# Patient Record
Sex: MALE | Race: WHITE | NOT HISPANIC OR LATINO | Employment: FULL TIME | URBAN - METROPOLITAN AREA
[De-identification: names, ages, dates, MRNs, and addresses within clinical notes are randomized per-mention and may not be internally consistent; named-entity substitution may affect disease eponyms.]

---

## 2018-02-07 ENCOUNTER — OFFICE VISIT CONVERTED (OUTPATIENT)
Dept: INTERNAL MEDICINE | Facility: CLINIC | Age: 28
End: 2018-02-07
Attending: INTERNAL MEDICINE

## 2018-06-04 ENCOUNTER — OFFICE VISIT CONVERTED (OUTPATIENT)
Dept: INTERNAL MEDICINE | Facility: CLINIC | Age: 28
End: 2018-06-04
Attending: PHYSICIAN ASSISTANT

## 2018-06-04 ENCOUNTER — CONVERSION ENCOUNTER (OUTPATIENT)
Dept: INTERNAL MEDICINE | Facility: CLINIC | Age: 28
End: 2018-06-04

## 2019-02-07 ENCOUNTER — HOSPITAL ENCOUNTER (OUTPATIENT)
Dept: OTHER | Facility: HOSPITAL | Age: 29
Discharge: HOME | End: 2019-02-07
Attending: INTERNAL MEDICINE

## 2019-02-07 ENCOUNTER — OFFICE VISIT CONVERTED (OUTPATIENT)
Dept: INTERNAL MEDICINE | Facility: CLINIC | Age: 29
End: 2019-02-07
Attending: INTERNAL MEDICINE

## 2019-02-07 LAB
ALBUMIN SERPL-MCNC: 4.3 G/DL (ref 3.5–5)
ALBUMIN/GLOB SERPL: 1.2 {RATIO} (ref 1.4–2.6)
ALP SERPL-CCNC: 77 U/L (ref 53–128)
ALT SERPL-CCNC: 25 U/L (ref 10–40)
ANION GAP SERPL CALC-SCNC: 18 MMOL/L (ref 8–19)
AST SERPL-CCNC: 25 U/L (ref 15–50)
BASOPHILS # BLD AUTO: 0.03 10*3/UL (ref 0–0.2)
BASOPHILS NFR BLD AUTO: 0.42 % (ref 0–3)
BILIRUB SERPL-MCNC: 0.31 MG/DL (ref 0.2–1.3)
BUN SERPL-MCNC: 24 MG/DL (ref 5–25)
BUN/CREAT SERPL: 23 {RATIO} (ref 6–20)
CALCIUM SERPL-MCNC: 9.5 MG/DL (ref 8.7–10.4)
CHLORIDE SERPL-SCNC: 102 MMOL/L (ref 99–111)
CHOLEST SERPL-MCNC: 116 MG/DL (ref 107–200)
CHOLEST/HDLC SERPL: 3 {RATIO} (ref 3–6)
CONV CO2: 23 MMOL/L (ref 22–32)
CONV TOTAL PROTEIN: 7.8 G/DL (ref 6.3–8.2)
CREAT UR-MCNC: 1.04 MG/DL (ref 0.7–1.2)
EOSINOPHIL # BLD AUTO: 0.2 10*3/UL (ref 0–0.7)
EOSINOPHIL # BLD AUTO: 3.27 % (ref 0–7)
ERYTHROCYTE [DISTWIDTH] IN BLOOD BY AUTOMATED COUNT: 11 % (ref 11.5–14.5)
GFR SERPLBLD BASED ON 1.73 SQ M-ARVRAT: >60 ML/MIN/{1.73_M2}
GLOBULIN UR ELPH-MCNC: 3.5 G/DL (ref 2–3.5)
GLUCOSE SERPL-MCNC: 80 MG/DL (ref 70–99)
HBA1C MFR BLD: 15.4 G/DL (ref 14–18)
HCT VFR BLD AUTO: 42.7 % (ref 42–52)
HDLC SERPL-MCNC: 39 MG/DL (ref 40–60)
LDLC SERPL CALC-MCNC: 67 MG/DL (ref 70–100)
LYMPHOCYTES # BLD AUTO: 1.83 10*3/UL (ref 1–5)
MCH RBC QN AUTO: 33.7 PG (ref 27–31)
MCHC RBC AUTO-ENTMCNC: 36 G/DL (ref 33–37)
MCV RBC AUTO: 93.6 FL (ref 80–96)
MONOCYTES # BLD AUTO: 0.47 10*3/UL (ref 0.2–1.2)
MONOCYTES NFR BLD AUTO: 7.63 % (ref 3–10)
NEUTROPHILS # BLD AUTO: 3.63 10*3/UL (ref 2–8)
NEUTROPHILS NFR BLD AUTO: 58.9 % (ref 30–85)
NRBC BLD AUTO-RTO: 0 % (ref 0–0.01)
OSMOLALITY SERPL CALC.SUM OF ELEC: 291 MOSM/KG (ref 273–304)
PLATELET # BLD AUTO: 298 10*3/UL (ref 130–400)
PMV BLD AUTO: 7.5 FL (ref 7.4–10.4)
POTASSIUM SERPL-SCNC: 4.4 MMOL/L (ref 3.5–5.3)
RBC # BLD AUTO: 4.57 10*6/UL (ref 4.7–6.1)
SODIUM SERPL-SCNC: 139 MMOL/L (ref 135–147)
TRIGL SERPL-MCNC: 50 MG/DL (ref 40–150)
TSH SERPL-ACNC: 1.58 M[IU]/L (ref 0.27–4.2)
VARIANT LYMPHS NFR BLD MANUAL: 29.8 % (ref 20–45)
VLDLC SERPL-MCNC: 10 MG/DL (ref 5–37)
WBC # BLD AUTO: 6.16 10*3/UL (ref 4.8–10.8)

## 2019-04-29 ENCOUNTER — HOSPITAL ENCOUNTER (OUTPATIENT)
Dept: OTHER | Facility: HOSPITAL | Age: 29
Discharge: HOME | End: 2019-04-29
Attending: INTERNAL MEDICINE

## 2019-04-29 LAB
APPEARANCE UR: CLEAR
BILIRUB UR QL: NEGATIVE
COLOR UR: YELLOW
CONV COLLECTION SOURCE (UA): NORMAL
CONV UROBILINOGEN IN URINE BY AUTOMATED TEST STRIP: 0.2 {EHRLICHU}/DL (ref 0.1–1)
GLUCOSE UR QL: NEGATIVE MG/DL
HGB UR QL STRIP: NEGATIVE
KETONES UR QL STRIP: NEGATIVE MG/DL
LEUKOCYTE ESTERASE UR QL STRIP: NEGATIVE
NITRITE UR QL STRIP: NEGATIVE
PH UR STRIP.AUTO: 5 [PH] (ref 5–8)
PROT UR QL: NEGATIVE MG/DL
SP GR UR: 1.02 (ref 1–1.03)

## 2019-05-01 LAB — BACTERIA UR CULT: NORMAL

## 2019-06-27 ENCOUNTER — CONVERSION ENCOUNTER (OUTPATIENT)
Dept: INTERNAL MEDICINE | Facility: CLINIC | Age: 29
End: 2019-06-27

## 2019-06-27 ENCOUNTER — OFFICE VISIT CONVERTED (OUTPATIENT)
Dept: INTERNAL MEDICINE | Facility: CLINIC | Age: 29
End: 2019-06-27
Attending: INTERNAL MEDICINE

## 2020-02-07 ENCOUNTER — OFFICE VISIT CONVERTED (OUTPATIENT)
Dept: INTERNAL MEDICINE | Facility: CLINIC | Age: 30
End: 2020-02-07
Attending: INTERNAL MEDICINE

## 2020-02-07 ENCOUNTER — HOSPITAL ENCOUNTER (OUTPATIENT)
Dept: OTHER | Facility: HOSPITAL | Age: 30
Discharge: HOME | End: 2020-02-07
Attending: INTERNAL MEDICINE

## 2020-02-07 LAB
ALBUMIN SERPL-MCNC: 4.5 G/DL (ref 3.5–5)
ALBUMIN/GLOB SERPL: 1.4 {RATIO} (ref 1.4–2.6)
ALP SERPL-CCNC: 73 U/L (ref 53–128)
ALT SERPL-CCNC: 15 U/L (ref 10–40)
ANION GAP SERPL CALC-SCNC: 21 MMOL/L (ref 8–19)
AST SERPL-CCNC: 21 U/L (ref 15–50)
BASOPHILS # BLD AUTO: 0.04 10*3/UL (ref 0–0.2)
BASOPHILS NFR BLD AUTO: 0.6 % (ref 0–3)
BILIRUB SERPL-MCNC: 0.5 MG/DL (ref 0.2–1.3)
BUN SERPL-MCNC: 17 MG/DL (ref 5–25)
BUN/CREAT SERPL: 17 {RATIO} (ref 6–20)
CALCIUM SERPL-MCNC: 9.5 MG/DL (ref 8.7–10.4)
CHLORIDE SERPL-SCNC: 99 MMOL/L (ref 99–111)
CHOLEST SERPL-MCNC: 128 MG/DL (ref 107–200)
CHOLEST/HDLC SERPL: 3.3 {RATIO} (ref 3–6)
CONV ABS IMM GRAN: 0.01 10*3/UL (ref 0–0.2)
CONV CO2: 24 MMOL/L (ref 22–32)
CONV IMMATURE GRAN: 0.2 % (ref 0–1.8)
CONV TOTAL PROTEIN: 7.8 G/DL (ref 6.3–8.2)
CREAT UR-MCNC: 0.99 MG/DL (ref 0.7–1.2)
DEPRECATED RDW RBC AUTO: 40.2 FL (ref 35.1–43.9)
EOSINOPHIL # BLD AUTO: 0.22 10*3/UL (ref 0–0.7)
EOSINOPHIL # BLD AUTO: 3.5 % (ref 0–7)
ERYTHROCYTE [DISTWIDTH] IN BLOOD BY AUTOMATED COUNT: 11.7 % (ref 11.6–14.4)
GFR SERPLBLD BASED ON 1.73 SQ M-ARVRAT: >60 ML/MIN/{1.73_M2}
GLOBULIN UR ELPH-MCNC: 3.3 G/DL (ref 2–3.5)
GLUCOSE SERPL-MCNC: 80 MG/DL (ref 70–99)
HCT VFR BLD AUTO: 43.6 % (ref 42–52)
HDLC SERPL-MCNC: 39 MG/DL (ref 40–60)
HGB BLD-MCNC: 15.1 G/DL (ref 14–18)
LDLC SERPL CALC-MCNC: 79 MG/DL (ref 70–100)
LYMPHOCYTES # BLD AUTO: 2.02 10*3/UL (ref 1–5)
LYMPHOCYTES NFR BLD AUTO: 32.5 % (ref 20–45)
MCH RBC QN AUTO: 32.9 PG (ref 27–31)
MCHC RBC AUTO-ENTMCNC: 34.6 G/DL (ref 33–37)
MCV RBC AUTO: 95 FL (ref 80–96)
MONOCYTES # BLD AUTO: 0.46 10*3/UL (ref 0.2–1.2)
MONOCYTES NFR BLD AUTO: 7.4 % (ref 3–10)
NEUTROPHILS # BLD AUTO: 3.46 10*3/UL (ref 2–8)
NEUTROPHILS NFR BLD AUTO: 55.8 % (ref 30–85)
NRBC CBCN: 0 % (ref 0–0.7)
OSMOLALITY SERPL CALC.SUM OF ELEC: 289 MOSM/KG (ref 273–304)
PLATELET # BLD AUTO: 283 10*3/UL (ref 130–400)
PMV BLD AUTO: 10.2 FL (ref 9.4–12.4)
POTASSIUM SERPL-SCNC: 4.5 MMOL/L (ref 3.5–5.3)
RBC # BLD AUTO: 4.59 10*6/UL (ref 4.7–6.1)
SODIUM SERPL-SCNC: 139 MMOL/L (ref 135–147)
TRIGL SERPL-MCNC: 48 MG/DL (ref 40–150)
VLDLC SERPL-MCNC: 10 MG/DL (ref 5–37)
WBC # BLD AUTO: 6.21 10*3/UL (ref 4.8–10.8)

## 2020-05-08 ENCOUNTER — TELEMEDICINE CONVERTED (OUTPATIENT)
Dept: INTERNAL MEDICINE | Facility: CLINIC | Age: 30
End: 2020-05-08
Attending: INTERNAL MEDICINE

## 2020-05-21 ENCOUNTER — OFFICE VISIT CONVERTED (OUTPATIENT)
Dept: UROLOGY | Facility: CLINIC | Age: 30
End: 2020-05-21
Attending: UROLOGY

## 2020-06-26 ENCOUNTER — PROCEDURE VISIT CONVERTED (OUTPATIENT)
Dept: UROLOGY | Facility: CLINIC | Age: 30
End: 2020-06-26
Attending: UROLOGY

## 2020-07-02 ENCOUNTER — OFFICE VISIT CONVERTED (OUTPATIENT)
Dept: UROLOGY | Facility: CLINIC | Age: 30
End: 2020-07-02
Attending: UROLOGY

## 2020-09-03 ENCOUNTER — OFFICE VISIT CONVERTED (OUTPATIENT)
Dept: UROLOGY | Facility: CLINIC | Age: 30
End: 2020-09-03
Attending: UROLOGY

## 2020-10-02 ENCOUNTER — OFFICE VISIT CONVERTED (OUTPATIENT)
Dept: INTERNAL MEDICINE | Facility: CLINIC | Age: 30
End: 2020-10-02
Attending: NURSE PRACTITIONER

## 2020-10-02 ENCOUNTER — HOSPITAL ENCOUNTER (OUTPATIENT)
Dept: OTHER | Facility: HOSPITAL | Age: 30
Discharge: HOME | End: 2020-10-02
Attending: NURSE PRACTITIONER

## 2020-10-02 LAB
ALBUMIN SERPL-MCNC: 4.5 G/DL (ref 3.5–5)
ALBUMIN/GLOB SERPL: 1.5 {RATIO} (ref 1.4–2.6)
ALP SERPL-CCNC: 75 U/L (ref 53–128)
ALT SERPL-CCNC: 43 U/L (ref 10–40)
ANION GAP SERPL CALC-SCNC: 15 MMOL/L (ref 8–19)
AST SERPL-CCNC: 38 U/L (ref 15–50)
BASOPHILS # BLD AUTO: 0.03 10*3/UL (ref 0–0.2)
BASOPHILS NFR BLD AUTO: 0.5 % (ref 0–3)
BILIRUB SERPL-MCNC: 0.28 MG/DL (ref 0.2–1.3)
BUN SERPL-MCNC: 18 MG/DL (ref 5–25)
BUN/CREAT SERPL: 19 {RATIO} (ref 6–20)
CALCIUM SERPL-MCNC: 9.1 MG/DL (ref 8.7–10.4)
CHLORIDE SERPL-SCNC: 102 MMOL/L (ref 99–111)
CONV ABS IMM GRAN: 0.01 10*3/UL (ref 0–0.2)
CONV CO2: 25 MMOL/L (ref 22–32)
CONV IMMATURE GRAN: 0.2 % (ref 0–1.8)
CONV TOTAL PROTEIN: 7.5 G/DL (ref 6.3–8.2)
CREAT UR-MCNC: 0.96 MG/DL (ref 0.7–1.2)
DEPRECATED RDW RBC AUTO: 39.4 FL (ref 35.1–43.9)
EOSINOPHIL # BLD AUTO: 0.14 10*3/UL (ref 0–0.7)
EOSINOPHIL # BLD AUTO: 2.3 % (ref 0–7)
ERYTHROCYTE [DISTWIDTH] IN BLOOD BY AUTOMATED COUNT: 11 % (ref 11.6–14.4)
GFR SERPLBLD BASED ON 1.73 SQ M-ARVRAT: >60 ML/MIN/{1.73_M2}
GLOBULIN UR ELPH-MCNC: 3 G/DL (ref 2–3.5)
GLUCOSE SERPL-MCNC: 80 MG/DL (ref 70–99)
HCT VFR BLD AUTO: 42.9 % (ref 42–52)
HGB BLD-MCNC: 14.4 G/DL (ref 14–18)
LYMPHOCYTES # BLD AUTO: 2.01 10*3/UL (ref 1–5)
LYMPHOCYTES NFR BLD AUTO: 33.7 % (ref 20–45)
MCH RBC QN AUTO: 32.4 PG (ref 27–31)
MCHC RBC AUTO-ENTMCNC: 33.6 G/DL (ref 33–37)
MCV RBC AUTO: 96.6 FL (ref 80–96)
MONOCYTES # BLD AUTO: 0.52 10*3/UL (ref 0.2–1.2)
MONOCYTES NFR BLD AUTO: 8.7 % (ref 3–10)
NEUTROPHILS # BLD AUTO: 3.25 10*3/UL (ref 2–8)
NEUTROPHILS NFR BLD AUTO: 54.6 % (ref 30–85)
NRBC CBCN: 0 % (ref 0–0.7)
OSMOLALITY SERPL CALC.SUM OF ELEC: 287 MOSM/KG (ref 273–304)
PLATELET # BLD AUTO: 300 10*3/UL (ref 130–400)
PMV BLD AUTO: 10.2 FL (ref 9.4–12.4)
POTASSIUM SERPL-SCNC: 4.4 MMOL/L (ref 3.5–5.3)
RBC # BLD AUTO: 4.44 10*6/UL (ref 4.7–6.1)
SODIUM SERPL-SCNC: 138 MMOL/L (ref 135–147)
T4 FREE SERPL-MCNC: 1.1 NG/DL (ref 0.9–1.8)
TSH SERPL-ACNC: 1.17 M[IU]/L (ref 0.27–4.2)
WBC # BLD AUTO: 5.96 10*3/UL (ref 4.8–10.8)

## 2020-10-05 LAB
FERRITIN SERPL-MCNC: 128 NG/ML (ref 30–300)
IRON SATN MFR SERPL: 23 % (ref 20–55)
IRON SERPL-MCNC: 80 UG/DL (ref 70–180)
TIBC SERPL-MCNC: 347 UG/DL (ref 245–450)
TRANSFERRIN SERPL-MCNC: 243 MG/DL (ref 215–365)

## 2020-10-08 ENCOUNTER — HOSPITAL ENCOUNTER (OUTPATIENT)
Dept: CARDIOLOGY | Facility: HOSPITAL | Age: 30
Discharge: HOME | End: 2020-10-08
Attending: NURSE PRACTITIONER

## 2020-10-21 ENCOUNTER — OFFICE VISIT CONVERTED (OUTPATIENT)
Dept: CARDIOLOGY | Facility: CLINIC | Age: 30
End: 2020-10-21
Attending: INTERNAL MEDICINE

## 2020-10-21 ENCOUNTER — CONVERSION ENCOUNTER (OUTPATIENT)
Dept: CARDIOLOGY | Facility: CLINIC | Age: 30
End: 2020-10-21

## 2020-11-03 ENCOUNTER — TELEPHONE CONVERTED (OUTPATIENT)
Dept: INTERNAL MEDICINE | Facility: CLINIC | Age: 30
End: 2020-11-03
Attending: INTERNAL MEDICINE

## 2021-02-09 ENCOUNTER — TELEMEDICINE CONVERTED (OUTPATIENT)
Dept: INTERNAL MEDICINE | Facility: CLINIC | Age: 31
End: 2021-02-09
Attending: INTERNAL MEDICINE

## 2021-05-10 NOTE — H&P
History and Physical      Patient Name: Hamlet Kong   Patient ID: [id number]   Sex: Male   YOB: 1990    Primary Care Provider: Mily Braun MD   Referring Provider: Mily Braun MD    Visit Date: May 21, 2020    Provider: Demetra Robertson MD   Location: [facility]   Location Address: [address] [place] [address]  [id number]   Location Phone: [phone]          Chief Complaint  · pt here for urologic concerns  · The patient is here for a vasectomy consultation.      History Of Present Illness  Hamlet Kong is here for counseling regarding vasectomy for permanent sterilization.   The procedure was discussed with the patient. Hamlet Kong is aware that the procedure should be considered irreversible, although at times it can be reversed with success. Risks for the procedure including local effects of selling, bleeding, pain, the possibility for recanalization, and continued fertility is also possible. Formation of a sperm granuloma also is a possibility. We discussed the procedure, preoperative preparation, and postoperative care. We also discussed the importance of continuing to use contraception post vasectomy, since the patient will not be sterile at that point. We stressed the importance of continuing to use contraception until a follow-up semen specimen is done that shows the absence of sperm. That specimen will normally be obtained eight weeks post-surgery.   Hamlet Kong is voluntarily requesting this procedure and understands that if it is successful he will be unable to father children. Has 3 children; youngest is  as of early April; doing well.   He has no cardiopulmonary history, no prior scrotal surgery, no blood thinners.       Allergy List    Allergies Reconciled  Review of Systems  · Constitutional  o Denies  o : fever, chills  · Gastrointestinal  o Denies  o : nausea, vomiting      Vitals  Date Time BP Position Site L\R Cuff Size HR RR  "TEMP (F) WT  HT  BMI kg/m2 BSA m2 O2 Sat        05/21/2020 02:19 PM       12  231lbs 0oz 5'  8\" 35.12 2.24           Physical Examination  · Constitutional  o Appearance  o : well developed, well-nourished, in no acute distress.   · Neck  o Inspection/Palpation  o : normal appearance, no masses or tenderness, trachea midline  · Respiratory  o Respiratory Effort  o : breathing unlabored  o Auscultation of Lungs  o : clear to ascultation bilaterally  · Cardiovascular  o Heart  o :   § Auscultation of Heart  § : regular rate and rhythm, no murmurs  · Gastrointestinal  o Abdominal Examination  o : nontender, nondistended, no rigidity or gaurding,no hepatosplenomegaly  · Genitourinary  o Bladder  o : nonpalpable  o Penis  o : normal circumcised phallus with no masses or lesions  o Urethral Meatus  o : no inflammation present and no stenosis  o Scrotum and Scrotal Contents  o :   § Scrotum  § : bilateral vas were palpable  § Testes  § : descended testicles no masses or lesions  · Neurologic  o Mental Status Examination  o :   § Orientation  § : grossly oriented to person, place and time, judgement and insight intact, normal mood and affect          Assessment  · Consultation for sterilization     V25.09/Z30.09    Problems Reconciled  Plan  · Medications  o Medications have been Reconciled  o Transition of Care or Provider Policy  · Instructions  o The patient will schedule a vasectomy if he desires.  o Handouts were provided, vasectomy  scanned into the EMR for reference.   o Vasectomy is intended to be a permanent form of contraception.  o Vasectomy does not produce immediate sterility.  o Following vasectomy, another form of contraception is required until vas occlusion is confirmed by post-vasectomy semen analysis (PVSA).  o Even after vas occlusion is confirmed, vasectomy is not 100% reliable in preventing pregnancy.  o The risk of pregnancy after vasectomy is approximately 1 in 2,000 for men who have no " sperm in the semen on post-vasectomy semen analysis showing rare non-motile sperm (RNMS).  o Repeat vasectomy is necessary in < 1% of vasectomies, provided that a technique for vas occlusion known to have low occlusive failure rate has been used.   o Patient's should refrain from ejaculation for approximately 1 week after vasectomy.  o Options for fertility after vasectomy reversal and sperm retrival with in vitro fertilization. These options are not always successful, and are very expensive.   o The rates of surgical complications such as symptomatic hematoma and infection are 1-2%.  o Chronic scrotal pain associated with negative impact on quality of life occurs after vasectomy in about 1-2% of men. Few of these men require addtional surgery.   o Other permanent and non-permanent alternatives to vasectomy are avaliable.  o Patient voiced understanding of the above statements.  o Electronically Identified Patient Education Materials Provided Electronically     Aware he must have  present at time of vasectomy             Electronically Signed by: Demetra Robertson MD -Author on May 21, 2020 02:43:11 PM

## 2021-05-10 NOTE — PROCEDURES
Procedure Note      Patient Name: Hamlet Kong   Patient ID: [id number]   Sex: Male   YOB: 1990    Primary Care Provider: Mily Braun MD   Referring Provider: Mily Braun MD    Visit Date: June 26, 2020    Provider: Demetra Roebrtson MD   Location: [facility]   Location Address: [address] [place] [address]  [id number]   Location Phone: [phone]          Vasectomy Procedure  Procedure: Bilateral Vasectomy   Pre-procedure Diagnosis: Undesired Fertility   Post-procedure Diagnosis: Same   Surgeon: Demetra Amaya MD   Anesthesia: Local, 10 cc of 1% Lidocaine   Indications Hamlet Kong with undesired fertility, who presents today for bilateral vasectomy for permanent contraception.   Procedure Details:   The patient was appropriately identified, brought into the procedure room, and placed supine on the procedure table. A time was undertaken documenting the correct patient, site and procedure. His scrotum was prepped and draped in the standard sterile fashion. We first approached the right vas deferens. This was identified,  from the spermatic cord structures, and brought to the anterolateral aspect of the hemiscrotum. The local anesthetic solution was injected and once an adequate level of local anesthesia was achieved, a scalpel was used to make a 1 cm incision in the skin overlying the vas deferens. A sharp hemostat was used to dissect the level of the vas deferens and on both of its sides, allowing for ring forceps to be introduced and grasp the vas deferens and externalize it through the skin incision. We then used a combination of sharp and blunt dissection to release the exposed vasal segment from all surrounding tissues. An approximately 1 cm piece of vas deferens was then sharply excised and removed. One blade of a sharp hemostat was used to probe each end of the severed vas deferens, confirming the presence of a vasal lumen. Electocautery was then  used to fulgurate both cut surfaces of the severed vas deferens. The abdominal side of the vas deferens was then placed underneath some perivasal tissues that were closed above it, using a figure-of-eight 3-0 chromic stitch, thus placing the two edges of the severed vas deferens in different tissue planes. Hemostasis was confirmed and the severed vas deferens was allowed to drop back into the scrotum. The skin was closed with a single 3-0 Chromic stitch. We then turned our attention to the left vas deferens. This was also identified and brought to the anterolateral aspect of the ipsilateral hemiscrotum. The local anesthetic was then delivered on this side, and again, the scalpel was used to make an approximately 1 cm inicision in the skin overlying the vas deferens. An identical procedure was then carried out on the vas deferens. Both wounds were dressed with bacitracin ointment, folded 4x4 gauze, and bulky fluffs. The patient tolerated the procedure well and there were no intraoperative or immediate postoperative complications.           Assessment  · Sterilization     V25.2    Problems Reconciled  Plan  · Orders  o Vasectomy (86956) - - 06/26/2020  · Medications  o Medications have been Reconciled  o Transition of Care or Provider Policy  · Instructions  o Wound Care discussed.  o Patient reminded to continue another method for contraception, until he has had a post-vasectomy semen analysis that is negative for sperm.. Patient voiced understanding.   o First post-void analysis in 10 weeks.  o Electronically Identified Patient Education Materials Provided Electronically            Electronically Signed by: Demetra Robertson MD -Author on June 26, 2020 10:55:07 AM

## 2021-05-10 NOTE — H&P
History and Physical      Patient Name: Hamlet Kong   Patient ID: [id number]   Sex: Male   YOB: 1990    Primary Care Provider: Brittany GAYTAN   Referring Provider: Brittany GAYTAN    Visit Date: October 21, 2020    Provider: Milo Fried MD   Location: [facility]   Location Address: [address] [place] [address], [address]   R[place]HA Figueroa  [id number]   Location Phone: [phone]          History Of Present Illness  Consult requested by: Brittany GAYTAN   Hamlet Kong is a 30 year old /White male who has no previous cardiac history. He has a chief complaint of palpitations. The patient reports very brief episodes of fluttering. These have occurred more often since he has begun working-out over the past few weeks. They are described distinctly as feeling as if his heart skips one beat, and then, he has one hard heartbeat, and then, his symptoms go away. It does not occur with physical exertion. It is noticeable more at rest, sometimes at night when he is going to sleep. He does manual labor and stays very active and does not have symptoms during work.   PAST MEDICAL & SURGICAL HISTORY: Anxiety; Obesity; Pilonidal cyst removal.   PSYCHOSOCIAL HISTORY: Denies tobacco use. Moderate alcohol intake. Moderate caffeine intake. He is . He works as a .   FAMILY HISTORY: Negative for premature CAD. Positive for diabetes mellitus and hypertension.   CURRENT MEDICATIONS: Buspar 5 mg 2-3x daily.   ALLERGIES: No known drug allergies.       Review of Systems  · Constitutional  o Admits  o : good general health lately  o Denies  o : fatigue, recent weight changes   · Eyes  o Denies  o : double vision  · HENT  o Denies  o : hearing loss or ringing, chronic sinus problem, swollen glands in neck  · Cardiovascular  o Admits  o : palpitations (fast, fluttering, or skipping beats)  o Denies  o : chest pain, swelling (feet, ankles, hands), shortness of breath while walking or  "lying flat  · Respiratory  o Denies  o : chronic or frequent cough, asthma or wheezing, COPD  · Gastrointestinal  o Denies  o : ulcers, nausea or vomiting  · Neurologic  o Denies  o : lightheaded or dizzy, stroke, headaches  · Musculoskeletal  o Denies  o : joint pain, back pain  · Endocrine  o Denies  o : thyroid disease, diabetes, heat or cold intolerance, excessive thirst or urination  · Heme-Lymph  o Denies  o : bleeding or bruising tendency, anemia      Vitals  Date Time BP Position Site L\R Cuff Size HR RR TEMP (F) WT  HT  BMI kg/m2 BSA m2 O2 Sat FR L/min FiO2 HC       10/21/2020 09:07 /64 Sitting    84 - R   228lbs 0oz 5'  7\" 35.71 2.21             Physical Examination  · Constitutional  o Appearance  o : Obese, White male, pleasant, in no acute distress.  · Head and Face  o HEENT  o : No pallor, anicteric. Eyes normal. Moist mucous membranes.  · Neck  o Inspection/Palpation  o : Supple.   o Jugular Veins  o : No JVD. No carotid bruits.  · Respiratory  o Auscultation of Lungs  o : Clear to auscultation bilaterally. No crackles or wheezing.  · Cardiovascular  o Heart  o : S1, S2 normally heard. No S3. No murmur, rubs, or gallops.  · Gastrointestinal  o Abdominal Examination  o : Soft, non-distended. No palpable hepatosplenomegaly. Bowel sounds heard in all four quadrants.  · Musculoskeletal  o General  o : Normal muscle tone and strength.  · Skin and Subcutaneous Tissue  o General Inspection  o : No skin rashes.  · Extremities  o Extremities  o : Warm and well perfused. Distal pulses present. No pitting pedal edema.  · EKG  o EKG  o : His baseline EKG showed sinus rhythm and normal axis intervals.   · Labs  o Labs  o : Normal CBC. Normal thyroid panel. Normal CMP.  · Diagnostic Testing  o Diagnostic Testing  o : He had a Holter monitor recently, which showed sinus rhythm; heart rate 45 to 180, average 85 beats per minute. There were occasional PVCs and PACs. No sustained arrhythmia. The patient did do " his normal exercise routine during the Holter monitor, which is when his heart rate was sinus tachycardia at a rate of 180.   · Data  o Data  o : I reviewed his Primary Care office note.           Assessment     Palpitations.  By description, these are most consistent with single ectopic beats, likely PVCs.  Holter monitor shows no sustained arrhythmia.  Symptoms occur at rest and do not become frequent with exercise.  He did perform physical exercise during his Holter monitor, which correlated to study sinus rhythm.  Basic labs and baseline EKG are normal.       Plan     1.  I discussed with Mr. Kong today the ectopic beats are low-risk.  There is no pattern of high-risk        arrhythmia at this time.  I do not recommend, given the relatively infrequent nature of his ectopy, that he        be put on medication for this.  I have encouraged him to stay active and continue his exercise routine.  2.  At this time, no additional cardiac workup is needed.  The patient will be seen back as needed in the future        if his symptoms become more frequent or severe.  He is agreeable with this plan.      It is a pleasure to assist in his care.  Please let me know if you have any questions regarding his case.      NAHEED Fried MD  CBD:vm             Electronically Signed by: Margi Noriega-, Other -Author on October 22, 2020 02:21:58 PM  Electronically Co-signed by: Milo Fried MD -Reviewer on October 23, 2020 08:16:11 AM

## 2021-05-13 NOTE — PROGRESS NOTES
"   Progress Note      Patient Name: Hamlet Kong   Patient ID: [id number]   Sex: Male   YOB: 1990    Primary Care Provider: Mily Braun MD   Referring Provider: Mily Braun MD    Visit Date: July 2, 2020    Provider: Demetra Robertson MD   Location: [facility]   Location Address: [address] [place] [address]  [id number]   Location Phone: [phone]          Chief Complaint  · \"Follow-up after vasectomy\"      History Of Present Illness  The patient is a 30 year old /White male who returns for a unscheduled post-operative follow-up visit after undergoing an elective bilateral vasectomy on 06/26/2020. The patient has been having scrotal swelling with bruising and pain to the right side of the scrotum.   He states since the procedure, he has returned to work.       Past Medical History  Anxiety         Past Surgical History  Cyst Removal; Vasectomy         Medication List  cetirizine 10 mg oral tablet; fluticasone propionate 50 mcg/actuation nasal spray,suspension; Lexapro 5 mg oral tablet         Allergy List  NO KNOWN DRUG ALLERGIES       Allergies Reconciled  Family Medical History  Stroke; Diabetes         Social History  Alcohol (Current some day); Tobacco (Never)         Immunizations  Name Date Admin   Influenza    Influenza    Influenza    Tdap          Review of Systems  · Constitutional  o Denies  o : chills, fever  · Gastrointestinal  o Denies  o : nausea, vomiting, diarrhea  · Genitourinary  o Admits  o : scrotal pain  o Denies  o : urgency, frequency, dysuria, nocturia      Physical Examination  · Genitourinary  o Penis  o : Normal appearance without lesion, discharge or masses.  o Scrotum and Scrotal Contents  o :   § Scrotum  § : scrotum with dark purple ecchymosis and firm golf ball sized area that is TTP to right proximal scrotum.  · Lymphatic  o Groin  o : No lymphadenopathy present, Right inguinal area with firm dark purple ecchymosis and TTP "               Assessment  · Hematoma (nontraumatic) of male genital organs     608.83/N50.9    Problems Reconciled  Plan  · Medications  o Medications have been Reconciled  o Transition of Care or Provider Policy  · Instructions  o Discussion: Provided reassurance. Discussed that this is a non common occurrence after surgery, although appears to have peaked and is resolving. Educated to continue to wear scrotal support and elevate scrotum when possible, as well as continue to use NSAIDs for pain relief. Educated that this should resolve in 4-6 weeks with conservative treatment.   o Follow-up for an office post vas semen analysis as previously scheduled or earlier as needed  o Electronically Identified Patient Education Materials Provided Electronically            Electronically Signed by: Demetra Robertson MD -Author on July 2, 2020 07:15:16 PM

## 2021-05-13 NOTE — PROGRESS NOTES
"   Progress Note      Patient Name: Hamlet Kong   Patient ID: [id number]   Sex: Male   YOB: 1990    Primary Care Provider: Mily Braun MD   Referring Provider: Mily Braun MD    Visit Date: October 2, 2020    Provider: LUCILA Boudreaux   Location: [facility]   Location Address: [address] [place] [address]  [id number]   Location Phone: [phone]          Chief Complaint  · Palpitations since last Tuesday      History Of Present Illness  Hamlet Kong is a 30 year old /White male who presents for evaluation and treatment of:      Patient reports daily palpitations x 10 days. Describes as \"heart flutters\". Last only a few seconds, occur every few minutes to hours in between. Typically occur at rest. Denies chest pain, blurry vision, shortness of breath, lightheadedness. No other symptoms when these occur. He has recently started exercising more, however these do not seem to occur when he is working out. Patient admits significant anxiety, he is wanting to stop the Lexapro and try a different medication. He feels like his anxiety is fairly well controlled but he doesn't feel like this medication is the one for him. He was previously on Zoloft, which was not helpful. Thyroid last checked 2/2019. Patients wife is a nurse, states that while listening to his heart he had an episode and it sounded like his heart skipped a beat.       Past Medical History  Disease Name Date Onset Notes   Anxiety --  --          Past Surgical History  Procedure Name Date Notes   Cyst Removal 2006 --    Vasectomy --  --          Medication List  Name Date Started Instructions   escitalopram oxalate 5 mg oral tablet 09/02/2020 TAKE 1 TABLET(5 MG) BY MOUTH EVERY DAY         Allergy List  Allergen Name Date Reaction Notes   NO KNOWN DRUG ALLERGIES --  --  --        Allergies Reconciled  Family Medical History  Disease Name Relative/Age Notes   Stroke  grandparents " "  Diabetes Father/   --          Social History  Finding Status Start/Stop Quantity Notes   Alcohol Current some day --/-- --  6 12 ounce beers   Tobacco Never --/-- --  --          Immunizations  NameDate Admin Mfg Trade Name Lot Number Route Inj VIS Given VIS Publication   Pgfsinpml75/01/2019 SKJCARLOS Fluarix, quadrivalent, preservative free [id number] NE NE 02/07/2020    Comments:    Tdap11/18/2016 SKB Td [id number] IM LT 11/18/2016 02/24/2015   Comments: Patient tolerated well, left office in stbale condition.         Review of Systems  · Constitutional  o Denies  o : fever, fatigue, weight loss, weight gain  · HENT  o Denies  o : headaches, vertigo, lightheadedness  · Cardiovascular  o Admits  o : palpitations  o Denies  o : lower extremity edema, chest pressure  · Respiratory  o Denies  o : shortness of breath, wheezing, cough, dyspnea on exertion  · Gastrointestinal  o Denies  o : nausea, vomiting, diarrhea, constipation, abdominal pain  · Psychiatric  o Admits  o : anxiety  o Denies  o : depression, suicidal ideation, homicidal ideation      Vitals  Date Time BP Position Site L\R Cuff Size HR RR TEMP (F) WT  HT  BMI kg/m2 BSA m2 O2 Sat FR L/min FiO2 HC       06/27/2019 11:31 /80 Sitting    68 - R  97.1 216lbs 0oz 5'  7\" 33.83 2.15 99 %  21%    02/07/2020 04:23 /84 Sitting    70 - R  97.2 221lbs 4oz 5'  7\" 34.65 2.18 97 %  21%    10/02/2020 03:55 /78 Sitting    76 - R  97.6 231lbs 4oz 5'  8\" 35.16 2.24 98 %  21%          Physical Examination  · Constitutional  o Appearance  o : no acute distress, well-nourished  · Head and Face  o Head  o :   § Inspection  § : atraumatic, normocephalic  · Eyes  o Eyes  o : extraocular movements intact, no scleral icterus, no conjunctival injection  · Ears, Nose, Mouth and Throat  o Ears  o :   § External Ears  § : normal  o Nose  o :   § Intranasal Exam  § : nares patent  o Oral Cavity  o :   § Oral Mucosa  § : moist mucous membranes  · Neck  o Thyroid  o : gland size " normal, nontender, no nodules or masses present on palpation, symmetric  · Respiratory  o Respiratory Effort  o : breathing comfortably, symmetric chest rise  o Auscultation of Lungs  o : clear to asculatation bilaterally, no wheezes, rales, or rhonchii  · Cardiovascular  o Heart  o :   § Auscultation of Heart  § : regular rate and rhythm, no murmurs, rubs, or gallops  o Peripheral Vascular System  o :   § Extremities  § : no edema  · Skin and Subcutaneous Tissue  o General Inspection  o : no lesions present, no areas of discoloration, skin turgor normal  · Neurologic  o Mental Status Examination  o :   § Orientation  § : grossly oriented to person, place and time  o Gait and Station  o :   § Gait Screening  § : normal gait  · Psychiatric  o General  o : normal mood and affect              Assessment  · Anxiety     300.00/F41.9  Will trial wean off of escitalopram. Discussed starting with every other day and decreasing from there. He will start Buspar once wean is complete, unless he feels that his anxiety is severe in which case he may start Buspar as needed. He and his wife will monitor closely and seek medical attention immediately if they feel that his mental health is deteriorating. Denies SI/HI. Follow up in one month to assess medication effectiveness, sooner if concerns arise.  · Palpitations     785.1/R00.2  EKG in clinic normal. Potentially secondary to anxiety, however will schedule for holter monitor to rule out underlying etiology. Labs in clinic today, including thyroid. Will determine if further intervention is necessary based on results. He will seek medical attention immediately with chest pain, shortness of breath, diaphoresis.       Plan  · Orders  o CBC with Auto Diff [facility] ([id number]) - 785.1/R00.2, 300.00/F41.9 - 10/02/2020  o CMP [facility] ([id number]) - 785.1/R00.2, 300.00/F41.9 - 10/02/2020  o Thyroid Profile (THYII, 28136, 42870) - 785.1/R00.2, 300.00/F41.9 - 10/02/2020  o ACO-39: Current medications  updated and reviewed (1159F, ) - - 10/02/2020  o Holter Monitor 24 Hour [facility] ([id number]) - 785.1/R00.2 - 10/02/2020  · Medications  o buspirone 5 mg oral tablet   SIG: take 1 tablet by oral route 3 times a day as needed   DISP: (90) Tablet with 0 refills  Prescribed on 10/02/2020     o escitalopram oxalate 5 mg oral tablet   SIG: TAKE 1 TABLET(5 MG) BY MOUTH EVERY DAY   DISP: (90) Tablet with -1 refills  Discontinued on 10/02/2020     o Medications have been Reconciled  o Transition of Care or Provider Policy  · Instructions  o Take all medications as prescribed/directed.  o Patient was educated/instructed on their diagnosis, treatment and medications prior to discharge from the clinic today.  o Patient instructed to seek medical attention urgently for new or worsening symptoms.  o Call the office with any concerns or questions.  o Chronic conditions reviewed and taken into consideration for today's treatment plan.  · Disposition  o Call or Return if symptoms worsen or persist.  o Follow up in 1 month  o Follow up with Dr. Braun  o Prescriptions sent to pharmacy  o Labs drawn in clinic  o Will call patient with results of labs            Electronically Signed by: LUCILA Boudreaux -Author on October 2, 2020 04:36:07 PM

## 2021-05-13 NOTE — PROGRESS NOTES
"   Progress Note      Patient Name: Hamlet Kong   Patient ID: [id number]   Sex: Male   YOB: 1990    Primary Care Provider: Brittany GAYTAN   Referring Provider: Brittany GAYTAN    Visit Date: November 3, 2020    Provider: Mily Braun MD   Location: [facility]   Location Address: [address] [place] [address]  [place], [place]  [id number]   Location Phone: [phone]          Chief Complaint  · \"I was having heart palpitations and I think it's a follow up on that\"      History Of Present Illness  TELEHEALTH TELEPHONE VISIT  Hamlet Kong is a 30 year old /White male who is presenting for evaluation via telehealth telephone visit. Verbal consent obtained before beginning visit.   Provider spent 8 minutes with the patient during the telehealth visit.   The following staff were present during this visit: none   Past Medical History/ Overview of Patient Symptoms  Hamlet Kong is a 30 year old /White male who presents for evaluation and treatment of:      chronic issues.    reviewed note from Dr. Fried showing pvc's and palpitations  he wasnt' worried about the short run of SVT  Hamlet is doing well  hasn't felt bad with the palpitations  doesn't feel like he is going to pass out at all    stress is better now that his heart has been checked by cardiology    no chest pain  no trouble breathing    reviewed labs with him           Past Medical History  Disease Name Date Onset Notes   Anxiety --  --          Past Surgical History  Procedure Name Date Notes   Cyst Removal 2006 --    Vasectomy --  --          Medication List  Name Date Started Instructions   buspirone 5 mg oral tablet 10/02/2020 take 1 tablet by oral route 3 times a day as needed         Allergy List  Allergen Name Date Reaction Notes   NO KNOWN DRUG ALLERGIES --  --  --          Family Medical History  Disease Name Relative/Age Notes   Stroke  grandparents   Diabetes Father/   --  "         Social History  Finding Status Start/Stop Quantity Notes   Alcohol Current some day --/-- --  6 12 ounce beers   Tobacco Never --/-- --  --          Immunizations  NameDate Admin Mfg Trade Name Lot Number Route Inj VIS Given VIS Publication   Cmyuvtulo36/01/2019 SKB Fluarix, quadrivalent, preservative free 2A2KX NE NE 02/07/2020    Comments:    Tdap11/18/2016 SKB Td GS22H IM LT 11/18/2016 02/24/2015   Comments: Patient tolerated well, left office in stbale condition.         Review of Systems  · Constitutional  o Denies  o : fever, fatigue, weight loss, weight gain  · Cardiovascular  o Denies  o : lower extremity edema, claudication, chest pressure, palpitations  · Respiratory  o Denies  o : shortness of breath, wheezing, frequent cough, hemoptysis, dyspnea on exertion  · Gastrointestinal  o Denies  o : nausea, vomiting, diarrhea, constipation, abdominal pain      Physical Examination                Assessment  · Anxiety     300.00/F41.9  cont buspar  · Palpitations     785.1/R00.2  checked out with cardiology  doing well    Problems Reconciled  Plan  · Orders  o ACO-39: Current medications updated and reviewed (1159F, ) - - 11/03/2020  · Medications  o Medications have been Reconciled  o Transition of Care or Provider Policy  · Instructions  o Plan Of Care:   o Patient was educated/instructed on their diagnosis, treatment and medications prior to discharge from the clinic today.            Electronically Signed by: Mily Braun MD -Author on November 3, 2020 05:26:08 PM

## 2021-05-13 NOTE — PROGRESS NOTES
"   Progress Note      Patient Name: Hamlet Kong   Patient ID: [id number]   Sex: Male   YOB: 1990    Primary Care Provider: Mily Braun MD   Referring Provider: Mily Braun MD    Visit Date: May 8, 2020    Provider: Mily Braun MD   Location: [facility]   Location Address: [address] [place] [address]  [id number]   Location Phone: [phone]          Chief Complaint  · \"just following up on my anxiety medication\"      History Of Present Illness  Video Conferencing Visit  Hamlet Kong is a 30 year old /White male who is presenting for evaluation via video conferencing. Verbal consent obtained before beginning visit.   The following staff were present during this visit: Kayla Galicia started call and transferred; done over facetime   Hamlet Kong is a 30 year old /White male who presents for evaluation and treatment of:      anxiety    states that he was havin sexual dysfunction side effects with the lexapro at 10mg  now that it has been decreased to 5mg he is doing much better  it is still controlling stress fairly well, however no more side effects  no break through anxiety at this point    does have a new little baby girl at home and doin well with that       Past Medical History  Disease Name Date Onset Notes   Anxiety --  --          Past Surgical History  Procedure Name Date Notes   Cyst Removal 2006 --          Medication List  Name Date Started Instructions   cetirizine 10 mg oral tablet 06/27/2019 take 1 tablet (10 mg) by oral route once daily   fluticasone propionate 50 mcg/actuation nasal spray,suspension 06/27/2019 spray 1 - 2 sprays in each nostril by intranasal route once daily   Lexapro 5 mg oral tablet 05/04/2020 take 1 tablet (5 mg) by oral route once daily for 90 days         Allergy List  Allergen Name Date Reaction Notes   NO KNOWN DRUG ALLERGIES --  --  --          Family Medical History  Disease Name " Relative/Age Notes   Stroke  grandparents   Diabetes Father/   --          Social History  Finding Status Start/Stop Quantity Notes   Alcohol Current some day --/-- --  6 12 ounce beers   Tobacco Never --/-- --  --          Immunizations  NameDate Admin Mfg Trade Name Lot Number Route Inj VIS Given VIS Publication   Vguygldef80/01/2019 SKB Fluarix, quadrivalent, preservative free 2A2KX NE NE 02/07/2020    Comments:    Nknqemmpe72/01/2017 SKB Fluarix, quadrivalent, preservative free 2A2KX NE NE 02/07/2018 07/02/2012   Comments:    Cakzcajhs80/18/2016 SKB Fluarix, quadrivalent, preservative free MN5CS IM RT 11/18/2016 08/19/2014   Comments: Patient tolerated well, left office in stable condition.   Tdap11/18/2016 SKB Td GS22H IM LT 11/18/2016 02/24/2015   Comments: Patient tolerated well, left office in stbale condition.         Review of Systems  · Constitutional  o Denies  o : fever, fatigue, weight loss, weight gain  · Cardiovascular  o Denies  o : lower extremity edema, claudication, chest pressure, palpitations  · Respiratory  o Denies  o : shortness of breath, wheezing, frequent cough, hemoptysis, dyspnea on exertion  · Gastrointestinal  o Denies  o : nausea, vomiting, diarrhea, constipation, abdominal pain      Physical Examination     Gen: well-nourished, no acute distress  HENT: atraumatic, normocephalic  Eyes: extraocular movements intact, no scleral icterus  Lung: breathing comfortably, no cough  Skin: no visible rash, no lesions  Neuro: grossly oriented to person, place, and time. no facial droop   Psych: normal mood and affect               Assessment  · Anxiety     300.00/F41.9  doing well on lexapro  cont current meds      Plan  · Orders  o ACO-39: Current medications updated and reviewed () - - 05/08/2020  · Instructions  o Patient was educated/instructed on their diagnosis, treatment and medications prior to discharge from the clinic today.            Electronically Signed by: Mily Braun MD  -Author on May 8, 2020 10:12:53 AM

## 2021-05-13 NOTE — PROGRESS NOTES
"   Quick Note      Patient Name: Hamlet Kong   Patient ID: [id number]   Sex: Male   YOB: 1990    Primary Care Provider: Mily Braun MD   Referring Provider: Mily Braun MD    Visit Date: September 3, 2020    Provider: Demetra Robertson MD   Location: [facility]   Location Address: [address] [place] [address]  [id number]   Location Phone: [phone]          History Of Present Illness  Hamlet Kong is a 30 year old /White male who is here status post vasectomy. 0 sperm noted on a #20 power field. Hematoma improved; no external ecchymoses. Palpable firmness within right hemiscrotum consistent with resolving hematoma significantly improved from last physical exam.       Vitals  Date Time BP Position Site L\R Cuff Size HR RR TEMP (F) WT  HT  BMI kg/m2 BSA m2 O2 Sat HC       09/03/2020 12:41 PM       14  230lbs 0oz 5'  8\" 34.97 2.24               Assessment  · Postoperative Visit-status post vasectomy     V67.00  · Hematoma (nontraumatic) of male genital organs     608.83/N50.9    Problems Reconciled  Plan  · Orders  o IOP - Semen Analysis (47802, 12094) - V67.00 - 09/03/2020  · Medications  o Medications have been Reconciled  o Transition of Care or Provider Policy  · Instructions  o Instructed patient to follow-up prn.  o Electronically Identified Patient Education Materials Provided Electronically     Doing well; anticipate complete resolution of postvasectomy hematoma  Patient encouraged to follow-up as needed  All questions addressed             Electronically Signed by: Demetra Robertson MD -Author on September 3, 2020 12:53:07 PM  "

## 2021-05-14 VITALS
HEIGHT: 67 IN | BODY MASS INDEX: 35.79 KG/M2 | DIASTOLIC BLOOD PRESSURE: 64 MMHG | HEART RATE: 84 BPM | WEIGHT: 228 LBS | SYSTOLIC BLOOD PRESSURE: 120 MMHG

## 2021-05-14 VITALS
WEIGHT: 231.25 LBS | BODY MASS INDEX: 35.05 KG/M2 | OXYGEN SATURATION: 98 % | DIASTOLIC BLOOD PRESSURE: 78 MMHG | SYSTOLIC BLOOD PRESSURE: 114 MMHG | HEART RATE: 76 BPM | HEIGHT: 68 IN | TEMPERATURE: 97.6 F

## 2021-05-14 VITALS — WEIGHT: 230 LBS | BODY MASS INDEX: 34.86 KG/M2 | HEIGHT: 68 IN | RESPIRATION RATE: 14 BRPM

## 2021-05-14 NOTE — PROGRESS NOTES
"   Progress Note      Patient Name: Hamlet Kong   Patient ID: [id number]   Sex: Male   YOB: 1990    Primary Care Provider: Brittany GAYTAN   Referring Provider: Brittany GAYTAN    Visit Date: February 9, 2021    Provider: Mily Braun MD   Location: [facility]   Location Address: [address] [place] [address]  [id number]   Location Phone: [phone]          Chief Complaint  · \"following up on anxiety\"      History Of Present Illness  Video Conferencing Visit  Hamlet Kong is a 30 year old /White male who is presenting for evaluation via video conferencing via [email or web address]. Verbal consent obtained before beginning visit.   The following staff were present during this visit: none   Hamlet Kong is a 30 year old /White male who presents for evaluation and treatment of:      Worsening panic attacks.    States that he ended up in the ER with a panic attack  This is the first time this is happened to him  He felt dizzy and felt like he was having severe palpitations  Reviewed labs from ER which were normal  He feels like the buspirone is not working any longer  He did start counseling and [facility]       Past Medical History  Disease Name Date Onset Notes   Anxiety --  --          Past Surgical History  Procedure Name Date Notes   Cyst Removal 2006 --    Vasectomy --  --          Medication List  Name Date Started Instructions   buspirone 5 mg oral tablet 10/02/2020 take 1 tablet by oral route 3 times a day as needed         Allergy List  Allergen Name Date Reaction Notes   NO KNOWN DRUG ALLERGIES --  --  --          Family Medical History  Disease Name Relative/Age Notes   Stroke  grandparents   Diabetes Father/   --          Social History  Finding Status Start/Stop Quantity Notes   Alcohol Current some day --/-- --  6 12 ounce beers   Tobacco Never --/-- --  --  "         Immunizations  NameDate Admin Mfg Trade Name Lot Number Route Inj VIS Given VIS Publication   Svvrzjqvh89/01/2019 SKB Fluarix, quadrivalent, preservative free 2A2KX NE NE 02/07/2020    Comments:    Tdap11/18/2016 SKJCARLOS Td GS22H IM LT 11/18/2016 02/24/2015   Comments: Patient tolerated well, left office in stbale condition.         Physical Examination     Gen: well-nourished, no acute distress  HENT: atraumatic, normocephalic  Eyes: extraocular movements intact, no scleral icterus  Lung: breathing comfortably, no cough  Skin: no visible rash, no lesions  Neuro: grossly oriented to person, place, and time. no facial droop   Psych: normal mood and affect             Assessment  · Anxiety     300.00/F41.9  Will try Zoloft  Warned of side effects    Problems Reconciled  Plan  · Orders  o ACO-39: Current medications updated and reviewed (, 1159F) - - 02/09/2021  · Medications  o Zoloft 50 mg oral tablet   SIG: take 1 tablet (50 mg) by oral route once daily   DISP: (90) Tablet with 0 refills  Prescribed on 02/09/2021     o Medications have been Reconciled  o Transition of Care or Provider Policy  · Instructions  o Patient was educated/instructed on their diagnosis, treatment and medications prior to discharge from the clinic today.  · Disposition  o 6 Week Follow Up            Electronically Signed by: Mily Braun MD -Author on February 9, 2021 09:13:28 AM

## 2021-05-15 VITALS
HEIGHT: 67 IN | BODY MASS INDEX: 34.73 KG/M2 | TEMPERATURE: 97.2 F | WEIGHT: 221.25 LBS | SYSTOLIC BLOOD PRESSURE: 120 MMHG | OXYGEN SATURATION: 97 % | DIASTOLIC BLOOD PRESSURE: 84 MMHG | HEART RATE: 70 BPM

## 2021-05-15 VITALS — BODY MASS INDEX: 35.01 KG/M2 | RESPIRATION RATE: 12 BRPM | HEIGHT: 68 IN | WEIGHT: 231 LBS

## 2021-05-15 VITALS
TEMPERATURE: 98 F | WEIGHT: 218.12 LBS | DIASTOLIC BLOOD PRESSURE: 64 MMHG | HEART RATE: 79 BPM | OXYGEN SATURATION: 100 % | SYSTOLIC BLOOD PRESSURE: 118 MMHG | BODY MASS INDEX: 34.24 KG/M2 | HEIGHT: 67 IN | RESPIRATION RATE: 16 BRPM

## 2021-05-15 VITALS
HEIGHT: 67 IN | TEMPERATURE: 97.1 F | BODY MASS INDEX: 33.9 KG/M2 | WEIGHT: 216 LBS | OXYGEN SATURATION: 99 % | DIASTOLIC BLOOD PRESSURE: 80 MMHG | HEART RATE: 68 BPM | SYSTOLIC BLOOD PRESSURE: 112 MMHG

## 2021-05-16 VITALS
BODY MASS INDEX: 35.49 KG/M2 | HEART RATE: 55 BPM | DIASTOLIC BLOOD PRESSURE: 82 MMHG | TEMPERATURE: 98.6 F | RESPIRATION RATE: 16 BRPM | HEIGHT: 67 IN | WEIGHT: 226.12 LBS | SYSTOLIC BLOOD PRESSURE: 110 MMHG | OXYGEN SATURATION: 98 %

## 2021-05-16 VITALS
RESPIRATION RATE: 14 BRPM | HEIGHT: 67 IN | TEMPERATURE: 97.6 F | SYSTOLIC BLOOD PRESSURE: 116 MMHG | WEIGHT: 218.12 LBS | BODY MASS INDEX: 34.24 KG/M2 | OXYGEN SATURATION: 97 % | DIASTOLIC BLOOD PRESSURE: 80 MMHG | HEART RATE: 90 BPM

## 2021-07-16 ENCOUNTER — OFFICE VISIT (OUTPATIENT)
Dept: UROLOGY | Facility: CLINIC | Age: 31
End: 2021-07-16

## 2021-07-16 VITALS — RESPIRATION RATE: 16 BRPM | HEIGHT: 68 IN | WEIGHT: 222 LBS | BODY MASS INDEX: 33.65 KG/M2

## 2021-07-16 DIAGNOSIS — N30.90 CYSTITIS: Primary | ICD-10-CM

## 2021-07-16 DIAGNOSIS — N45.1 EPIDIDYMITIS: ICD-10-CM

## 2021-07-16 PROBLEM — F41.9 ANXIETY: Status: ACTIVE | Noted: 2021-07-16

## 2021-07-16 LAB
BILIRUB BLD-MCNC: ABNORMAL MG/DL
CLARITY, POC: CLEAR
COLOR UR: ABNORMAL
GLUCOSE UR STRIP-MCNC: NEGATIVE MG/DL
KETONES UR QL: NEGATIVE
LEUKOCYTE EST, POC: NEGATIVE
NITRITE UR-MCNC: NEGATIVE MG/ML
PH UR: 6 [PH] (ref 5–8)
PROT UR STRIP-MCNC: ABNORMAL MG/DL
RBC # UR STRIP: ABNORMAL /UL
SP GR UR: 1.03 (ref 1–1.03)
UROBILINOGEN UR QL: NORMAL

## 2021-07-16 PROCEDURE — 99213 OFFICE O/P EST LOW 20 MIN: CPT | Performed by: NURSE PRACTITIONER

## 2021-07-16 PROCEDURE — 81003 URINALYSIS AUTO W/O SCOPE: CPT | Performed by: NURSE PRACTITIONER

## 2021-07-16 RX ORDER — LACTOBACILLUS ACIDOPHILUS 20B CELL
1 CAPSULE ORAL DAILY
Qty: 28 CAPSULE | Refills: 0 | Status: SHIPPED | OUTPATIENT
Start: 2021-07-16 | End: 2022-08-25

## 2021-07-16 RX ORDER — DOXYCYCLINE HYCLATE 100 MG/1
100 CAPSULE ORAL 2 TIMES DAILY
Qty: 42 CAPSULE | Refills: 0 | Status: SHIPPED | OUTPATIENT
Start: 2021-07-16 | End: 2021-08-06

## 2021-07-16 RX ORDER — PREDNISONE 50 MG/1
50 TABLET ORAL DAILY
Qty: 5 TABLET | Refills: 0 | Status: SHIPPED | OUTPATIENT
Start: 2021-07-16 | End: 2021-07-21

## 2021-07-16 NOTE — PROGRESS NOTES
Chief Complaint: Testicle Pain (SWELLING, RADIATING TO GROIN AND BLADDER, PATIENT HAD A VASECTOMY A YEAR AGO, PATIENT STATES HE HAS HAD SWELLING SINCE )    Subjective         History of Present Illness  Hamlet Kong is a 31 y.o. male presents to [facility] to be seen for testicular pain and swelling with radiation of pain to groin and suprapubic area.    He is 1 year SP vasectomy. Post vas he developed a hematoma which resolved on its own.    He started last evening with acute left-sided testicular pain and swelling.  He states that the pain has improved somewhat today however he is still with some significant tenderness.        Objective     Past Medical History:   Diagnosis Date   • Anxiety        Past Surgical History:   Procedure Laterality Date   • CYST REMOVAL  2006   • VASECTOMY      06/2020         Current Outpatient Medications:   •  sertraline (ZOLOFT) 50 MG tablet, , Disp: , Rfl:   •  doxycycline (VIBRAMYCIN) 100 MG capsule, Take 1 capsule by mouth 2 (Two) Times a Day for 21 days., Disp: 42 capsule, Rfl: 0  •  Lactobacillus (Florajen Acidophilus) capsule, Take 1 capsule by mouth Daily., Disp: 28 capsule, Rfl: 0  •  predniSONE (DELTASONE) 50 MG tablet, Take 1 tablet by mouth Daily for 5 days., Disp: 5 tablet, Rfl: 0    No Known Allergies     Family History   Problem Relation Age of Onset   • Diabetes Father    • Stroke Other        Social History     Socioeconomic History   • Marital status:      Spouse name: Not on file   • Number of children: Not on file   • Years of education: Not on file   • Highest education level: Not on file   Tobacco Use   • Smoking status: Never Smoker   • Smokeless tobacco: Never Used   Vaping Use   • Vaping Use: Never used   Substance and Sexual Activity   • Alcohol use: Yes     Alcohol/week: 6.0 standard drinks     Types: 6 Cans of beer per week   • Drug use: Never   • Sexual activity: Defer       Vital Signs:   Resp 16   Ht 172.7 cm  "(68\")   Wt 101 kg (222 lb)   BMI 33.75 kg/m²      Physical Exam  Genitourinary:     Testes: Cremasteric reflex is present.         Right: Tenderness (Exquisite) and swelling (Mild) present.      Epididymis:      Right: Inflamed. Tenderness present.          Result Review :   The following data was reviewed by: LUCILA Hearn on 07/16/2021:  Results for orders placed or performed in visit on 07/16/21   POC Urinalysis Dipstick, Automated    Specimen: Urine   Result Value Ref Range    Color Dark Yellow Yellow, Straw, Dark Yellow, Teodora    Clarity, UA Clear Clear    Specific Gravity  1.030 1.005 - 1.030    pH, Urine 6.0 5.0 - 8.0    Leukocytes Negative Negative    Nitrite, UA Negative Negative    Protein, POC 30 mg/dL (A) Negative mg/dL    Glucose, UA Negative Negative, 1000 mg/dL (3+) mg/dL    Ketones, UA Negative Negative    Urobilinogen, UA Normal Normal    Bilirubin Small (1+) (A) Negative    Blood, UA Trace (A) Negative            Procedures        Assessment and Plan    Diagnoses and all orders for this visit:    1. Cystitis (Primary)  -     POC Urinalysis Dipstick, Automated    2. Epididymitis  -     doxycycline (VIBRAMYCIN) 100 MG capsule; Take 1 capsule by mouth 2 (Two) Times a Day for 21 days.  Dispense: 42 capsule; Refill: 0  -     predniSONE (DELTASONE) 50 MG tablet; Take 1 tablet by mouth Daily for 5 days.  Dispense: 5 tablet; Refill: 0  -     Lactobacillus (Florajen Acidophilus) capsule; Take 1 capsule by mouth Daily.  Dispense: 28 capsule; Refill: 0    Discussed with patient that findings on physical exam are consistent with acute epididymitis.  We will treat him with antibiotic therapy and steroids to see if we can gain better control of his symptoms.  Did discuss use of probiotic while on long-term antibiotic therapy.       I spent 10 minutes caring for Hamlet on this date of service. This time includes time spent by me in the following activities:reviewing tests, obtaining and/or reviewing a " separately obtained history, performing a medically appropriate examination and/or evaluation , counseling and educating the patient/family/caregiver, ordering medications, tests, or procedures, and documenting information in the medical record  Follow Up   Return in about 4 weeks (around 8/13/2021), or if symptoms worsen or fail to improve, for Follow-up epididymitis.  Patient was given instructions and counseling regarding his condition or for health maintenance advice. Please see specific information pulled into the AVS if appropriate.

## 2021-08-11 ENCOUNTER — TELEPHONE (OUTPATIENT)
Dept: INTERNAL MEDICINE | Facility: CLINIC | Age: 31
End: 2021-08-11

## 2021-08-11 NOTE — TELEPHONE ENCOUNTER
Caller: DEBRA MARTINEZ    Relationship: Emergency Contact    Best call back number: [phone]    Who are you requesting to speak with (clinical staff, provider,  specific staff member): DR. XIE     What was the call regarding: PATIENTS WIFE WOULD LIKE TO SPEAK WITH DR. XIE REGARDING HER HUSBANDS ANXIETY. PLEASE ADVISE     Do you require a callback: YES

## 2021-08-19 NOTE — TELEPHONE ENCOUNTER
Left message for patient to call back at her earliest convenience and that we can attempt to help her and her .

## 2021-08-19 NOTE — TELEPHONE ENCOUNTER
Please tell her I will try to call her later today but with Covid being so bad I am not sure when I will get to, please put a sticky note on my desk with her name and phone number and ask if there is anything she would be willing to tell you about that I could help with

## 2021-08-22 RX ORDER — VILAZODONE HYDROCHLORIDE 10 MG-20MG
10 KIT ORAL DAILY
Qty: 1 EACH | Refills: 0 | Status: SHIPPED | OUTPATIENT
Start: 2021-08-22 | End: 2021-09-09 | Stop reason: ALTCHOICE

## 2021-09-09 RX ORDER — VORTIOXETINE 5 MG/1
5 TABLET, FILM COATED ORAL
Qty: 30 TABLET | Refills: 2 | Status: SHIPPED | OUTPATIENT
Start: 2021-09-09 | End: 2021-12-07 | Stop reason: SDUPTHER

## 2021-12-07 RX ORDER — VORTIOXETINE 5 MG/1
5 TABLET, FILM COATED ORAL
Qty: 90 TABLET | Refills: 1 | Status: SHIPPED | OUTPATIENT
Start: 2021-12-07 | End: 2022-01-07 | Stop reason: SDUPTHER

## 2022-01-07 RX ORDER — VORTIOXETINE 5 MG/1
5 TABLET, FILM COATED ORAL
Qty: 90 TABLET | Refills: 1 | Status: SHIPPED | OUTPATIENT
Start: 2022-01-07 | End: 2022-04-21

## 2022-01-18 NOTE — PROGRESS NOTES
"  Subjective               HPI   Hamlet Kong is a 31 y.o. male who presents to [facility] as a consult   for gynecomastia.   Chief Complaint   Patient presents with   • Consult     chest liposuction       possible chest liposuction       Allergies Reconciled. No known allergies           Review of Systems  All system were reviewed and were negative, except the ones noted above.     Objective     Ht 170.2 cm (67\")   Wt 104 kg (229 lb 9.6 oz)   BMI 35.96 kg/m²     Body mass index is 35.96 kg/m².    Physical Exam PE:  Height 170.2 cm (67\"), weight 104 kg (229 lb 9.6 oz).  Awake, alert, oriented  Neck: supple, trachea midline  Chest: respirations are non elaborated  Heart: non tachycardic  Abdomen: soft, nt, nd  Extr: no edema    Breasts with excessive skin and gland. Nipples of appropriate size.   •     Result Review :   The following data was reviewed by: Jennifer Smith MD on 01/27/2022:    Procedures              Diagnoses and all orders for this visit:    1. Gynecomastia (Primary)        Plan:    I explained to patient he would benefit from direct excision of skin and gland. Because of the large amount of tissue to be resected, I would recommend removal of the nipple and repositioning as a nipple graft. I explained to patient who verbalized understanding.   If he plans to proceed with surgery, this would be a 2 hours surgery under general anesthesia.       Follow Up     No follow-ups on file.    Patient was given instructions and counseling regarding his condition. Please see specific information pulled into the AVS if appropriate.     Jennifer Smith MD  01/27/2022  "

## 2022-01-27 ENCOUNTER — OFFICE VISIT (OUTPATIENT)
Dept: PLASTIC SURGERY | Facility: CLINIC | Age: 32
End: 2022-01-27

## 2022-01-27 VITALS — BODY MASS INDEX: 36.03 KG/M2 | HEIGHT: 67 IN | WEIGHT: 229.6 LBS

## 2022-01-27 DIAGNOSIS — N62 GYNECOMASTIA: Primary | ICD-10-CM

## 2022-01-27 PROCEDURE — COS63: Performed by: SURGERY

## 2022-01-28 PROBLEM — N62 GYNECOMASTIA: Status: ACTIVE | Noted: 2022-01-28

## 2022-03-16 ENCOUNTER — PREP FOR SURGERY (OUTPATIENT)
Dept: OTHER | Facility: HOSPITAL | Age: 32
End: 2022-03-16

## 2022-03-16 DIAGNOSIS — N62 GYNECOMASTIA: Primary | ICD-10-CM

## 2022-03-16 RX ORDER — CEFAZOLIN SODIUM 2 G/100ML
2 INJECTION, SOLUTION INTRAVENOUS ONCE
Status: CANCELLED | OUTPATIENT
Start: 2022-03-16 | End: 2022-03-16

## 2022-03-29 ENCOUNTER — OFFICE VISIT (OUTPATIENT)
Dept: INTERNAL MEDICINE | Facility: CLINIC | Age: 32
End: 2022-03-29

## 2022-03-29 VITALS
WEIGHT: 228.2 LBS | RESPIRATION RATE: 14 BRPM | SYSTOLIC BLOOD PRESSURE: 132 MMHG | HEART RATE: 74 BPM | DIASTOLIC BLOOD PRESSURE: 90 MMHG | OXYGEN SATURATION: 99 % | HEIGHT: 67 IN | TEMPERATURE: 97.4 F | BODY MASS INDEX: 35.82 KG/M2

## 2022-03-29 DIAGNOSIS — H92.03 EAR PAIN, BILATERAL: ICD-10-CM

## 2022-03-29 DIAGNOSIS — Z13.29 THYROID DISORDER SCREEN: ICD-10-CM

## 2022-03-29 DIAGNOSIS — Z13.220 LIPID SCREENING: ICD-10-CM

## 2022-03-29 DIAGNOSIS — F41.9 ANXIETY: Primary | ICD-10-CM

## 2022-03-29 DIAGNOSIS — Z00.00 ANNUAL PHYSICAL EXAM: ICD-10-CM

## 2022-03-29 PROCEDURE — 99213 OFFICE O/P EST LOW 20 MIN: CPT | Performed by: NURSE PRACTITIONER

## 2022-03-29 RX ORDER — HYDROXYZINE HYDROCHLORIDE 25 MG/1
25 TABLET, FILM COATED ORAL 3 TIMES DAILY PRN
Qty: 30 TABLET | Refills: 0 | Status: SHIPPED | OUTPATIENT
Start: 2022-03-29 | End: 2022-05-26 | Stop reason: SDDI

## 2022-03-30 PROBLEM — H92.03 EAR PAIN, BILATERAL: Status: ACTIVE | Noted: 2022-03-30

## 2022-03-30 NOTE — ASSESSMENT & PLAN NOTE
Patient is doing overall much better on the Trintellix.  He will continue to take that.  We will give hydroxyzine a chance to see if it will help reduce his level of anxiety when he is in an acute attack.  He does not recall being on this medication before directed on its use.  Advised that if this is not helpful to please follow back up.  Patient agrees and understands.

## 2022-03-30 NOTE — ASSESSMENT & PLAN NOTE
Physical exam is reassuring.  Did encourage moisturizing creams for the external part of the ear.  He reports this is usually all that it needs.  No need for any further treatment at this time.

## 2022-03-30 NOTE — PROGRESS NOTES
Chief Complaint  Earache and Anxiety    Subjective         Hamlet Kong presents to [facility] for Concerns of earache and anxiety.    Patient reports that over the last several days he has noticed pressure in both ears.  He does not report any discharge, or any significant pain.  He denies any fever, chills, fatigue.  He states that when his ears feel this way they can cause him to be dizzy, dizziness is a trigger for his anxiety which is longstanding and being treated currently with Trintellix.  Patient has been on Trintellix for approximately 6 months, patient reports good compliance with the medication.  Overall, he reports a significant reduction in his anxiety, but he does still have certain triggers that will cause him to have panic attacks.  These panic attacks will happen at random, the last major significant one was in January 2021 which did result in an ER visit as patient was having significant symptoms.  Since then his PCP, Dr. Mily Braun has been working with him to try to reduce his anxiety, they have tried different medications including BuSpar and Zoloft.  Today, he wants to ensure that there is nothing going on with his ears.  He also wants to see if there is something that can be used on an as-needed basis for when he does start to have a panic attack.  He admits that BuSpar did not provide a significant change, and he was taking it 3 times daily regardless of level of anxiety.    Patient is due for annual physical exam, he has not had any lab work since January 2021.  He would like to go ahead and get lab work drawn today, and would schedule physical exam at a later date.    Otherwise, patient does not have any significant concerns or complaints today.         Review of Systems   Constitutional: Negative for chills, fatigue and fever.   HENT: Positive for ear pain. Negative for congestion, ear discharge, sinus pressure, sneezing and sore throat.   "  Respiratory: Negative for cough and shortness of breath.    Gastrointestinal: Negative for diarrhea, nausea and vomiting.   Neurological: Positive for dizziness. Negative for syncope, weakness and headaches.   Psychiatric/Behavioral: Negative for dysphoric mood. The patient is nervous/anxious.        Objective   Vital Signs:   /90   Pulse 74   Temp 97.4 °F (36.3 °C) (Temporal)   Resp 14   Ht 170.2 cm (67\")   Wt 104 kg (228 lb 3.2 oz)   SpO2 99%   BMI 35.74 kg/m²     Physical Exam  Vitals and nursing note reviewed.   Constitutional:       Appearance: Normal appearance.   HENT:      Head: Normocephalic and atraumatic.      Right Ear: Tympanic membrane, ear canal and external ear normal.      Left Ear: Tympanic membrane, ear canal and external ear normal.      Ears:      Comments: Moderate dryness noted to the external ear.     Nose: Nose normal.      Mouth/Throat:      Mouth: Mucous membranes are moist.      Pharynx: Oropharynx is clear.   Pulmonary:      Effort: Pulmonary effort is normal.   Neurological:      Mental Status: He is alert.   Psychiatric:         Mood and Affect: Mood normal.         Behavior: Behavior normal.         Thought Content: Thought content normal.         Judgment: Judgment normal.        Result Review :                   Diagnoses and all orders for this visit:    1. Anxiety (Primary)  Assessment & Plan:  Patient is doing overall much better on the Trintellix.  He will continue to take that.  We will give hydroxyzine a chance to see if it will help reduce his level of anxiety when he is in an acute attack.  He does not recall being on this medication before directed on its use.  Advised that if this is not helpful to please follow back up.  Patient agrees and understands.      2. Lipid screening  -     Lipid Panel; Future    3. Thyroid disorder screen  -     TSH; Future    4. Annual physical exam  -     Comprehensive Metabolic Panel; Future  -     CBC & Differential; " Future    5. Ear pain, bilateral  Assessment & Plan:  Physical exam is reassuring.  Did encourage moisturizing creams for the external part of the ear.  He reports this is usually all that it needs.  No need for any further treatment at this time.      Other orders  -     hydrOXYzine (ATARAX) 25 MG tablet; Take 1 tablet by mouth 3 (Three) Times a Day As Needed for Anxiety.  Dispense: 30 tablet; Refill: 0        Follow Up   No follow-ups on file.  Patient was given instructions and counseling regarding his condition or for health maintenance advice. Please see specific information pulled into the AVS if appropriate.     Rah Valdivia, APRN  3/30/2022  This note was electronically signed.

## 2022-04-04 ENCOUNTER — DOCUMENTATION (OUTPATIENT)
Dept: INTERNAL MEDICINE | Facility: CLINIC | Age: 32
End: 2022-04-04

## 2022-04-18 ENCOUNTER — TELEPHONE (OUTPATIENT)
Dept: INTERNAL MEDICINE | Facility: CLINIC | Age: 32
End: 2022-04-18

## 2022-04-18 NOTE — TELEPHONE ENCOUNTER
Red rule verified and correct.    Pt wants an appt to discuss his medication - trintellix.    He also needs to get labs drawn, bur will need to do over weekend.    Appt made.

## 2022-04-20 ENCOUNTER — OFFICE VISIT (OUTPATIENT)
Dept: INTERNAL MEDICINE | Facility: CLINIC | Age: 32
End: 2022-04-20

## 2022-04-20 VITALS
RESPIRATION RATE: 12 BRPM | SYSTOLIC BLOOD PRESSURE: 120 MMHG | BODY MASS INDEX: 35.47 KG/M2 | HEART RATE: 97 BPM | HEIGHT: 67 IN | OXYGEN SATURATION: 98 % | WEIGHT: 226 LBS | TEMPERATURE: 96.5 F | DIASTOLIC BLOOD PRESSURE: 64 MMHG

## 2022-04-20 DIAGNOSIS — F41.8 ANXIETY WITH DEPRESSION: Primary | ICD-10-CM

## 2022-04-20 PROCEDURE — 99213 OFFICE O/P EST LOW 20 MIN: CPT | Performed by: NURSE PRACTITIONER

## 2022-04-20 RX ORDER — BUSPIRONE HYDROCHLORIDE 10 MG/1
10 TABLET ORAL 3 TIMES DAILY
Qty: 90 TABLET | Refills: 1 | Status: SHIPPED | OUTPATIENT
Start: 2022-04-20 | End: 2022-05-26 | Stop reason: SDDI

## 2022-04-20 NOTE — ASSESSMENT & PLAN NOTE
Advised patient to continue BuSpar, we can try 10 mg twice daily, with a third dose if needed.  Advised patient to keep an extra dose with him when he is working in the event that he feels like he needs it.  Stop Trintellix totally.  We will go ahead and refer to psychiatry, I do feel like it would be beneficial for psychotherapy as well as possible medication management.  He specifically asked about this, so I commended him for bringing this up.  We will follow along and assist as needed.

## 2022-04-20 NOTE — PROGRESS NOTES
"Chief Complaint  Anxiety and Medication Problem    Subjective         Hamlet Kong presents to [facility] for Concerns regarding anxiety and medication.  Patient was last seen by me on 3/29/2022, he was following up on Trintellix and buspirone.  Patient states that he was doing okay on the Trintellix however he was not sure buspirone was helpful, it was difficult for him to take 3 times a day.  So he had stopped taking that 1 in particular.  He was hoping that there will be something else that he could use on an as-needed basis.  We prescribed hydroxyzine at the time, advised to follow-up if he needed to.  Patient reports that he was feeling very off on the Trintellix, he had not been using hydroxyzine, so he did stop taking the Trintellix and started taking BuSpar.  Patient states he has been taking BuSpar 5 mg 3 times a day for the last couple of weeks.  Patient feels like it is working very well, however it is difficult for him to take the dose 3 times a day.  He was curious if we could give him extended release or higher dose that would get him with twice a day dosing.  Patient is also wanting to discuss psychotherapy, he feels like it would be very beneficial.         Review of Systems    Objective   Vital Signs:   /64   Pulse 97   Temp 96.5 °F (35.8 °C) (Temporal)   Resp 12   Ht 170.2 cm (67\")   Wt 103 kg (226 lb)   SpO2 98%   BMI 35.40 kg/m²     Physical Exam  Vitals and nursing note reviewed.   Constitutional:       Appearance: Normal appearance.   HENT:      Head: Normocephalic and atraumatic.   Pulmonary:      Effort: Pulmonary effort is normal.   Neurological:      Mental Status: He is alert.   Psychiatric:         Mood and Affect: Mood normal.         Thought Content: Thought content normal.        Result Review :                   Diagnoses and all orders for this visit:    1. Anxiety with depression (Primary)  Assessment & Plan:  Advised patient to " continue BuSpar, we can try 10 mg twice daily, with a third dose if needed.  Advised patient to keep an extra dose with him when he is working in the event that he feels like he needs it.  Stop Trintellix totally.  We will go ahead and refer to psychiatry, I do feel like it would be beneficial for psychotherapy as well as possible medication management.  He specifically asked about this, so I commended him for bringing this up.  We will follow along and assist as needed.    Orders:  -     Ambulatory Referral to Psychiatry    Other orders  -     busPIRone (BUSPAR) 10 MG tablet; Take 1 tablet by mouth 3 (Three) Times a Day.  Dispense: 90 tablet; Refill: 1        Follow Up   No follow-ups on file.  Patient was given instructions and counseling regarding his condition or for health maintenance advice. Please see specific information pulled into the AVS if appropriate.     LUCILA Aguilar  4/20/2022  This note was electronically signed.

## 2022-04-21 PROBLEM — F41.9 ANXIETY: Status: RESOLVED | Noted: 2021-07-16 | Resolved: 2022-04-21

## 2022-04-21 PROBLEM — E66.811 CLASS 1 OBESITY: Status: ACTIVE | Noted: 2022-04-21

## 2022-04-21 PROBLEM — E66.9 CLASS 1 OBESITY: Status: ACTIVE | Noted: 2022-04-21

## 2022-04-21 RX ORDER — DESVENLAFAXINE 25 MG/1
25 TABLET, EXTENDED RELEASE ORAL DAILY
Qty: 90 TABLET | Refills: 1 | Status: SHIPPED | OUTPATIENT
Start: 2022-04-21 | End: 2022-05-19

## 2022-04-21 NOTE — PROGRESS NOTES
Patient's wife called me to let me know he was not doing very well with his anxiety.  I saw where he had just had a visit with Mr. Valdivia and agree with his plan to see psychiatry however I called Mr. Kong and discussed with him the possibility of trying Pristiq as we wait for the psychiatry appointment.  Discussed risks and benefits and he is willing to try this medication therefore will call in 25 mg.  Told him if after 2 to 3 weeks he feels like it is not strong enough could increase to 50 mg daily.  He will monitor for worsening anxiety or depression.  Told him that he can use BuSpar as needed however if he uses the BuSpar with the Pristiq he will monitor for signs of serotonin syndrome such as flushing sweating etc.

## 2022-05-19 ENCOUNTER — DOCUMENTATION (OUTPATIENT)
Dept: INTERNAL MEDICINE | Facility: CLINIC | Age: 32
End: 2022-05-19

## 2022-05-19 RX ORDER — ESCITALOPRAM OXALATE 10 MG/1
10 TABLET ORAL DAILY
Qty: 90 TABLET | Refills: 1 | Status: SHIPPED | OUTPATIENT
Start: 2022-05-19 | End: 2022-07-08 | Stop reason: SDUPTHER

## 2022-05-19 NOTE — PROGRESS NOTES
Spoke with patient stating he feels like his anxiety is getting out of control he is also worried he may have depression and/or OCD. States that he just has thoughts that he cannot get out of his mind and he is spending hours online looking up his diagnoses. He does have an appointment with Dr. Amaya next week. I asked him of all the meds he has tried in the past which when he like the best. He stated he thought that the Lexapro probably worked the best for him. He did recently try Pristiq for a few weeks but just felt like it did not make him feel quite right. So he stopped it. Will call in Soc[employer] for him and will have him follow-up with Dr. Amaya next week for further treatment.
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
## 2022-05-25 NOTE — PATIENT INSTRUCTIONS
1.  Please return to clinic at your next scheduled visit.  Contact the clinic ([phone]) at least 24 hours prior in the event you need to cancel.  2.  Do no harm to yourself or others.    3.  Avoid alcohol and drugs.    4.  Take all medications as prescribed.  Please contact the clinic with any concerns. If you are in need of medication refills, please call the clinic at [phone].    5. Should you want to get in touch with your provider, Dr. José Amaya, please utilize [facility] or contact the office ([phone]), and staff will be able to page Dr. Amaya directly.  6.  In the event you have personal crisis, contact the following crisis numbers: Suicide Prevention Hotline 1-136.426.7355; MICAH Helpline 4-792-663-IHYU; [facility] Emergency Room [phone]; text HELLO to 287143; or 101.     SPECIFIC RECOMMENDATIONS:     1.      Medications discussed at this encounter:                   -Continue Lexapro and start Ativan     2.      Psychotherapy recommendations:      3.     Return to clinic: 6 weeks

## 2022-05-26 ENCOUNTER — OFFICE VISIT (OUTPATIENT)
Dept: PSYCHIATRY | Facility: CLINIC | Age: 32
End: 2022-05-26

## 2022-05-26 VITALS
BODY MASS INDEX: 35.08 KG/M2 | HEIGHT: 67 IN | DIASTOLIC BLOOD PRESSURE: 81 MMHG | WEIGHT: 223.5 LBS | SYSTOLIC BLOOD PRESSURE: 125 MMHG

## 2022-05-26 DIAGNOSIS — F51.05 INSOMNIA DUE TO MENTAL CONDITION: ICD-10-CM

## 2022-05-26 DIAGNOSIS — F41.0 PANIC ATTACKS: ICD-10-CM

## 2022-05-26 DIAGNOSIS — F45.21 ILLNESS ANXIETY DISORDER: Primary | ICD-10-CM

## 2022-05-26 DIAGNOSIS — F41.1 GENERALIZED ANXIETY DISORDER: ICD-10-CM

## 2022-05-26 DIAGNOSIS — F33.1 MAJOR DEPRESSIVE DISORDER, RECURRENT EPISODE, MODERATE: ICD-10-CM

## 2022-05-26 PROCEDURE — 90792 PSYCH DIAG EVAL W/MED SRVCS: CPT | Performed by: STUDENT IN AN ORGANIZED HEALTH CARE EDUCATION/TRAINING PROGRAM

## 2022-05-26 RX ORDER — LORAZEPAM 0.5 MG/1
0.5 TABLET ORAL DAILY PRN
Qty: 15 TABLET | Refills: 2 | Status: SHIPPED | OUTPATIENT
Start: 2022-05-26

## 2022-06-13 RX ORDER — BUSPIRONE HYDROCHLORIDE 10 MG/1
TABLET ORAL
Qty: 90 TABLET | Refills: 1 | OUTPATIENT
Start: 2022-06-13

## 2022-07-08 ENCOUNTER — OFFICE VISIT (OUTPATIENT)
Dept: PSYCHIATRY | Facility: CLINIC | Age: 32
End: 2022-07-08

## 2022-07-08 VITALS
DIASTOLIC BLOOD PRESSURE: 81 MMHG | HEIGHT: 67 IN | SYSTOLIC BLOOD PRESSURE: 128 MMHG | WEIGHT: 231 LBS | BODY MASS INDEX: 36.26 KG/M2

## 2022-07-08 DIAGNOSIS — F41.1 GENERALIZED ANXIETY DISORDER: ICD-10-CM

## 2022-07-08 DIAGNOSIS — F45.21 ILLNESS ANXIETY DISORDER: ICD-10-CM

## 2022-07-08 DIAGNOSIS — F51.05 INSOMNIA DUE TO MENTAL CONDITION: ICD-10-CM

## 2022-07-08 DIAGNOSIS — F41.0 PANIC ATTACKS: ICD-10-CM

## 2022-07-08 DIAGNOSIS — F33.1 MAJOR DEPRESSIVE DISORDER, RECURRENT EPISODE, MODERATE: Primary | ICD-10-CM

## 2022-07-08 PROCEDURE — 90833 PSYTX W PT W E/M 30 MIN: CPT | Performed by: STUDENT IN AN ORGANIZED HEALTH CARE EDUCATION/TRAINING PROGRAM

## 2022-07-08 PROCEDURE — 99214 OFFICE O/P EST MOD 30 MIN: CPT | Performed by: STUDENT IN AN ORGANIZED HEALTH CARE EDUCATION/TRAINING PROGRAM

## 2022-07-08 RX ORDER — ESCITALOPRAM OXALATE 10 MG/1
15 TABLET ORAL DAILY
Qty: 45 TABLET | Refills: 2 | Status: SHIPPED | OUTPATIENT
Start: 2022-07-08 | End: 2022-11-04

## 2022-07-08 NOTE — TREATMENT PLAN
Multi-Disciplinary Problems (from Behavioral Health Treatment Plan)    Active Problems     Problem: Anxiety  Start Date: 07/08/22    Problem Details: The patient self-scales this problem as a 6 with 10 being the worst.        Goal Priority Start Date Expected End Date End Date    Patient will develop and implement behavioral and cognitive strategies to reduce anxiety and irrational fears. -- 07/08/22 -- --    Goal Details: Progress toward goal:  Not appropriate to rate progress toward goal since this is the initial treatment plan.        Goal Intervention Frequency Start Date End Date    Help patient explore past emotional issues in relation to present anxiety. Q Month 07/08/22 --    Intervention Details: Duration of treatment until until remission of symptoms.        Goal Intervention Frequency Start Date End Date    Help patient develop an awareness of their cognitive and physical responses to anxiety. Q Month 07/08/22 --    Intervention Details: Duration of treatment until until remission of symptoms.              Problem: Depression  Start Date: 07/08/22    Problem Details: The patient self-scales this problem as a 1 with 10 being the worst.        Goal Priority Start Date Expected End Date End Date    Patient will demonstrate the ability to initiate new constructive life skills outside of sessions on a consistent basis. -- 07/08/22 -- --    Goal Details: Progress toward goal:  Not appropriate to rate progress toward goal since this is the initial treatment plan.        Goal Intervention Frequency Start Date End Date    Assist patient in setting attainable activities of daily living goals. PRN 07/08/22 --    Goal Intervention Frequency Start Date End Date    Provide education about depression Q Month 07/08/22 --    Intervention Details: Duration of treatment until until remission of symptoms.        Goal Intervention Frequency Start Date End Date    Assist patient in developing healthy coping strategies. Q Month  07/08/22 --    Intervention Details: Duration of treatment until until remission of symptoms.              Problem: Obsessive/Compulsive  Start Date: 07/08/22    Problem Details: The patient self-scales this problem as a 4 with 10 being the worst.        Goal Priority Start Date Expected End Date End Date    Patient will decrease frequency of obsessive/compulsive behaviors that interfere with daily functioning. -- 07/08/22 -- --    Goal Details: Progress toward goal:  Not appropriate to rate progress toward goal since this is the initial treatment plan.        Goal Intervention Frequency Start Date End Date    Assist patient in learning thought stopping and self talk techniques that cognitively and behaviorally reduce obsessions and compulsions. Q Month 07/08/22 --    Intervention Details: Duration of treatment until until remission of symptoms.                    Reviewed By     José Amaya MD 07/08/22 [id number]                 I have discussed and reviewed this treatment plan with the patient.

## 2022-07-08 NOTE — PROGRESS NOTES
"Subjective   Hamlet Kong is a 32 y.o. male who presents today for initial evaluation     Referring Provider:  No referring provider defined for this encounter.    Chief Complaint: Anxiety and depression    History of Present Illness:     5/25: Chart review: Significant anxiety and depression.  Recently seen by primary care April 20.  Dr. Braun started the patient on Pristiq 25 mg a day along with BuSpar 10 twice daily.  Patient tried and failed Trintellix and hydroxyzine.  Also interested in psychotherapy.  Anxiety since at least 2018.  Failed Lexapro and Zoloft, vilazodone, pristiq.  PDMP is empty.  January labs show elevated glucose 179, low sodium 31, chloride 96 low, otherwise reassuring CMP, thyroid studies, lipids, iron studies, some abnormalities on the CBC, but white count, hemoglobin and hematocrit and platelets are within normal limits.  Holter monitor in October 2020 showing periods of sinus tachycardia.  January 2021: Sinus 406.  No head imaging.  Not a lot in Care Everywhere.  May have been restarted on Lexapro 10 mg a day a few days back.    \"Hamlet\"    7/8: In person interview:  1. Chart review: No new.  Sees Daniella for psychotherapy soon.  2. Planning: Started Ativan at last visit to complement Lexapro 10 mg Daily.  Consider increasing Lexapro.  3. \"It's been better.\"  a. Used ativan once, helpful.  b. For 2 weeks I felt great, then it came back.  c. Not wanting to do anything \"comes and goes\" for an hour or so  d. Lack of motivation  e. Constant worry about symptoms  f. Tolerating lexapro, no sexual SE.  g. When has intrusive thoughts:  i. A game he plays  ii. Work  iii. podcasts help  iv. Mindfulness education today, also box breathing.  4. Mood/Depression: 1/10, brief episodes of anhedonia  5. Anxiety: 6/10, on edge  6. Therapy: Daniella in September 7. Medication compliant: y  8. No SI HI AVH.      5/26: In person.  Interview:  9. Chart review:   10. His/Her Story: \"I'm kindof a " "hypochondriac.\"  a. P10, G14  b. Describes obsessions that occur re: medication SE. Compulsions of reading voraciously on them over the internet.  He is always worried that he is sick.  Usually he does not have symptoms.  Sometimes he does though (somatic symptom disorder).  i. The anxiety gets so bad that he sometimes, rarely, has panic attacks.  He was seen in the ER last year in January for a panic attack.  c. Possible OCD.  d. Only stopped lexapro before due to sexual side effects in the past. Has never been on bupropion.  It is working quite well for him now.  \"It has calmed me down and only a week.\"  e. Some residual depressed mood, insomnia, feelings of guilt, poor energy and concentration related to the illness anxiety disorder.  Duration is years.  f. Some anxiety obviously related to the above: Uncontrolled worrying, restlessness, irritability, feeling on edge, insomnia, poor energy and concentration.  \"All that anxiety leaves me exhausted.\"  11. Substances: CBD Gummies he bought over-the-counter.  Has been using them for about a week.  Denies all else except social alcohol.  12. Therapy: Has never tried, interested  13. Medication compliant: Yes  14. Psych ROS: Depression, anxiety, illness anxiety disorder.  Negative for psychosis and jay.  15. No SI HI AVH.    Access to Firearms: Denies    PHQ-9 Depression Screening  PHQ-9 Total Score:      Little interest or pleasure in doing things?     Feeling down, depressed, or hopeless?     Trouble falling or staying asleep, or sleeping too much?     Feeling tired or having little energy?     Poor appetite or overeating?     Feeling bad about yourself - or that you are a failure or have let yourself or your family down?     Trouble concentrating on things, such as reading the newspaper or watching television?     Moving or speaking so slowly that other people could have noticed? Or the opposite - being so fidgety or restless that you have been moving around a lot " more than usual?     Thoughts that you would be better off dead, or of hurting yourself in some way?     PHQ-9 Total Score       CÉSAR-7       Past Surgical History:  Past Surgical History:   Procedure Laterality Date   • CYST REMOVAL     • VASECTOMY      2020       Problem List:  Patient Active Problem List   Diagnosis   • Gynecomastia   • Ear pain, bilateral   • Anxiety with depression   • Class 1 obesity       Allergy:   No Known Allergies     Discontinued Medications:  Medications Discontinued During This Encounter   Medication Reason   • escitalopram (Lexapro) 10 MG tablet Reorder       Current Medications:   Current Outpatient Medications   Medication Sig Dispense Refill   • escitalopram (Lexapro) 10 MG tablet Take 1.5 tablets by mouth Daily. 45 tablet 2   • Lactobacillus (Florajen Acidophilus) capsule Take 1 capsule by mouth Daily. 28 capsule 0   • LORazepam (Ativan) 0.5 MG tablet Take 1 tablet by mouth Daily As Needed for Anxiety. 15 tablet 2     No current facility-administered medications for this visit.       Past Medical History:  Past Medical History:   Diagnosis Date   • Anxiety        Past Psychiatric History:  Began Treatment: From primary care, years  Diagnoses: Depression and anxiety  Psychiatrist:Denies  Therapist: Couples therapy for marriage counseling years ago  Admission History:Denies  Medication Trials: See HPI  Self Harm: Denies  Suicide Attempts:Denies   Psychosis, Anxiety, Depression: Denies    Substance Abuse History:   Types: Social alcohol  Withdrawal Symptoms:Denies  Longest Period Sober:Not Applicable   AA: Not applicable     Social History:  Martial Status:  Employed:Yes  Kids:Yes  House:Lives in a house   History: Denies    Social History     Socioeconomic History   • Marital status:    Tobacco Use   • Smoking status: Never Smoker   • Smokeless tobacco: Never Used   Vaping Use   • Vaping Use: Never used   Substance and Sexual Activity   • Alcohol  "use: Yes     Alcohol/week: 6.0 standard drinks     Types: 6 Cans of beer per week   • Drug use: Never   • Sexual activity: Yes       Family History:   Suicide Attempts: Denies  Suicide Completions:Denies      Family History   Problem Relation Age of Onset   • Anxiety disorder Mother    • Diabetes Father    • Anxiety disorder Maternal Grandfather    • Stroke Other        Developmental History:       Childhood: Deferred  High School:Completed  College: Some, he is a .  Applied sciences.    · Mental Status Exam  · Appearance  · : groomed, good eye contact, normal street clothes  · Behavior  · : pleasant and cooperative  · Motor  · : No abnormal  · Speech  · :normal rhythm, rate, volume, tone, not hyperverbal, not pressured, normal prosidy  · Mood  · : \"better\"  · Affect  · : euthymic, mood congruent, good variability  · Thought Content  · : negative suicidal ideations, negative homicidal ideations, negative obsessions  · Perceptions  · : negative auditory hallucinations, negative visual hallucinations  · Thought Process  · : linear  · Insight/Judgement  · : Fair/fair  · Cognition  · : grossly intact  · Attention   : intact      Review of Systems:  Review of Systems   Constitutional: Negative for diaphoresis and fatigue.   HENT: Negative for drooling.    Eyes: Negative for visual disturbance.   Respiratory: Negative for cough and shortness of breath.    Cardiovascular: Negative for chest pain, palpitations and leg swelling.   Gastrointestinal: Negative for nausea and vomiting.   Endocrine: Negative for cold intolerance and heat intolerance.   Genitourinary: Negative for difficulty urinating.   Musculoskeletal: Negative for joint swelling.   Allergic/Immunologic: Negative for immunocompromised state.   Neurological: Negative for dizziness, seizures, speech difficulty and numbness.       Physical Exam:  Physical Exam    Vital Signs:   /81   Ht 170.2 cm (67\")   Wt 105 kg (231 lb)   BMI 36.18 kg/m²      Lab " Results:   No visits with results within 6 Month(s) from this visit.   Latest known visit with results is:   Office Visit on 07/16/2021   Component Date Value Ref Range Status   • Color 07/16/2021 Dark Yellow  Yellow, Straw, Dark Yellow, Teodora Final   • Clarity, UA 07/16/2021 Clear  Clear Final   • Specific Gravity  07/16/2021 1.030  1.005 - 1.030 Final   • pH, Urine 07/16/2021 6.0  5.0 - 8.0 Final   • Leukocytes 07/16/2021 Negative  Negative Final   • Nitrite, UA 07/16/2021 Negative  Negative Final   • Protein, POC 07/16/2021 30 mg/dL (A) Negative mg/dL Final   • Glucose, UA 07/16/2021 Negative  Negative, 1000 mg/dL (3+) mg/dL Final   • Ketones, UA 07/16/2021 Negative  Negative Final   • Urobilinogen, UA 07/16/2021 Normal  Normal Final   • Bilirubin 07/16/2021 Small (1+) (A) Negative Final   • Blood, UA 07/16/2021 Trace (A) Negative Final       EKG Results:  No orders to display       Imaging Results:  No Images in the past 120 days found..      Assessment & Plan   Diagnoses and all orders for this visit:    1. Major depressive disorder, recurrent episode, moderate (HCC) (Primary)  -     escitalopram (Lexapro) 10 MG tablet; Take 1.5 tablets by mouth Daily.  Dispense: 45 tablet; Refill: 2    2. Generalized anxiety disorder  -     escitalopram (Lexapro) 10 MG tablet; Take 1.5 tablets by mouth Daily.  Dispense: 45 tablet; Refill: 2    3. Illness anxiety disorder  -     escitalopram (Lexapro) 10 MG tablet; Take 1.5 tablets by mouth Daily.  Dispense: 45 tablet; Refill: 2    4. Panic attacks  -     escitalopram (Lexapro) 10 MG tablet; Take 1.5 tablets by mouth Daily.  Dispense: 45 tablet; Refill: 2    5. Insomnia due to mental condition  -     escitalopram (Lexapro) 10 MG tablet; Take 1.5 tablets by mouth Daily.  Dispense: 45 tablet; Refill: 2        Visit Diagnoses:    ICD-10-CM ICD-9-CM   1. Major depressive disorder, recurrent episode, moderate (HCC)  F33.1 296.32   2. Generalized anxiety disorder  F41.1 300.02   3.  Illness anxiety disorder  F45.21 300.7   4. Panic attacks  F41.0 300.01   5. Insomnia due to mental condition  F51.05 300.9     327.02     7/8: Increase lexapro. 16 minutes of supportive psychotherapy with goal to strengthen defenses, promote problems solving, restore adaptive functioning and provide symptom relief. The therapeutic alliance was strengthened to encourage the patient to express their thoughts and feelings. Esteem building was enhanced through praise, reassurance, normalizing and encouragement. Coping skills were enhanced to build distress tolerance skills and emotional regulation. Allowed patient to freely discuss issues without interruption or judgement with unconditional positive regard, active listening skills, and empathy. Provided a safe, confidential environment to facilitate the development of a positive therapeutic relationship and encourage open, honest communication. Assisted patient in identifying risk factors which would indicate the need for higher level of care including thoughts to harm self or others and/or self-harming behavior and encouraged patient to contact this office, call 911, or present to the nearest emergency room should any of these events occur. Assisted patient in processing session content; acknowledged and normalized patient’s thoughts, feelings, and concerns by utilizing a person-centered approach in efforts to build appropriate rapport and a positive therapeutic relationship with open and honest communication. Patient given education on medication side effects, diagnosis/illness and relapse symptoms. Plan to continue supportive psychotherapy in next appointment to provide symptom relief.  Diagnoses: as above  Symptoms: as above  Functional status: good  Mental Status Exam: as above    Treatment plan: Medication management and supportive psychotherapy  Prognosis: good  Progress: better  6 wks      5/26: Illness anxiety disorder, possible somatic symptom disorder.  Rule  out OCD.  Residual depression and anxiety as well.  Continue Lexapro and start Ativan as needed.  Referral to therapy.  Consider Luvox or clomipramine for OCD.  Also high dose fluoxetine. 17 minutes of supportive psychotherapy with goal to strengthen defenses, promote problems solving, restore adaptive functioning and provide symptom relief. The therapeutic alliance was strengthened to encourage the patient to express their thoughts and feelings. Esteem building was enhanced through praise, reassurance, normalizing and encouragement. Coping skills were enhanced to build distress tolerance skills and emotional regulation. Allowed patient to freely discuss issues without interruption or judgement with unconditional positive regard, active listening skills, and empathy. Provided a safe, confidential environment to facilitate the development of a positive therapeutic relationship and encourage open, honest communication. Assisted patient in identifying risk factors which would indicate the need for higher level of care including thoughts to harm self or others and/or self-harming behavior and encouraged patient to contact this office, call 911, or present to the nearest emergency room should any of these events occur. Assisted patient in processing session content; acknowledged and normalized patient’s thoughts, feelings, and concerns by utilizing a person-centered approach in efforts to build appropriate rapport and a positive therapeutic relationship with open and honest communication. Patient given education on medication side effects, diagnosis/illness and relapse symptoms. Plan to continue supportive psychotherapy in next appointment to provide symptom relief.  Diagnoses: as above  Symptoms: as above  Functional status: Good  Mental Status Exam: as above    Treatment plan: Medication management and supportive psychotherapy  Prognosis: Good  Progress: First visit  6 weeks    PLAN:  16. Safety: No acute safety  concerns  17. Therapy: Referral Made  18. Risk Assessment: Risk of self-harm acutely is moderate.  Risk factors include anxiety disorder, mood disorder, and recent psychosocial stressors (pandemic). Protective factors include no family history, no access to weapons, no present SI, no history of suicide attempts or self-harm in the past, minimal AODA, healthcare seeking, future orientation, willingness to engage in care.  Risk of self-harm chronically is also moderate, but could be further elevated in the event of treatment noncompliance and/or AODA.  19. Meds:  a. CONTINUE Lexapro 10 mg daily.Risks, benefits, alternatives discussed with patient including GI upset, nausea vomiting diarrhea, theoretical decrease of seizure threshold predisposing the patient to a slightly higher seizure risk, headaches, sexual dysfunction, serotonin syndrome, bleeding risk, increased suicidality in patients 24 years and younger.  After discussion of these risks and benefits, the patient voiced understanding and agreed to proceed.  b. START Ativan 0.5 mg p.o. daily as needed anxiety. Risks, benefits, alternatives discussed with patient including GI upset, sedation, dizziness, respiratory depression, falls risk.  After discussion of these risks and benefits, the patient voiced understanding and agreed to proceed. Moy reviewed. UDS ordered.  CSA signed.  c. Consider adding Wellbutrin if sexual side effects on the Lexapro reemerge.  20. Labs: UDS  21. Follow up: 6 weeks    Patient screened positive for depression based on a PHQ-9 score of  on . Follow-up recommendations include: Prescribed antidepressant medication treatment.           TREATMENT PLAN/GOALS: Continue supportive psychotherapy efforts and medications as indicated. Treatment and medication options discussed during today's visit. Patient acknowledged and verbally consented to continue with current treatment plan and was educated on the importance of compliance with treatment  and follow-up appointments.    MEDICATION ISSUES:  CATHY reviewed as expected.  Discussed medication options and treatment plan of prescribed medication as well as the risks, benefits, and side effects including potential falls, possible impaired driving and metabolic adversities among others. Patient is agreeable to call the office with any worsening of symptoms or onset of side effects. Patient is agreeable to call 911 or go to the nearest ER should he/she begin having SI/HI. No medication side effects or related complaints today.     MEDS ORDERED DURING VISIT:  New Medications Ordered This Visit   Medications   • escitalopram (Lexapro) 10 MG tablet     Sig: Take 1.5 tablets by mouth Daily.     Dispense:  45 tablet     Refill:  2       Return in about 6 weeks (around 8/19/2022).         This document has been electronically signed by José Aamya MD  July 8, 2022 16:11 EDT      Part of this note may be an electronic transcription/translation of spoken language to printed text using the Dragon Dictation System.

## 2022-07-08 NOTE — PATIENT INSTRUCTIONS
1.  Please return to clinic at your next scheduled visit.  Contact the clinic ([phone]) at least 24 hours prior in the event you need to cancel.  2.  Do no harm to yourself or others.    3.  Avoid alcohol and drugs.    4.  Take all medications as prescribed.  Please contact the clinic with any concerns. If you are in need of medication refills, please call the clinic at [phone].    5. Should you want to get in touch with your provider, Dr. José Amaya, please utilize [employer] or contact the office ([phone]), and staff will be able to page Dr. Amaya directly.  6.  In the event you have personal crisis, contact the following crisis numbers: Suicide Prevention Hotline 1-801.685.4501; [facility] Helpline 8-391-238-JQGZ; [facility] Emergency Room [phone]; text HELLO to 006708; or 302.     SPECIFIC RECOMMENDATIONS:     1.      Medications discussed at this encounter:                   - increase lexapro     2.      Psychotherapy recommendations:      3.     Return to clinic: 6 weeks

## 2022-08-25 ENCOUNTER — OFFICE VISIT (OUTPATIENT)
Dept: PSYCHIATRY | Facility: CLINIC | Age: 32
End: 2022-08-25

## 2022-08-25 VITALS
SYSTOLIC BLOOD PRESSURE: 124 MMHG | HEIGHT: 68 IN | WEIGHT: 230 LBS | BODY MASS INDEX: 34.86 KG/M2 | DIASTOLIC BLOOD PRESSURE: 84 MMHG

## 2022-08-25 DIAGNOSIS — F33.1 MAJOR DEPRESSIVE DISORDER, RECURRENT EPISODE, MODERATE: Primary | ICD-10-CM

## 2022-08-25 DIAGNOSIS — F41.0 PANIC ATTACKS: ICD-10-CM

## 2022-08-25 DIAGNOSIS — F45.21 ILLNESS ANXIETY DISORDER: ICD-10-CM

## 2022-08-25 DIAGNOSIS — F51.05 INSOMNIA DUE TO MENTAL CONDITION: ICD-10-CM

## 2022-08-25 DIAGNOSIS — F41.1 GENERALIZED ANXIETY DISORDER: ICD-10-CM

## 2022-08-25 PROCEDURE — 99214 OFFICE O/P EST MOD 30 MIN: CPT | Performed by: STUDENT IN AN ORGANIZED HEALTH CARE EDUCATION/TRAINING PROGRAM

## 2022-08-25 PROCEDURE — 90833 PSYTX W PT W E/M 30 MIN: CPT | Performed by: STUDENT IN AN ORGANIZED HEALTH CARE EDUCATION/TRAINING PROGRAM

## 2022-08-25 RX ORDER — BUPROPION HYDROCHLORIDE 150 MG/1
150 TABLET ORAL EVERY MORNING
Qty: 30 TABLET | Refills: 2 | Status: SHIPPED | OUTPATIENT
Start: 2022-08-25 | End: 2022-12-13 | Stop reason: SINTOL

## 2022-08-25 NOTE — PROGRESS NOTES
"Subjective   Hamlet Kong is a 32 y.o. male who presents today for initial evaluation     Referring Provider:  No referring provider defined for this encounter.    Chief Complaint: Anxiety and depression    History of Present Illness:     5/25: Chart review: Significant anxiety and depression.  Recently seen by primary care April 20.  Dr. Braun started the patient on Pristiq 25 mg a day along with BuSpar 10 twice daily.  Patient tried and failed Trintellix and hydroxyzine.  Also interested in psychotherapy.  Anxiety since at least 2018.  Failed Lexapro and Zoloft, vilazodone, pristiq.  PDMP is empty.  January labs show elevated glucose 179, low sodium 31, chloride 96 low, otherwise reassuring CMP, thyroid studies, lipids, iron studies, some abnormalities on the CBC, but white count, hemoglobin and hematocrit and platelets are within normal limits.  Holter monitor in October 2020 showing periods of sinus tachycardia.  January 2021: Sinus 406.  No head imaging.  Not a lot in Care Everywhere.  May have been restarted on Lexapro 10 mg a day a few days back.    \"Hamlet\"    8/25: In person interview:  1. Chart review: No new.  2. Planning: Increased Lexapro from 10 to 15 mg at the last visit.  3. \"I feel like I'm doing much better.\"  a. Anxiety not as bad  b. Longer periods between flair ups, and flair ups aren't as bad.  c. Used ativan once  d. Sexual dysfunction: hard to orgasm  e. Still has some concentration issues  4. Mood/Depression: 1/10  5. Anxiety: 2 or 3/10  6. Panic attacks: n  7. Energy: good  8. Concentration: stable  9. Sleeping: well  10. Eating: stable  11. Refills: n  12. Substances: n  13. Therapy: starts 9/6  14. Medication compliant:  15. No SI HI AVH.      7/8: In person interview:  16. Chart review: No new.  Sees Daniella for psychotherapy soon.  17. Planning: Started Ativan at last visit to complement Lexapro 10 mg Daily.  Consider increasing Lexapro.  18. \"It's been better.\"  a. Used ativan " "once, helpful.  b. For 2 weeks I felt great, then it came back.  c. Not wanting to do anything \"comes and goes\" for an hour or so  d. Lack of motivation  e. Constant worry about symptoms  f. Tolerating lexapro, no sexual SE.  g. When has intrusive thoughts:  i. A game he plays  ii. Work  iii. podcasts help  iv. Mindfulness education today, also box breathing.  19. Mood/Depression: 1/10, brief episodes of anhedonia  20. Anxiety: 6/10, on edge  21. Therapy: Daniella in September 22. Medication compliant: y  23. No SI HI AVH.      5/26: In person.  Interview:  24. Chart review:   25. His/Her Story: \"I'm kindof a hypochondriac.\"  a. P10, G14  b. Describes obsessions that occur re: medication SE. Compulsions of reading voraciously on them over the internet.  He is always worried that he is sick.  Usually he does not have symptoms.  Sometimes he does though (somatic symptom disorder).  i. The anxiety gets so bad that he sometimes, rarely, has panic attacks.  He was seen in the ER last year in January for a panic attack.  c. Possible OCD.  d. Only stopped lexapro before due to sexual side effects in the past. Has never been on bupropion.  It is working quite well for him now.  \"It has calmed me down and only a week.\"  e. Some residual depressed mood, insomnia, feelings of guilt, poor energy and concentration related to the illness anxiety disorder.  Duration is years.  f. Some anxiety obviously related to the above: Uncontrolled worrying, restlessness, irritability, feeling on edge, insomnia, poor energy and concentration.  \"All that anxiety leaves me exhausted.\"  26. Substances: CBD Gummies he bought over-the-counter.  Has been using them for about a week.  Denies all else except social alcohol.  27. Therapy: Has never tried, interested  28. Medication compliant: Yes  29. Psych ROS: Depression, anxiety, illness anxiety disorder.  Negative for psychosis and jay.  30. No SI HI AVH.    Access to Firearms: Denies    PHQ-9 " Depression Screening  PHQ-9 Total Score:      Little interest or pleasure in doing things?     Feeling down, depressed, or hopeless?     Trouble falling or staying asleep, or sleeping too much?     Feeling tired or having little energy?     Poor appetite or overeating?     Feeling bad about yourself - or that you are a failure or have let yourself or your family down?     Trouble concentrating on things, such as reading the newspaper or watching television?     Moving or speaking so slowly that other people could have noticed? Or the opposite - being so fidgety or restless that you have been moving around a lot more than usual?     Thoughts that you would be better off dead, or of hurting yourself in some way?     PHQ-9 Total Score       CÉSAR-7       Past Surgical History:  Past Surgical History:   Procedure Laterality Date   • CYST REMOVAL  2006   • VASECTOMY      06/2020       Problem List:  Patient Active Problem List   Diagnosis   • Gynecomastia   • Ear pain, bilateral   • Anxiety with depression   • Class 1 obesity       Allergy:   No Known Allergies     Discontinued Medications:  Medications Discontinued During This Encounter   Medication Reason   • Lactobacillus (Florajen Acidophilus) capsule *Therapy completed       Current Medications:   Current Outpatient Medications   Medication Sig Dispense Refill   • escitalopram (Lexapro) 10 MG tablet Take 1.5 tablets by mouth Daily. 45 tablet 2   • LORazepam (Ativan) 0.5 MG tablet Take 1 tablet by mouth Daily As Needed for Anxiety. 15 tablet 2   • buPROPion XL (Wellbutrin XL) 150 MG 24 hr tablet Take 1 tablet by mouth Every Morning. 30 tablet 2     No current facility-administered medications for this visit.       Past Medical History:  Past Medical History:   Diagnosis Date   • Anxiety    • Depression    • Panic disorder        Past Psychiatric History:  Began Treatment: From primary care, years  Diagnoses: Depression and anxiety  Psychiatrist:Ramon  Therapist:  "Couples therapy for marriage counseling years ago  Admission History:Denies  Medication Trials: See HPI  Self Harm: Denies  Suicide Attempts:Denies   Psychosis, Anxiety, Depression: Denies    Substance Abuse History:   Types: Social alcohol  Withdrawal Symptoms:Denies  Longest Period Sober:Not Applicable   AA: Not applicable     Social History:  Martial Status:  Employed:Yes  Kids:Yes  House:Lives in a house   History: Denies    Social History     Socioeconomic History   • Marital status:    Tobacco Use   • Smoking status: Never Smoker   • Smokeless tobacco: Never Used   Vaping Use   • Vaping Use: Never used   Substance and Sexual Activity   • Alcohol use: Yes     Alcohol/week: 6.0 standard drinks     Types: 6 Cans of beer per week   • Drug use: Never   • Sexual activity: Yes     Partners: Female     Birth control/protection: Partner's vasectomy       Family History:   Suicide Attempts: Denies  Suicide Completions:Denies      Family History   Problem Relation Age of Onset   • Anxiety disorder Mother    • Diabetes Father    • Anxiety disorder Maternal Grandfather    • Stroke Other        Developmental History:       Childhood: Deferred  High School:Completed  College: Some, he is a .  Applied sciences.    · Mental Status Exam  · Appearance  · : groomed, good eye contact, normal street clothes  · Behavior  · : pleasant and cooperative  · Motor  · : No abnormal  · Speech  · :normal rhythm, rate, volume, tone, not hyperverbal, not pressured, normal prosidy  · Mood  · : \"even better\"  · Affect  · : euthymic, mood congruent, good variability  · Thought Content  · : negative suicidal ideations, negative homicidal ideations, negative obsessions  · Perceptions  · : negative auditory hallucinations, negative visual hallucinations  · Thought Process  · : linear  · Insight/Judgement  · : Fair/fair  · Cognition  · : grossly intact  · Attention   : intact      Review of Systems:  Review of " "Systems   Constitutional: Negative for diaphoresis, fatigue and fever.   HENT: Negative for drooling.    Eyes: Negative for visual disturbance.   Respiratory: Negative for cough and shortness of breath.    Cardiovascular: Negative for chest pain, palpitations and leg swelling.   Gastrointestinal: Negative for abdominal pain, nausea and vomiting.   Endocrine: Negative for cold intolerance and heat intolerance.   Genitourinary: Negative for difficulty urinating.   Musculoskeletal: Negative for back pain, joint swelling and neck pain.   Allergic/Immunologic: Negative for immunocompromised state.   Neurological: Negative for dizziness, seizures, syncope, speech difficulty, weakness, light-headedness, numbness and headaches.   Psychiatric/Behavioral: Negative for confusion.       Physical Exam:  Physical Exam    Vital Signs:   /84   Ht 172.7 cm (68\")   Wt 104 kg (230 lb)   BMI 34.97 kg/m²      Lab Results:   No visits with results within 6 Month(s) from this visit.   Latest known visit with results is:   Office Visit on 07/16/2021   Component Date Value Ref Range Status   • Color 07/16/2021 Dark Yellow  Yellow, Straw, Dark Yellow, Teodora Final   • Clarity, UA 07/16/2021 Clear  Clear Final   • Specific Gravity  07/16/2021 1.030  1.005 - 1.030 Final   • pH, Urine 07/16/2021 6.0  5.0 - 8.0 Final   • Leukocytes 07/16/2021 Negative  Negative Final   • Nitrite, UA 07/16/2021 Negative  Negative Final   • Protein, POC 07/16/2021 30 mg/dL (A) Negative mg/dL Final   • Glucose, UA 07/16/2021 Negative  Negative, 1000 mg/dL (3+) mg/dL Final   • Ketones, UA 07/16/2021 Negative  Negative Final   • Urobilinogen, UA 07/16/2021 Normal  Normal Final   • Bilirubin 07/16/2021 Small (1+) (A) Negative Final   • Blood, UA 07/16/2021 Trace (A) Negative Final       EKG Results:  No orders to display       Imaging Results:  No Images in the past 120 days found..      Assessment & Plan   Diagnoses and all orders for this visit:    1. Major " depressive disorder, recurrent episode, moderate (HCC) (Primary)  -     buPROPion XL (Wellbutrin XL) 150 MG 24 hr tablet; Take 1 tablet by mouth Every Morning.  Dispense: 30 tablet; Refill: 2    2. Generalized anxiety disorder  -     buPROPion XL (Wellbutrin XL) 150 MG 24 hr tablet; Take 1 tablet by mouth Every Morning.  Dispense: 30 tablet; Refill: 2    3. Illness anxiety disorder  -     buPROPion XL (Wellbutrin XL) 150 MG 24 hr tablet; Take 1 tablet by mouth Every Morning.  Dispense: 30 tablet; Refill: 2    4. Panic attacks  -     buPROPion XL (Wellbutrin XL) 150 MG 24 hr tablet; Take 1 tablet by mouth Every Morning.  Dispense: 30 tablet; Refill: 2    5. Insomnia due to mental condition  -     buPROPion XL (Wellbutrin XL) 150 MG 24 hr tablet; Take 1 tablet by mouth Every Morning.  Dispense: 30 tablet; Refill: 2        Visit Diagnoses:    ICD-10-CM ICD-9-CM   1. Major depressive disorder, recurrent episode, moderate (HCC)  F33.1 296.32   2. Generalized anxiety disorder  F41.1 300.02   3. Illness anxiety disorder  F45.21 300.7   4. Panic attacks  F41.0 300.01   5. Insomnia due to mental condition  F51.05 300.9     327.02     8/25: Start bupropion to target anx and sexual dysfxn. Also concentration issues. 17 minutes of supportive psychotherapy with goal to strengthen defenses, promote problems solving, restore adaptive functioning and provide symptom relief. The therapeutic alliance was strengthened to encourage the patient to express their thoughts and feelings. Esteem building was enhanced through praise, reassurance, normalizing and encouragement. Coping skills were enhanced to build distress tolerance skills and emotional regulation. Allowed patient to freely discuss issues without interruption or judgement with unconditional positive regard, active listening skills, and empathy. Provided a safe, confidential environment to facilitate the development of a positive therapeutic relationship and encourage open,  honest communication. Assisted patient in identifying risk factors which would indicate the need for higher level of care including thoughts to harm self or others and/or self-harming behavior and encouraged patient to contact this office, call 911, or present to the nearest emergency room should any of these events occur. Assisted patient in processing session content; acknowledged and normalized patient’s thoughts, feelings, and concerns by utilizing a person-centered approach in efforts to build appropriate rapport and a positive therapeutic relationship with open and honest communication. Patient given education on medication side effects, diagnosis/illness and relapse symptoms. Plan to continue supportive psychotherapy in next appointment to provide symptom relief.  Diagnoses: as above  Symptoms: as above  Functional status: good  Mental Status Exam: as above    Treatment plan: Medication management and supportive psychotherapy  Prognosis: good  Progress: getting better  6 wks      7/8: Increase lexapro. 16 minutes of supportive psychotherapy   Treatment plan: Medication management and supportive psychotherapy  Prognosis: good  Progress: better  6 wks      5/26: Illness anxiety disorder, possible somatic symptom disorder.  Rule out OCD.  Residual depression and anxiety as well.  Continue Lexapro and start Ativan as needed.  Referral to therapy.  Consider Luvox or clomipramine for OCD.  Also high dose fluoxetine. 17 minutes of supportive psychotherapy   Treatment plan: Medication management and supportive psychotherapy  Prognosis: Good  Progress: First visit  6 weeks    PLAN:  31. Safety: No acute safety concerns  32. Therapy: Referral Made  33. Risk Assessment: Risk of self-harm acutely is moderate.  Risk factors include anxiety disorder, mood disorder, and recent psychosocial stressors (pandemic). Protective factors include no family history, no access to weapons, no present SI, no history of suicide attempts  or self-harm in the past, minimal AODA, healthcare seeking, future orientation, willingness to engage in care.  Risk of self-harm chronically is also moderate, but could be further elevated in the event of treatment noncompliance and/or AODA.  34. Meds:  a. CONTINUE Lexapro 15 to 20 mg daily.Risks, benefits, alternatives discussed with patient including GI upset, nausea vomiting diarrhea, theoretical decrease of seizure threshold predisposing the patient to a slightly higher seizure risk, headaches, sexual dysfunction, serotonin syndrome, bleeding risk, increased suicidality in patients 24 years and younger.  After discussion of these risks and benefits, the patient voiced understanding and agreed to proceed.  b. START bupropion  mg daily. Risks, benefits, alternatives discussed with patient including nausea, GI upset, increased energy, exacerbation of irritability, insomnia, lowering of seizure threshold.  After discussion of these risks and benefits, the patient voiced understanding and agreed to proceed.  c. CONTINUE Ativan 0.5 mg p.o. daily as needed anxiety. Risks, benefits, alternatives discussed with patient including GI upset, sedation, dizziness, respiratory depression, falls risk.  After discussion of these risks and benefits, the patient voiced understanding and agreed to proceed. Moy reviewed. UDS ordered.  CSA signed.  d. Consider adding Wellbutrin if sexual side effects on the Lexapro reemerge.  35. Labs: UDS never got  36. Follow up: 6 weeks    Patient screened positive for depression based on a PHQ-9 score of  on . Follow-up recommendations include: Prescribed antidepressant medication treatment.           TREATMENT PLAN/GOALS: Continue supportive psychotherapy efforts and medications as indicated. Treatment and medication options discussed during today's visit. Patient acknowledged and verbally consented to continue with current treatment plan and was educated on the importance of  compliance with treatment and follow-up appointments.    MEDICATION ISSUES:  CATHY reviewed as expected.  Discussed medication options and treatment plan of prescribed medication as well as the risks, benefits, and side effects including potential falls, possible impaired driving and metabolic adversities among others. Patient is agreeable to call the office with any worsening of symptoms or onset of side effects. Patient is agreeable to call 911 or go to the nearest ER should he/she begin having SI/HI. No medication side effects or related complaints today.     MEDS ORDERED DURING VISIT:  New Medications Ordered This Visit   Medications   • buPROPion XL (Wellbutrin XL) 150 MG 24 hr tablet     Sig: Take 1 tablet by mouth Every Morning.     Dispense:  30 tablet     Refill:  2       Return in about 6 weeks (around 10/6/2022).         This document has been electronically signed by José Amaya MD  August 25, 2022 16:17 EDT       Part of this note may be an electronic transcription/translation of spoken language to printed text using the Dragon Dictation System.

## 2022-08-30 ENCOUNTER — TELEPHONE (OUTPATIENT)
Dept: PSYCHIATRY | Facility: CLINIC | Age: 32
End: 2022-08-30

## 2022-08-30 NOTE — TELEPHONE ENCOUNTER
PT(PATIENT) VERIFIED      CONTACTED PT(PATIENT) PER OVERDUE LAB ORDERS PROTOCOL     PT(PATIENT) HAS OVERDUE LAB ORDERS   Urine Drug Screen - Urine, Clean Catch (2022 09:35)     PT(PATIENT) ADVISED TO GO TO EITHER HOSPITAL OUTPATIENT LAB OR [facility] TO COMPLETE THE ORDER

## 2022-09-06 ENCOUNTER — OFFICE VISIT (OUTPATIENT)
Dept: PSYCHIATRY | Facility: CLINIC | Age: 32
End: 2022-09-06

## 2022-09-06 DIAGNOSIS — F45.21 ILLNESS ANXIETY DISORDER: Primary | ICD-10-CM

## 2022-09-06 PROCEDURE — 90837 PSYTX W PT 60 MINUTES: CPT | Performed by: SOCIAL WORKER

## 2022-09-06 NOTE — PSYCHOTHERAPY NOTE
Psychotherapy Note - 2022    Dr. 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    In middle school (age 10 or 11) - very scared of AIDS  When people touched me, worried about blood    Anxiety before that - random stuff  Symptom of something - fixated on it  googling what it could be    I can't watch movies when people get sick  Hearing news articles or stuff like that  The physical symptoms - worrying about cancer    Anxiety gets low - then I'm okay    Intrusive thought - comes in, causes a lot of stress    DIAGNOSIS  Health anxiety  Somatic symptom disorder    Perfectionist way of thinking  I had a lot of guilt  Wife was apologetic     Some days, I could scroll for at least an hour throughout the day  I went 4 weeks without looking at the internet - googling things    Mad one time - triggered a bad thought - what if you get mad at hurt your kids  Caused so much distress - avoided them - couldn't get mad at them    Mom and dad - raise me  Younger sister - still close with family    Mom and grandfather and sister  Mom will have a symptom of something and she will shut down    Panic attack - 2021 - ER, thought I was having a heart attack  That was the last bad one that I had    [place] check my emotions - I have to be happy all the time to prove that I'm not depressed - if I'm not happy, then what could happen    I can feel sad at a     Cognitive defusion  On a shelf, naming, laughing    Won't stay in the house  My coping skill is not to sit around the house  I'll get up and go out shopping - go do yard  work    Anxiety worse at work - I'm alone all day  No anxiety at night - much calmer    Mindfulness - grounding techniques    When we go on vacation, I have to have everything down to a T  Itinerary for the planned trip    Has never self-harmed or attempted suicide  No family history of SA    To rule out OCD   ERP  Discussed [facility]

## 2022-09-13 ENCOUNTER — TELEPHONE (OUTPATIENT)
Dept: INTERNAL MEDICINE | Facility: CLINIC | Age: 32
End: 2022-09-13

## 2022-09-13 NOTE — TELEPHONE ENCOUNTER
Caller: Hamlet Kong    Relationship: Self    Best call back number: [phone]    What orders are you requesting (i.e. lab or imaging): labs for Testerone testing     In what timeframe would the patient need to come in: asap     Where will you receive your lab/imaging services: lab    Additional notes: please advise patient when added to active labs

## 2022-09-14 ENCOUNTER — TELEPHONE (OUTPATIENT)
Dept: PLASTIC SURGERY | Facility: CLINIC | Age: 32
End: 2022-09-14

## 2022-09-14 ENCOUNTER — LAB (OUTPATIENT)
Dept: LAB | Facility: HOSPITAL | Age: 32
End: 2022-09-14

## 2022-09-14 DIAGNOSIS — Z13.29 THYROID DISORDER SCREEN: ICD-10-CM

## 2022-09-14 DIAGNOSIS — Z13.220 LIPID SCREENING: ICD-10-CM

## 2022-09-14 DIAGNOSIS — F45.21 ILLNESS ANXIETY DISORDER: ICD-10-CM

## 2022-09-14 DIAGNOSIS — Z00.00 ANNUAL PHYSICAL EXAM: ICD-10-CM

## 2022-09-14 LAB
ALBUMIN SERPL-MCNC: 4.1 G/DL (ref 3.5–5.2)
ALBUMIN/GLOB SERPL: 1.5 G/DL
ALP SERPL-CCNC: 75 U/L (ref 39–117)
ALT SERPL W P-5'-P-CCNC: 21 U/L (ref 1–41)
AMPHET+METHAMPHET UR QL: NEGATIVE
ANION GAP SERPL CALCULATED.3IONS-SCNC: 8.6 MMOL/L (ref 5–15)
AST SERPL-CCNC: 21 U/L (ref 1–40)
BARBITURATES UR QL SCN: NEGATIVE
BASOPHILS # BLD AUTO: 0.05 10*3/MM3 (ref 0–0.2)
BASOPHILS NFR BLD AUTO: 1 % (ref 0–1.5)
BENZODIAZ UR QL SCN: NEGATIVE
BILIRUB SERPL-MCNC: 0.5 MG/DL (ref 0–1.2)
BUN SERPL-MCNC: 16 MG/DL (ref 6–20)
BUN/CREAT SERPL: 13.2 (ref 7–25)
CALCIUM SPEC-SCNC: 9.4 MG/DL (ref 8.6–10.5)
CANNABINOIDS SERPL QL: NEGATIVE
CHLORIDE SERPL-SCNC: 104 MMOL/L (ref 98–107)
CHOLEST SERPL-MCNC: 147 MG/DL (ref 0–200)
CO2 SERPL-SCNC: 26.4 MMOL/L (ref 22–29)
COCAINE UR QL: NEGATIVE
CREAT SERPL-MCNC: 1.21 MG/DL (ref 0.76–1.27)
DEPRECATED RDW RBC AUTO: 41.4 FL (ref 37–54)
EGFRCR SERPLBLD CKD-EPI 2021: 80.6 ML/MIN/1.73
EOSINOPHIL # BLD AUTO: 0.27 10*3/MM3 (ref 0–0.4)
EOSINOPHIL NFR BLD AUTO: 5.3 % (ref 0.3–6.2)
ERYTHROCYTE [DISTWIDTH] IN BLOOD BY AUTOMATED COUNT: 11.9 % (ref 12.3–15.4)
GLOBULIN UR ELPH-MCNC: 2.7 GM/DL
GLUCOSE SERPL-MCNC: 83 MG/DL (ref 65–99)
HCT VFR BLD AUTO: 43.4 % (ref 37.5–51)
HDLC SERPL-MCNC: 38 MG/DL (ref 40–60)
HGB BLD-MCNC: 15.4 G/DL (ref 13–17.7)
IMM GRANULOCYTES # BLD AUTO: 0.01 10*3/MM3 (ref 0–0.05)
IMM GRANULOCYTES NFR BLD AUTO: 0.2 % (ref 0–0.5)
LDLC SERPL CALC-MCNC: 96 MG/DL (ref 0–100)
LDLC/HDLC SERPL: 2.52 {RATIO}
LYMPHOCYTES # BLD AUTO: 1.87 10*3/MM3 (ref 0.7–3.1)
LYMPHOCYTES NFR BLD AUTO: 37 % (ref 19.6–45.3)
MCH RBC QN AUTO: 34.1 PG (ref 26.6–33)
MCHC RBC AUTO-ENTMCNC: 35.5 G/DL (ref 31.5–35.7)
MCV RBC AUTO: 96.2 FL (ref 79–97)
METHADONE UR QL SCN: NEGATIVE
MONOCYTES # BLD AUTO: 0.5 10*3/MM3 (ref 0.1–0.9)
MONOCYTES NFR BLD AUTO: 9.9 % (ref 5–12)
NEUTROPHILS NFR BLD AUTO: 2.36 10*3/MM3 (ref 1.7–7)
NEUTROPHILS NFR BLD AUTO: 46.6 % (ref 42.7–76)
NRBC BLD AUTO-RTO: 0 /100 WBC (ref 0–0.2)
OPIATES UR QL: NEGATIVE
OXYCODONE UR QL SCN: NEGATIVE
PLATELET # BLD AUTO: 256 10*3/MM3 (ref 140–450)
PMV BLD AUTO: 9.7 FL (ref 6–12)
POTASSIUM SERPL-SCNC: 5.1 MMOL/L (ref 3.5–5.2)
PROT SERPL-MCNC: 6.8 G/DL (ref 6–8.5)
RBC # BLD AUTO: 4.51 10*6/MM3 (ref 4.14–5.8)
SODIUM SERPL-SCNC: 139 MMOL/L (ref 136–145)
TRIGL SERPL-MCNC: 66 MG/DL (ref 0–150)
TSH SERPL DL<=0.05 MIU/L-ACNC: 1.8 UIU/ML (ref 0.27–4.2)
VLDLC SERPL-MCNC: 13 MG/DL (ref 5–40)
WBC NRBC COR # BLD: 5.06 10*3/MM3 (ref 3.4–10.8)

## 2022-09-14 PROCEDURE — 80307 DRUG TEST PRSMV CHEM ANLYZR: CPT

## 2022-09-14 PROCEDURE — 80050 GENERAL HEALTH PANEL: CPT

## 2022-09-14 PROCEDURE — 80061 LIPID PANEL: CPT

## 2022-09-14 NOTE — TELEPHONE ENCOUNTER
Called patient to see if he was still planning to have surgery on 11/11/22? Patient states he is trying to see if he can take off from work. He will let the office know by end of business day 09/16/22. He may add the cosmetic portion as well and understands that in order to schedule the cosmetic portion a 25% deposit is required and the remaining amount is due 3 weeks prior to surgery at the preop appointment.We do not offer any financial plans, it is all due prior to surgery for cosmetic cases. The insurance portion the patient could possibly set up a payment plan for that portion after surgery.

## 2022-09-18 ENCOUNTER — PATIENT MESSAGE (OUTPATIENT)
Dept: INTERNAL MEDICINE | Facility: CLINIC | Age: 32
End: 2022-09-18

## 2022-09-19 DIAGNOSIS — R53.83 FATIGUE, UNSPECIFIED TYPE: Primary | ICD-10-CM

## 2022-09-19 NOTE — TELEPHONE ENCOUNTER
Called patient, and he is not ready for surgery at this time. Patient aware removed from surgery list and to call the office when he is ready.

## 2022-10-06 ENCOUNTER — LAB (OUTPATIENT)
Dept: LAB | Facility: HOSPITAL | Age: 32
End: 2022-10-06

## 2022-10-06 PROCEDURE — 84402 ASSAY OF FREE TESTOSTERONE: CPT | Performed by: NURSE PRACTITIONER

## 2022-10-06 PROCEDURE — 84403 ASSAY OF TOTAL TESTOSTERONE: CPT | Performed by: NURSE PRACTITIONER

## 2022-10-09 LAB
TESTOST FREE SERPL-MCNC: 8.4 PG/ML (ref 8.7–25.1)
TESTOST SERPL-MCNC: 304 NG/DL (ref 264–916)

## 2022-10-18 NOTE — PROGRESS NOTES
"Progress Note    Date: October 26, 2022  Time In: 1600  Time Out: 1630    Patient Name: Hamlet Kong  Patient Age: 32 y.o.    Mode of visit: Video  Location of provider: [address], [place] [address].  Location of patient: Home    Patient is seen remotely using [employer]. Patient is being seen via telehealth and patient confirms that they are in a secure environment for this session. The patient's condition being diagnosed/treated is appropriate for telemedicine. The provider identified herself as well as her credentials. The patient gave consent to be seen remotely, and when consent is given they understand that the consent allows for patient identifiable information to be sent to a third party as needed. They may refuse to be seen remotely at any time. The electronic data is encrypted and password protected, and the patient has been advised of the potential risks to privacy not withstanding such measures. Patient consented to the use of video for the purpose of a telehealth session today. The visit included audio and video interaction. No technical issues occurred during this visit.      Subjective   History of Present Illness     From previous progress note on 9/6/22:  Patient was educated on grounding techniques and encouraged to practice these daily to improve mood.  Will need to rule out OCD.  Patient could benefit from continued education on recognizing intrusive thoughts and compulsive behaviors.    Hamlet Kong is a 32 y.o. male who presents today as a follow-up for continued psychotherapy.  Patient reports that for the past 6 weeks, he has been mindful about using cognitive defusion techniques in response to his intrusive thoughts.  He states that he has consciously made an effort to avoid compulsive behaviors (\"googling symptoms\") which have helped decrease his intrusive thoughts.  He reports \"things have been great.\"  He reports taking 10 mg of Lexapro daily " "(patient decreased this from 15 mg, because he said he felt \"numb\" on the higher dose).  He was encouraged to discuss this with prescribing provider.      Assessment    Mental Status Exam     Appearance: appeared to have good hygiene  Behavior: calm  Cooperation:  engaged, cooperative, attentive, and friendly  Eye Contact:  good  Affect:  bright  Mood: expressive and happy  Speech: responsive  Thought Process:  linear and organized  Thought Content: appropriate and abstract  Suicidal: denies  Homicidal:  denies  Hallucinations:  denies  Memory:  intact  Orientation:  person, place, time, and situation  Reliability:  reliable  Insight:  good  Judgement:  good     Clinical Intervention       ICD-10-CM ICD-9-CM   1. Generalized anxiety disorder  F41.1 300.02   2. Mild episode of recurrent major depressive disorder (HCC)  F33.0 296.31        Individual psychotherapy was provided utilizing ACT techniques to encourage expression of thoughts and feelings, manage stress, recognize cognitive distortions and provide support.  Patient was educated on the concept of cognitive defusion, and learning to accept thoughts as they come, rather than to change or erase them.  Patient reports that he is looking forward to going to his sister's wedding this weekend, and denies any conflict at home or at work.  He states that he has made significant changes to his thought patterns, which he feels has contributed to an improvement in mood.    Plan   Plan & Goals     Patient is to continue utilizing cognitive defusion techniques, as discussed in session.  He is to monitor his level of anxiety, and we will follow up in a few months to monitor progress.    1. Patient acknowledged and verbally consented to continue working toward resolving current treatment plan goals and was educated on the importance of participation in the therapeutic process.  2. Patient will remain compliant with medication regimen as prescribed. Discuss any medication " side effects, questions or concerns with prescribing provider.  3. Call 911 or present to the nearest emergency room in an emergency situation. National Suicide Prevention Lifeline: 1-651.538.1297.    Return in about 2 months (around 12/26/2022) for Video visit.    ____________________  This document has been electronically signed by Daniella Evans LCSW  October 26, 2022 16:37 EDT    Part of this note may be an electronic transcription/translation of spoken language to printed text using the Dragon Dictation System.

## 2022-10-26 ENCOUNTER — TELEMEDICINE (OUTPATIENT)
Dept: PSYCHIATRY | Facility: CLINIC | Age: 32
End: 2022-10-26

## 2022-10-26 DIAGNOSIS — F33.0 MILD EPISODE OF RECURRENT MAJOR DEPRESSIVE DISORDER: ICD-10-CM

## 2022-10-26 DIAGNOSIS — F41.1 GENERALIZED ANXIETY DISORDER: Primary | ICD-10-CM

## 2022-10-26 PROCEDURE — 90832 PSYTX W PT 30 MINUTES: CPT | Performed by: SOCIAL WORKER

## 2022-10-26 NOTE — PSYCHOTHERAPY NOTE
Psychotherapy Note - 2022    Juanita - 7a-3:30p    Over the last 6 weeks - intrusive thoughts have  down - stopped feeding into them  Intrusive thoughts have  down    I was on 15mg of Lexapro - I dropped back down to 10mg and I feel better  Noticing that it was numbing me - I wasn't sad or anything, just no emotions at all    My wife noticed that I may have been acting different or not like my normal self    Sensitive - probably anxiety related  When my anxiety dies down, my intrusive thoughts go completely away    Obsessive thinking    [place] - [place]   for 11 years (together since  - 11 years)  3 kids (10 - OCD, 6, 2)    Sister's getting  in [facility]

## 2022-11-03 ENCOUNTER — OFFICE VISIT (OUTPATIENT)
Dept: INTERNAL MEDICINE | Facility: CLINIC | Age: 32
End: 2022-11-03

## 2022-11-03 VITALS
BODY MASS INDEX: 37.21 KG/M2 | HEART RATE: 79 BPM | HEIGHT: 68 IN | OXYGEN SATURATION: 98 % | DIASTOLIC BLOOD PRESSURE: 23 MMHG | TEMPERATURE: 97.6 F | SYSTOLIC BLOOD PRESSURE: 118 MMHG | WEIGHT: 245.5 LBS

## 2022-11-03 DIAGNOSIS — F41.8 ANXIETY WITH DEPRESSION: ICD-10-CM

## 2022-11-03 DIAGNOSIS — N62 GYNECOMASTIA: ICD-10-CM

## 2022-11-03 DIAGNOSIS — E66.09 CLASS 2 OBESITY DUE TO EXCESS CALORIES WITHOUT SERIOUS COMORBIDITY WITH BODY MASS INDEX (BMI) OF 37.0 TO 37.9 IN ADULT: Primary | ICD-10-CM

## 2022-11-03 DIAGNOSIS — R06.83 SNORING: ICD-10-CM

## 2022-11-03 DIAGNOSIS — R53.83 FATIGUE, UNSPECIFIED TYPE: ICD-10-CM

## 2022-11-03 PROBLEM — E66.811 CLASS 1 OBESITY: Status: RESOLVED | Noted: 2022-04-21 | Resolved: 2022-11-03

## 2022-11-03 PROBLEM — E66.9 CLASS 1 OBESITY: Status: RESOLVED | Noted: 2022-04-21 | Resolved: 2022-11-03

## 2022-11-03 PROBLEM — E66.812 CLASS 2 OBESITY DUE TO EXCESS CALORIES WITHOUT SERIOUS COMORBIDITY WITH BODY MASS INDEX (BMI) OF 37.0 TO 37.9 IN ADULT: Status: ACTIVE | Noted: 2022-11-03

## 2022-11-03 PROCEDURE — 99214 OFFICE O/P EST MOD 30 MIN: CPT | Performed by: NURSE PRACTITIONER

## 2022-11-03 NOTE — ASSESSMENT & PLAN NOTE
Patient with additional weight gain over the last 6 months, approximately 22 pounds.  Patient has started his medication regimen which is working well for his anxiety, so reluctant to make any significant changes.  We also discussed how all other problems could be feeding into this including possible obstructive sleep apnea, possible low testosterone, these could be feeding into the weight gain as well.  We are going to look into these things, once everything has been fully evaluated and properly diagnosed, discussed with patient that we can talk about weight loss medications like Saxenda or Wegovy.  Encouraged him to go ahead and reach out to his insurance to find out if they cover branded weight loss medications.  We could consider other weight loss medications if not, but did discuss that these would be are safer options if they are covered.  They come with less side effects and less risk.  Currently Wegovy is under a national shortage, so I cannot start him on that anytime soon until we hear of more production.  Patient agrees and understands with this plan.

## 2022-11-03 NOTE — ASSESSMENT & PLAN NOTE
With reports of snoring and patient's level of fatigue, I do feel like it is worthwhile to be evaluated by sleep medicine, he does have risk factors for obstructive sleep apnea and with his symptoms, I do believe it is warranted.  We will follow-up with him after his visit with sleep medicine.

## 2022-11-03 NOTE — PROGRESS NOTES
Chief Complaint  Snoring (/) and Medication Reaction (Side effects: weight gain)    Subjective        Hamlet Kong presents to [facility] for History of Present Illness  Follow-up for weight gain, snoring, fatigue, anxiety, depression.    Patient is here today with primary concern of weight gain, he reports that he has been gaining weight since starting his anxiety depression medication regimen, is feeling like Lexapro may be feeding into his weight gain.  He does have noted weight gain of approximately 22 pounds in the last 6 months.  Patient expresses lack of motivation to exercise, lack of time to exercise, does not feel like he eats and overabundance of food regularly, sometimes he does over indulge, but not on a regular basis.  He does have gynecomastia, he has talked with plastic surgery here locally, they were discussing surgery, he has opted to get a second opinion by a plastic surgeon and level, that is upcoming in December.  Patient also has follow-up with his psychiatrist, Dr. Amaya, in December.  He is not happy with his current weight, but he is happy with his current management of his anxiety depression, stating that he feels much better than he did several months ago.    Patient also concerned with snoring, he states that his wife has noted that his snoring is excessive, possible apneic periods while he is sleeping.  He reports excessive fatigue throughout the day, stating it is very easy to just sit back and take a nap during the day.  He does not elicit that he feels like his sleep is bad, but he does notice the excessive fatigue.  We have been looking at his testosterone, his first testosterone was checked a few weeks ago, it was normal, in the low range.  We discussed getting additional testosterone checks to trend this better.    Otherwise, patient reports that things are going well.  No other significant concerns or complaints today.       Objective   Vital  "Signs:   BP (!) 118/23   Pulse 79   Temp 97.6 °F (36.4 °C)   Ht 172.7 cm (68\")   Wt 111 kg (245 lb 8 oz)   SpO2 98%   BMI 37.33 kg/m²     Physical Exam  Vitals and nursing note reviewed.   Constitutional:       Appearance: Normal appearance. He is obese.   HENT:      Head: Normocephalic and atraumatic.   Pulmonary:      Effort: Pulmonary effort is normal.   Neurological:      Mental Status: He is alert.   Psychiatric:         Mood and Affect: Mood normal.         Thought Content: Thought content normal.         Judgment: Judgment normal.        Result Review :  {The following data was reviewed by LUCILA Aguilar on 11/03/22                Diagnoses and all orders for this visit:    1. Class 2 obesity due to excess calories without serious comorbidity with body mass index (BMI) of 37.0 to 37.9 in adult (Primary)  Assessment & Plan:  Patient with additional weight gain over the last 6 months, approximately 22 pounds.  Patient has started his medication regimen which is working well for his anxiety, so reluctant to make any significant changes.  We also discussed how all other problems could be feeding into this including possible obstructive sleep apnea, possible low testosterone, these could be feeding into the weight gain as well.  We are going to look into these things, once everything has been fully evaluated and properly diagnosed, discussed with patient that we can talk about weight loss medications like Saxenda or Wegovy.  Encouraged him to go ahead and reach out to his insurance to find out if they cover branded weight loss medications.  We could consider other weight loss medications if not, but did discuss that these would be are safer options if they are covered.  They come with less side effects and less risk.  Currently Wegovy is under a national shortage, so I cannot start him on that anytime soon until we hear of more production.  Patient agrees and understands with this plan.      2. " Gynecomastia  Assessment & Plan:  Patient is getting second opinion from plastic surgery in December.        3. Anxiety with depression  Assessment & Plan:  Patient reports that his symptoms are much better since establishing with behavioral health, he has been working with therapist and with Dr. Amaya, reporting good compliance with medication.  Patient is feeling much better, his only concern is weight gain since starting his medication regimen.  There has been approximately 22 pound weight gain in the last 6 months.  I did encourage patient to discuss this with Dr. Amaya at his next visit in December, see if he has any recommendations when it comes to his medication.  We are also working up for possible obstructive sleep apnea which could be contributing to his fatigue and lack of energy throughout the day.  If this can be improved, it may lead to weight loss with more energy to sustain exercise program.  We are also looking into his testosterone levels to ensure that those are within normal limits.  We will also discuss weight loss medications after he has these visits completed.  Patient is also getting evaluated by plastic surgery for gynecomastia, which will help with some minimal weight loss.      4. Fatigue, unspecified type  Assessment & Plan:  For fatigue, I recommended we go ahead and do 2 more serial checks for testosterone as this has been concerning for him.  I discussed having this drawn between 8 AM and 10 AM in the mornings, he works Monday through Friday, so ideally he will have this done at the hospital on a Saturday or Sunday morning.  I will follow-up once both of those have been resulted.  We did discuss that his first testosterone was normal, on the low side, but normal.  We discussed how weight can affect testosterone levels, and with his recent weight gain, he may be on the lower end of the spectrum.  We also discussed that in regards to testosterone levels, we would focus on weight first  before trialing testosterone replacement.    Orders:  -     Testosterone, Free, Total; Future  -     Testosterone, Free, Total; Future  -     Ambulatory Referral to Sleep Medicine    5. Snoring  Assessment & Plan:  With reports of snoring and patient's level of fatigue, I do feel like it is worthwhile to be evaluated by sleep medicine, he does have risk factors for obstructive sleep apnea and with his symptoms, I do believe it is warranted.  We will follow-up with him after his visit with sleep medicine.    Orders:  -     Ambulatory Referral to Sleep Medicine        Follow Up   Return in about 3 months (around 2/3/2023) for Recheck.  Patient was given instructions and counseling regarding his condition or for health maintenance advice. Please see specific information pulled into the AVS if appropriate.     LUCILA Aguilar  11/3/2022  This note was electronically signed.

## 2022-11-03 NOTE — ASSESSMENT & PLAN NOTE
For fatigue, I recommended we go ahead and do 2 more serial checks for testosterone as this has been concerning for him.  I discussed having this drawn between 8 AM and 10 AM in the mornings, he works Monday through Friday, so ideally he will have this done at the hospital on a Saturday or Sunday morning.  I will follow-up once both of those have been resulted.  We did discuss that his first testosterone was normal, on the low side, but normal.  We discussed how weight can affect testosterone levels, and with his recent weight gain, he may be on the lower end of the spectrum.  We also discussed that in regards to testosterone levels, we would focus on weight first before trialing testosterone replacement.

## 2022-11-03 NOTE — ASSESSMENT & PLAN NOTE
Patient reports that his symptoms are much better since establishing with behavioral health, he has been working with therapist and with Dr. Amaya, reporting good compliance with medication.  Patient is feeling much better, his only concern is weight gain since starting his medication regimen.  There has been approximately 22 pound weight gain in the last 6 months.  I did encourage patient to discuss this with Dr. Amaya at his next visit in December, see if he has any recommendations when it comes to his medication.  We are also working up for possible obstructive sleep apnea which could be contributing to his fatigue and lack of energy throughout the day.  If this can be improved, it may lead to weight loss with more energy to sustain exercise program.  We are also looking into his testosterone levels to ensure that those are within normal limits.  We will also discuss weight loss medications after he has these visits completed.  Patient is also getting evaluated by plastic surgery for gynecomastia, which will help with some minimal weight loss.

## 2022-11-04 ENCOUNTER — TELEPHONE (OUTPATIENT)
Dept: PSYCHIATRY | Facility: CLINIC | Age: 32
End: 2022-11-04

## 2022-11-04 DIAGNOSIS — F51.05 INSOMNIA DUE TO MENTAL CONDITION: ICD-10-CM

## 2022-11-04 DIAGNOSIS — F45.21 ILLNESS ANXIETY DISORDER: ICD-10-CM

## 2022-11-04 DIAGNOSIS — F41.1 GENERALIZED ANXIETY DISORDER: ICD-10-CM

## 2022-11-04 DIAGNOSIS — F41.0 PANIC ATTACKS: ICD-10-CM

## 2022-11-04 DIAGNOSIS — F33.1 MAJOR DEPRESSIVE DISORDER, RECURRENT EPISODE, MODERATE: ICD-10-CM

## 2022-11-04 RX ORDER — ESCITALOPRAM OXALATE 10 MG/1
TABLET ORAL
Qty: 45 TABLET | Refills: 2 | Status: SHIPPED | OUTPATIENT
Start: 2022-11-04 | End: 2023-02-01

## 2022-12-13 ENCOUNTER — OFFICE VISIT (OUTPATIENT)
Dept: PSYCHIATRY | Facility: CLINIC | Age: 32
End: 2022-12-13

## 2022-12-13 VITALS
HEIGHT: 68 IN | SYSTOLIC BLOOD PRESSURE: 116 MMHG | HEART RATE: 79 BPM | DIASTOLIC BLOOD PRESSURE: 69 MMHG | BODY MASS INDEX: 37.53 KG/M2 | WEIGHT: 247.6 LBS

## 2022-12-13 DIAGNOSIS — F41.0 PANIC ATTACKS: ICD-10-CM

## 2022-12-13 DIAGNOSIS — F45.21 ILLNESS ANXIETY DISORDER: ICD-10-CM

## 2022-12-13 DIAGNOSIS — F41.1 GENERALIZED ANXIETY DISORDER: ICD-10-CM

## 2022-12-13 DIAGNOSIS — F33.1 MAJOR DEPRESSIVE DISORDER, RECURRENT EPISODE, MODERATE: Primary | ICD-10-CM

## 2022-12-13 DIAGNOSIS — F51.05 INSOMNIA DUE TO MENTAL CONDITION: ICD-10-CM

## 2022-12-13 PROCEDURE — 90833 PSYTX W PT W E/M 30 MIN: CPT | Performed by: STUDENT IN AN ORGANIZED HEALTH CARE EDUCATION/TRAINING PROGRAM

## 2022-12-13 PROCEDURE — 99214 OFFICE O/P EST MOD 30 MIN: CPT | Performed by: STUDENT IN AN ORGANIZED HEALTH CARE EDUCATION/TRAINING PROGRAM

## 2022-12-13 RX ORDER — BUSPIRONE HYDROCHLORIDE 10 MG/1
10 TABLET ORAL 2 TIMES DAILY
Qty: 60 TABLET | Refills: 2 | Status: SHIPPED | OUTPATIENT
Start: 2022-12-13 | End: 2023-02-13 | Stop reason: SDUPTHER

## 2022-12-13 NOTE — PATIENT INSTRUCTIONS
1.  Please return to clinic at your next scheduled visit.  Contact the clinic ([phone]) at least 24 hours prior in the event you need to cancel.  2.  Do no harm to yourself or others.    3.  Avoid alcohol and drugs.    4.  Take all medications as prescribed.  Please contact the clinic with any concerns. If you are in need of medication refills, please call the clinic at [phone].    5. Should you want to get in touch with your provider, Dr. José Amaya, please utilize [employer] or contact the office ([phone]), and staff will be able to page Dr. Amaya directly.  6.  In the event you have personal crisis, contact the following crisis numbers: Suicide Prevention Hotline 1-196.321.7082; [facility] Helpline 7-566-565-YJYJ; [facility] Emergency Room [phone]; text HELLO to 834968; or 534.     SPECIFIC RECOMMENDATIONS:     1.      Medications discussed at this encounter:                   - stop bupropion and start buspar     2.      Psychotherapy recommendations:      3.     Return to clinic: 2 mos

## 2022-12-13 NOTE — PROGRESS NOTES
"Subjective   Hamlet Kong is a 32 y.o. male who presents today for initial evaluation     Referring Provider:  Rah Valdivia, LUCILA  [address] [facility]  [address]  [place] [address]    Chief Complaint: Anxiety and depression    History of Present Illness:     5/25: Chart review: Significant anxiety and depression.  Recently seen by primary care April 20.  Dr. Braun started the patient on Pristiq 25 mg a day along with BuSpar 10 twice daily.  Patient tried and failed Trintellix and hydroxyzine.  Also interested in psychotherapy.  Anxiety since at least 2018.  Failed Lexapro, Zoloft, vilazodone, pristiq.  PDMP is empty.  January labs show elevated glucose 179, low sodium 31, chloride 96 low, otherwise reassuring CMP, thyroid studies, lipids, iron studies, some abnormalities on the CBC, but white count, hemoglobin and hematocrit and platelets are within normal limits.  Holter monitor in October 2020 showing periods of sinus tachycardia.  January 2021: Sinus 406.  No head imaging.  Not a lot in Care Everywhere.  May have been restarted on Lexapro 10 mg a day a few days back.    \"Hamlet\"  Same-day canceled 10/20    12/13: In person interview:  1. Chart review: Continuing psychotherapy.  Seen by primary care in November for weight gain and snoring.  Patient and primary care feel that his weight gain is potentially due to his psychiatric medications, however weight gain with Wellbutrin is unlikely, as well with Lexapro.  Referred for sleep study.  2. Planning: Started Wellbutrin at the last visit to target anxiety and sexual dysfunction.  Could switch Lexapro to a different medication, like Trintellix.  Did he really fail any of the above medications?  Especially considering that he is doing well on Lexapro and Wellbutrin.  Could also discontinue Lexapro and only keep him on Wellbutrin, increasing the Wellbutrin.  3. \"Doing pretty good.\"  a. \"Pretty much back to normal\"  b. Sexual dysfunction is \"better... taken care " "of.\"  c. WG of 20 lbs since 8/22  i. Was on lexapro before May; weight was stable from July to August.  ii. Started bupropion in August -- if anything is the culprit, it is wellbutrin. This is all the more odd because it is an appetite suppressant, but it can happen.  iii. That being said, the wg may not even be attributable wellbutrin, but some other confounding factor  d. Also notes fatigue which he also attributes to low testosterone; wg not associated with low testosterone  e. Food diary advised  4. Mood/Depression: not depressed  5. Anxiety:  Not worrying  6. Panic attacks: n  7. Energy: good  8. Concentration: good  9. Sleeping: well, but snoring a lot  10. Eating: wg  11. Refills: y  12. Substances: n  13. Therapy: [facility]  14. Medication compliant: y  15. No SI HI AVH.      8/25: In person interview:  16. Chart review: No new.  17. Planning: Increased Lexapro from 10 to 15 mg at the last visit.  18. \"I feel like I'm doing much better.\"  a. Anxiety not as bad  b. Longer periods between flair ups, and flair ups aren't as bad.  c. Used ativan once  d. Sexual dysfunction: hard to orgasm  e. Still has some concentration issues  19. Mood/Depression: 1/10  20. Anxiety: 2 or 3/10  21. Panic attacks: n  22. Energy: good  23. Concentration: stable  24. Sleeping: well  25. Eating: stable  26. Refills: n  27. Substances: n  28. Therapy: starts 9/6  29. Medication compliant:  30. No SI HI AVH.      7/8: In person interview:  31. Chart review: No new.  Sees [facility] for psychotherapy soon.  32. Planning: Started Ativan at last visit to complement Lexapro 10 mg Daily.  Consider increasing Lexapro.  33. \"It's been better.\"  a. Used ativan once, helpful.  b. For 2 weeks I felt great, then it came back.  c. Not wanting to do anything \"comes and goes\" for an hour or so  d. Lack of motivation  e. Constant worry about symptoms  f. Tolerating lexapro, no sexual SE.  g. When has intrusive thoughts:  i. A game he " "plays  ii. Work  iii. podcasts help  iv. Mindfulness education today, also box breathing.  34. Mood/Depression: 1/10, brief episodes of anhedonia  35. Anxiety: 6/10, on edge  36. Therapy: [facility] in September 37. Medication compliant: y  38. No SI HI AVH.      5/26: In person.  Interview:  39. Chart review:   40. His/Her Story: \"I'm kindof a hypochondriac.\"  a. P10, G14  b. Describes obsessions that occur re: medication SE. Compulsions of reading voraciously on them over the internet.  He is always worried that he is sick.  Usually he does not have symptoms.  Sometimes he does though (somatic symptom disorder).  i. The anxiety gets so bad that he sometimes, rarely, has panic attacks.  He was seen in the ER last year in January for a panic attack.  c. Possible OCD.  d. Only stopped lexapro before due to sexual side effects in the past. Has never been on bupropion.  It is working quite well for him now.  \"It has calmed me down and only a week.\"  e. Some residual depressed mood, insomnia, feelings of guilt, poor energy and concentration related to the illness anxiety disorder.  Duration is years.  f. Some anxiety obviously related to the above: Uncontrolled worrying, restlessness, irritability, feeling on edge, insomnia, poor energy and concentration.  \"All that anxiety leaves me exhausted.\"  41. Substances: CBD Gummies he bought over-the-counter.  Has been using them for about a week.  Denies all else except social alcohol.  42. Therapy: Has never tried, interested  43. Medication compliant: Yes  44. Psych ROS: Depression, anxiety, illness anxiety disorder.  Negative for psychosis and jay.  45. No SI HI AVH.    Access to Firearms: Denies    PHQ-9 Depression Screening  PHQ-9 Total Score:      Little interest or pleasure in doing things?     Feeling down, depressed, or hopeless?     Trouble falling or staying asleep, or sleeping too much?     Feeling tired or having little energy?     Poor appetite or overeating?   "   Feeling bad about yourself - or that you are a failure or have let yourself or your family down?     Trouble concentrating on things, such as reading the newspaper or watching television?     Moving or speaking so slowly that other people could have noticed? Or the opposite - being so fidgety or restless that you have been moving around a lot more than usual?     Thoughts that you would be better off dead, or of hurting yourself in some way?     PHQ-9 Total Score       CÉSAR-7       Past Surgical History:  Past Surgical History:   Procedure Laterality Date   • CYST REMOVAL     • VASECTOMY      2020       Problem List:  Patient Active Problem List   Diagnosis   • Gynecomastia   • Ear pain, bilateral   • Anxiety with depression   • Class 2 obesity due to excess calories without serious comorbidity with body mass index (BMI) of 37.0 to 37.9 in adult   • Fatigue   • Snoring       Allergy:   No Known Allergies     Discontinued Medications:  Medications Discontinued During This Encounter   Medication Reason   • buPROPion XL (Wellbutrin XL) 150 MG 24 hr tablet Side effects       Current Medications:   Current Outpatient Medications   Medication Sig Dispense Refill   • escitalopram (LEXAPRO) 10 MG tablet TAKE 1 AND 1/2 TABLETS BY MOUTH DAILY 45 tablet 2   • LORazepam (Ativan) 0.5 MG tablet Take 1 tablet by mouth Daily As Needed for Anxiety. 15 tablet 2   • busPIRone (BUSPAR) 10 MG tablet Take 1 tablet by mouth 2 (Two) Times a Day. 60 tablet 2     No current facility-administered medications for this visit.       Past Medical History:  Past Medical History:   Diagnosis Date   • Anxiety    • Depression    • Panic disorder        Past Psychiatric History:  Began Treatment: From primary care, years  Diagnoses: Depression and anxiety  Psychiatrist:[facility]  Therapist: Couples therapy for marriage counseling years ago  Admission History:Denies  Medication Trials: See HPI  Self Harm: Denies  Suicide Attempts:Denies    "Psychosis, Anxiety, Depression: Denies    Substance Abuse History:   Types: Social alcohol  Withdrawal Symptoms:Denies  Longest Period Sober:Not Applicable   AA: Not applicable     Social History:  Martial Status:  Employed:Yes  Kids:Yes  House:Lives in a house   History: Denies    Social History     Socioeconomic History   • Marital status:    Tobacco Use   • Smoking status: Never   • Smokeless tobacco: Never   Vaping Use   • Vaping Use: Never used   Substance and Sexual Activity   • Alcohol use: Yes     Alcohol/week: 6.0 standard drinks     Types: 6 Cans of beer per week   • Drug use: Never   • Sexual activity: Yes     Partners: Female     Birth control/protection: Vasectomy       Family History:   Suicide Attempts: Denies  Suicide Completions:Denies      Family History   Problem Relation Age of Onset   • Anxiety disorder Mother    • Diabetes Father    • Anxiety disorder Maternal Grandfather    • Stroke Other        Developmental History:       Childhood: Deferred  High School:Completed  College: Some, he is a .  Applied sciences.    · Mental Status Exam  · Appearance  · : groomed, good eye contact, normal street clothes  · Behavior  · : pleasant and cooperative  · Motor  · : No abnormal  · Speech  · :normal rhythm, rate, volume, tone, not hyperverbal, not pressured, normal prosidy  · Mood  · : \"back to normal\"  · Affect  · : euthymic, mood congruent, good variability  · Thought Content  · : negative suicidal ideations, negative homicidal ideations, negative obsessions  · Perceptions  · : negative auditory hallucinations, negative visual hallucinations  · Thought Process  · : linear  · Insight/Judgement  · : Fair/fair  · Cognition  · : grossly intact  · Attention   : intact      Review of Systems:  Review of Systems   Constitutional: Negative for diaphoresis, fatigue and fever.   HENT: Negative for drooling.    Eyes: Negative for visual disturbance.   Respiratory: Negative for cough " "and shortness of breath.    Cardiovascular: Negative for chest pain, palpitations and leg swelling.   Gastrointestinal: Negative for abdominal pain, nausea and vomiting.   Endocrine: Negative for cold intolerance and heat intolerance.   Genitourinary: Negative for difficulty urinating.   Musculoskeletal: Negative for back pain, joint swelling and neck pain.   Allergic/Immunologic: Negative for immunocompromised state.   Neurological: Negative for dizziness, seizures, syncope, speech difficulty, weakness, light-headedness, numbness and headaches.   Psychiatric/Behavioral: Negative for confusion.       Physical Exam:  Physical Exam    Vital Signs:   /69   Pulse 79   Ht 172.7 cm (68\")   Wt 112 kg (247 lb 9.6 oz)   BMI 37.65 kg/m²      Lab Results:   Orders Only on 09/19/2022   Component Date Value Ref Range Status   • Testosterone, Total 10/06/2022 304  264 - 916 ng/dL Final    Adult male reference interval is based on a population of  healthy nonobese males (BMI <30) between 19 and 39 years old.  Mally et.al. JCEM 2017,102;7393-4753. PMID: [id number].   • Testosterone, Free 10/06/2022 8.4 (L)  8.7 - 25.1 pg/mL Final   Lab on 09/14/2022   Component Date Value Ref Range Status   • Glucose 09/14/2022 83  65 - 99 mg/dL Final   • BUN 09/14/2022 16  6 - 20 mg/dL Final   • Creatinine 09/14/2022 1.21  0.76 - 1.27 mg/dL Final   • Sodium 09/14/2022 139  136 - 145 mmol/L Final   • Potassium 09/14/2022 5.1  3.5 - 5.2 mmol/L Final   • Chloride 09/14/2022 104  98 - 107 mmol/L Final   • CO2 09/14/2022 26.4  22.0 - 29.0 mmol/L Final   • Calcium 09/14/2022 9.4  8.6 - 10.5 mg/dL Final   • Total Protein 09/14/2022 6.8  6.0 - 8.5 g/dL Final   • Albumin 09/14/2022 4.10  3.50 - 5.20 g/dL Final   • ALT (SGPT) 09/14/2022 21  1 - 41 U/L Final   • AST (SGOT) 09/14/2022 21  1 - 40 U/L Final   • Alkaline Phosphatase 09/14/2022 75  39 - 117 U/L Final   • Total Bilirubin 09/14/2022 0.5  0.0 - 1.2 mg/dL Final   • Globulin 09/14/2022 2.7 "  gm/dL Final   • A/G Ratio 09/14/2022 1.5  g/dL Final   • BUN/Creatinine Ratio 09/14/2022 13.2  7.0 - 25.0 Final   • Anion Gap 09/14/2022 8.6  5.0 - 15.0 mmol/L Final   • eGFR 09/14/2022 80.6  >60.0 mL/min/1.73 Final    National Kidney Foundation and American Society of Nephrology (ASN) Task Force recommended calculation based on the Chronic Kidney Disease Epidemiology Collaboration (CKD-EPI) equation refit without adjustment for race.   • TSH 09/14/2022 1.800  0.270 - 4.200 uIU/mL Final   • Total Cholesterol 09/14/2022 147  0 - 200 mg/dL Final   • Triglycerides 09/14/2022 66  0 - 150 mg/dL Final   • HDL Cholesterol 09/14/2022 38 (L)  40 - 60 mg/dL Final   • LDL Cholesterol  09/14/2022 96  0 - 100 mg/dL Final   • VLDL Cholesterol 09/14/2022 13  5 - 40 mg/dL Final   • LDL/HDL Ratio 09/14/2022 2.52   Final   • Amphet/Methamphet, Screen 09/14/2022 Negative  Negative Final   • Barbiturates Screen, Urine 09/14/2022 Negative  Negative Final   • Benzodiazepine Screen, Urine 09/14/2022 Negative  Negative Final   • Cocaine Screen, Urine 09/14/2022 Negative  Negative Final   • Opiate Screen 09/14/2022 Negative  Negative Final   • THC, Screen, Urine 09/14/2022 Negative  Negative Final   • Methadone Screen, Urine 09/14/2022 Negative  Negative Final   • Oxycodone Screen, Urine 09/14/2022 Negative  Negative Final   • WBC 09/14/2022 5.06  3.40 - 10.80 10*3/mm3 Final   • RBC 09/14/2022 4.51  4.14 - 5.80 10*6/mm3 Final   • Hemoglobin 09/14/2022 15.4  13.0 - 17.7 g/dL Final   • Hematocrit 09/14/2022 43.4  37.5 - 51.0 % Final   • MCV 09/14/2022 96.2  79.0 - 97.0 fL Final   • MCH 09/14/2022 34.1 (H)  26.6 - 33.0 pg Final   • MCHC 09/14/2022 35.5  31.5 - 35.7 g/dL Final   • RDW 09/14/2022 11.9 (L)  12.3 - 15.4 % Final   • RDW-SD 09/14/2022 41.4  37.0 - 54.0 fl Final   • MPV 09/14/2022 9.7  6.0 - 12.0 fL Final   • Platelets 09/14/2022 256  140 - 450 10*3/mm3 Final   • Neutrophil % 09/14/2022 46.6  42.7 - 76.0 % Final   • Lymphocyte %  09/14/2022 37.0  19.6 - 45.3 % Final   • Monocyte % 09/14/2022 9.9  5.0 - 12.0 % Final   • Eosinophil % 09/14/2022 5.3  0.3 - 6.2 % Final   • Basophil % 09/14/2022 1.0  0.0 - 1.5 % Final   • Immature Grans % 09/14/2022 0.2  0.0 - 0.5 % Final   • Neutrophils, Absolute 09/14/2022 2.36  1.70 - 7.00 10*3/mm3 Final   • Lymphocytes, Absolute 09/14/2022 1.87  0.70 - 3.10 10*3/mm3 Final   • Monocytes, Absolute 09/14/2022 0.50  0.10 - 0.90 10*3/mm3 Final   • Eosinophils, Absolute 09/14/2022 0.27  0.00 - 0.40 10*3/mm3 Final   • Basophils, Absolute 09/14/2022 0.05  0.00 - 0.20 10*3/mm3 Final   • Immature Grans, Absolute 09/14/2022 0.01  0.00 - 0.05 10*3/mm3 Final   • nRBC 09/14/2022 0.0  0.0 - 0.2 /100 WBC Final       EKG Results:  No orders to display       Imaging Results:  No Images in the past 120 days found..      Assessment & Plan   Diagnoses and all orders for this visit:    1. Major depressive disorder, recurrent episode, moderate (HCC) (Primary)  -     busPIRone (BUSPAR) 10 MG tablet; Take 1 tablet by mouth 2 (Two) Times a Day.  Dispense: 60 tablet; Refill: 2    2. Generalized anxiety disorder  -     busPIRone (BUSPAR) 10 MG tablet; Take 1 tablet by mouth 2 (Two) Times a Day.  Dispense: 60 tablet; Refill: 2    3. Illness anxiety disorder  -     busPIRone (BUSPAR) 10 MG tablet; Take 1 tablet by mouth 2 (Two) Times a Day.  Dispense: 60 tablet; Refill: 2    4. Panic attacks  -     busPIRone (BUSPAR) 10 MG tablet; Take 1 tablet by mouth 2 (Two) Times a Day.  Dispense: 60 tablet; Refill: 2    5. Insomnia due to mental condition        Visit Diagnoses:    ICD-10-CM ICD-9-CM   1. Major depressive disorder, recurrent episode, moderate (HCC)  F33.1 296.32   2. Generalized anxiety disorder  F41.1 300.02   3. Illness anxiety disorder  F45.21 300.7   4. Panic attacks  F41.0 300.01   5. Insomnia due to mental condition  F51.05 300.9     327.02     12/13: Weight gain, possibly attributable to Wellbutrin, although it is somewhat  difficult to say.  Given the timing, it is unlikely Lexapro.  Discontinue Wellbutrin and start BuSpar and monitor weight.  Patient will also keep a food diary.  Other causes should be considered as well. 17 minutes of supportive psychotherapy with goal to strengthen defenses, promote problems solving, restore adaptive functioning and provide symptom relief. The therapeutic alliance was strengthened to encourage the patient to express their thoughts and feelings. Esteem building was enhanced through praise, reassurance, normalizing and encouragement. Coping skills were enhanced to build distress tolerance skills and emotional regulation. Allowed patient to freely discuss issues without interruption or judgement with unconditional positive regard, active listening skills, and empathy. Provided a safe, confidential environment to facilitate the development of a positive therapeutic relationship and encourage open, honest communication. Assisted patient in identifying risk factors which would indicate the need for higher level of care including thoughts to harm self or others and/or self-harming behavior and encouraged patient to contact this office, call 911, or present to the nearest emergency room should any of these events occur. Assisted patient in processing session content; acknowledged and normalized patient’s thoughts, feelings, and concerns by utilizing a person-centered approach in efforts to build appropriate rapport and a positive therapeutic relationship with open and honest communication. Patient given education on medication side effects, diagnosis/illness and relapse symptoms. Plan to continue supportive psychotherapy in next appointment to provide symptom relief.  Diagnoses: as above  Symptoms: as above  Functional status: good  Mental Status Exam: as above    Treatment plan: Medication management and supportive psychotherapy  Prognosis: good  Progress: improved  2 mos      8/25: Start bupropion to  target anx and sexual dysfxn. Also concentration issues. 17 minutes of supportive psychotherapy  Treatment plan: Medication management and supportive psychotherapy  Prognosis: good  Progress: getting better  6 wks      7/8: Increase lexapro. 16 minutes of supportive psychotherapy   Treatment plan: Medication management and supportive psychotherapy  Prognosis: good  Progress: better  6 wks      5/26: Illness anxiety disorder, possible somatic symptom disorder.  Rule out OCD.  Residual depression and anxiety as well.  Continue Lexapro and start Ativan as needed.  Referral to therapy.  Consider Luvox or clomipramine for OCD.  Also high dose fluoxetine. 17 minutes of supportive psychotherapy   Treatment plan: Medication management and supportive psychotherapy  Prognosis: Good  Progress: First visit  6 weeks    PLAN:  46. Safety: No acute safety concerns  47. Therapy: Referral Made  48. Risk Assessment: Risk of self-harm acutely is moderate.  Risk factors include anxiety disorder, mood disorder, and recent psychosocial stressors (pandemic). Protective factors include no family history, no access to weapons, no present SI, no history of suicide attempts or self-harm in the past, minimal AODA, healthcare seeking, future orientation, willingness to engage in care.  Risk of self-harm chronically is also moderate, but could be further elevated in the event of treatment noncompliance and/or AODA.  49. Meds:  a. CONTINUE Lexapro 20 mg daily.Risks, benefits, alternatives discussed with patient including GI upset, nausea vomiting diarrhea, theoretical decrease of seizure threshold predisposing the patient to a slightly higher seizure risk, headaches, sexual dysfunction, serotonin syndrome, bleeding risk, increased suicidality in patients 24 years and younger.  After discussion of these risks and benefits, the patient voiced understanding and agreed to proceed.  b. STOP bupropion  mg daily. WG? Risks, benefits, alternatives  discussed with patient including nausea, GI upset, increased energy, exacerbation of irritability, insomnia, lowering of seizure threshold.  After discussion of these risks and benefits, the patient voiced understanding and agreed to proceed.  c. START buspar 10 mg po bid. Risks, benefits, alternatives discussed with patient including nausea, GI upset, mild sedation, falls risk.  After discussion of these risks and benefits, the patient voiced understanding and agreed to proceed.  d. CONTINUE Ativan 0.5 mg p.o. daily as needed anxiety. USES RARELY. Risks, benefits, alternatives discussed with patient including GI upset, sedation, dizziness, respiratory depression, falls risk.  After discussion of these risks and benefits, the patient voiced understanding and agreed to proceed. Cathy reviewed. UDS ordered.  CSA signed.  50. Labs: UDS never got  51. Follow up: 2 mos    Patient screened positive for depression based on a PHQ-9 score of  on . Follow-up recommendations include: Prescribed antidepressant medication treatment.           TREATMENT PLAN/GOALS: Continue supportive psychotherapy efforts and medications as indicated. Treatment and medication options discussed during today's visit. Patient acknowledged and verbally consented to continue with current treatment plan and was educated on the importance of compliance with treatment and follow-up appointments.    MEDICATION ISSUES:  CATHY reviewed as expected.  Discussed medication options and treatment plan of prescribed medication as well as the risks, benefits, and side effects including potential falls, possible impaired driving and metabolic adversities among others. Patient is agreeable to call the office with any worsening of symptoms or onset of side effects. Patient is agreeable to call 911 or go to the nearest ER should he/she begin having SI/HI. No medication side effects or related complaints today.     MEDS ORDERED DURING VISIT:  New Medications Ordered  This Visit   Medications   • busPIRone (BUSPAR) 10 MG tablet     Sig: Take 1 tablet by mouth 2 (Two) Times a Day.     Dispense:  60 tablet     Refill:  2       Return in about 2 months (around 2/13/2023).         This document has been electronically signed by José Amaya MD  December 13, 2022 16:17 EST       Part of this note may be an electronic transcription/translation of spoken language to printed text using the Dragon Dictation System.

## 2023-01-05 NOTE — PROGRESS NOTES
Progress Note    Date: January 10, 2023  Time In: 0758  Time Out: 0886    Patient Name: Hamlet Kong  Patient Age: 32 y.o.      Subjective   History of Present Illness     From previous progress note on 10/26/22:  Patient is to continue utilizing cognitive defusion techniques, as discussed in session.  He is to monitor his level of anxiety, and we will follow up in a few months to monitor progress.    Hamlet Kong is a 32 y.o. male who presents today as a follow-up for continued psychotherapy.  Patient reports that he continues to struggle with intrusive thoughts that are disturbing and distressing throughout his day.  However, he states that he is been challenging himself to accept the thoughts rather than trying to change or replace them.  He states that he has been making efforts to not engage in compulsive acts to relieve his distress.  Overall, he states that his anxiety has been managed to some extent, but he continues to worry that he \"might be suicidal\" especially after seeing the word 'suicide' or watching tv shows or news reports about this topic.      Assessment    Mental Status Exam     Appearance: good hygiene and dressed appropriately for the weather  Behavior: calm  Cooperation:  engaged, cooperative, attentive, and friendly  Eye Contact:  good  Affect:  congruent  Mood: anxious  Speech: responsive  Thought Process:  linear and organized  Thought Content: intrusive thoughts  Suicidal: denies  Homicidal:  denies  Hallucinations:  denies  Memory:  intact  Orientation:  person, place, time, and situation  Reliability:  reliable  Insight:  good  Judgement:  good     Clinical Intervention       ICD-10-CM ICD-9-CM   1. Mixed obsessional thoughts and acts  F42.2 300.3   2. Major depressive disorder, recurrent episode, moderate (HCC)  F33.1 296.32        Individual psychotherapy was provided utilizing ERP techniques to encourage expression of thoughts and feelings, assess symptoms, gather  history, review diagnostic criteria, provide support and establish therapeutic alliance. Patient participated in part of the Y-BOCS assessment to determine severity of OCD symptoms.  Therapist provided psychoeducation on the differences between obsessions and compulsions and briefly discussed ERP as a potential treatment option.     Plan   Plan & Goals     Continue Y-BOCS assessment next session to determine severity score and we will continue to discuss ERP as a modality for treatment if applicable.    1. Patient acknowledged and verbally consented to continue working toward resolving current treatment plan goals and was educated on the importance of participation in the therapeutic process.  2. Patient will remain compliant with medication regimen as prescribed. Discuss any medication side effects, questions or concerns with prescribing provider.  3. Call 911 or present to the nearest emergency room in an emergency situation. National Suicide Prevention Lifeline: 1-154.427.5591.    Return in about 2 weeks (around 1/24/2023) for Next scheduled follow up.    ____________________  This document has been electronically signed by Daniella Evans LCSW  January 10, 2023 12:48 EST    Part of this note may be an electronic transcription/translation of spoken language to printed text using the Dragon Dictation System.

## 2023-01-10 ENCOUNTER — OFFICE VISIT (OUTPATIENT)
Dept: PSYCHIATRY | Facility: CLINIC | Age: 33
End: 2023-01-10
Payer: COMMERCIAL

## 2023-01-10 DIAGNOSIS — F42.2 MIXED OBSESSIONAL THOUGHTS AND ACTS: Primary | ICD-10-CM

## 2023-01-10 DIAGNOSIS — F33.1 MAJOR DEPRESSIVE DISORDER, RECURRENT EPISODE, MODERATE: ICD-10-CM

## 2023-01-10 PROCEDURE — 90837 PSYTX W PT 60 MINUTES: CPT | Performed by: SOCIAL WORKER

## 2023-01-10 NOTE — PSYCHOTHERAPY NOTE
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

## 2023-01-12 ENCOUNTER — OFFICE VISIT (OUTPATIENT)
Dept: SLEEP MEDICINE | Facility: HOSPITAL | Age: 33
End: 2023-01-12
Payer: COMMERCIAL

## 2023-01-12 VITALS
OXYGEN SATURATION: 96 % | DIASTOLIC BLOOD PRESSURE: 71 MMHG | BODY MASS INDEX: 37.66 KG/M2 | HEIGHT: 68 IN | WEIGHT: 248.5 LBS | HEART RATE: 79 BPM | SYSTOLIC BLOOD PRESSURE: 115 MMHG

## 2023-01-12 DIAGNOSIS — G47.33 OSA (OBSTRUCTIVE SLEEP APNEA): Primary | ICD-10-CM

## 2023-01-12 DIAGNOSIS — R40.0 DAYTIME SLEEPINESS: ICD-10-CM

## 2023-01-12 DIAGNOSIS — R06.83 SNORING: ICD-10-CM

## 2023-01-12 PROCEDURE — G0463 HOSPITAL OUTPT CLINIC VISIT: HCPCS | Performed by: INTERNAL MEDICINE

## 2023-01-12 RX ORDER — CETIRIZINE HYDROCHLORIDE 10 MG/1
CAPSULE, LIQUID FILLED ORAL AS NEEDED
COMMUNITY

## 2023-01-12 RX ORDER — SERTRALINE HYDROCHLORIDE 25 MG/1
TABLET, FILM COATED ORAL EVERY 24 HOURS
COMMUNITY
End: 2023-02-01

## 2023-01-12 RX ORDER — FLUTICASONE PROPIONATE 50 MCG
SPRAY, SUSPENSION (ML) NASAL AS NEEDED
COMMUNITY

## 2023-01-12 NOTE — PROGRESS NOTES
"CONSULT NOTE    Patient Identification:  Hamlet Kong  32 y.o.  male  1990  [id number]            Requesting physician: Mily Braun    Reason for Consultation: Hypersomnia snoring    CC:     History of Present Illness:  Very pleasant 32-year-old gentleman states wife reports snoring with increased arousals at night.  Patient feels unrested in the morning and sleepy and tired as the day progresses.  Weight gain reported.  No heavy alcohol use no parasomnia such as sleepwalking sleep talking or restless leg symptoms reported.  Also reports somewhat sleepy with driving.  Goes to bed 9 gets up at 545 gets about 8 hours of sleep and feels tired.  Reports frequent awakenings at night.  Currently only takes Lexapro 10 mg daily for anxiety.      Review of Systems  Positive for fatigue anxiety ear pain.  Rest of the 12 point review of system negative  [place] 13 out of 24 consistent with sleepiness  Past Medical History:  Past Medical History:   Diagnosis Date   • Anxiety    • Depression    • Panic disorder        Past Surgical History:  Past Surgical History:   Procedure Laterality Date   • CYST REMOVAL  2006   • VASECTOMY      06/2020        Home Meds:  (Not in a hospital admission)      Allergies:  No Known Allergies    Social History:   Social History     Socioeconomic History   • Marital status:    Tobacco Use   • Smoking status: Never   • Smokeless tobacco: Never   Vaping Use   • Vaping Use: Never used   Substance and Sexual Activity   • Alcohol use: Yes     Alcohol/week: 6.0 standard drinks     Types: 6 Cans of beer per week   • Drug use: Never   • Sexual activity: Yes     Partners: Female     Birth control/protection: Vasectomy       Family History:  Family History   Problem Relation Age of Onset   • Anxiety disorder Mother    • Diabetes Father    • Anxiety disorder Maternal Grandfather    • Stroke Other        Physical Exam:  /71   Pulse 79   Ht 172.7 cm (68\")   Wt 113 kg (248 lb 8 oz) "   SpO2 96%   BMI 37.78 kg/m²  Body mass index is 37.78 kg/m². 96% 113 kg (248 lb 8 oz)  Physical Exam  Patient is examined using the personal protective equipment as per guidelines from infection control for this particular patient as enacted.  Hand hygiene was performed before and after patient interaction.  Well-developed normal body habitus  Eyes normal conjunctivae and pupils reactive to light  ENT Mallampati between 3 and 4 normal nasal exam  Neck midline trachea no thyromegaly  Chest no labored breathing clear  CVS regular rate and rhythm no lower extremity edema  Abdomen soft nontender no hepatosplenomegaly  CNS intact normal sensory exam  Skin no rashes no nodules  Psych oriented to time place and person normal memory  Musculoskeletal no cyanosis no clubbing normal range of motion        LABS:  Lab Results   Component Value Date    CALCIUM 9.4 09/14/2022       Lab Results   Component Value Date    TROPONINT <0.01 01/17/2021                                 Lab Results   Component Value Date    TSH 1.800 09/14/2022     CrCl cannot be calculated (Patient's most recent lab result is older than the maximum 30 days allowed.).         Imaging: I personally visualized the images of scans/x-rays performed within last 3 days.      Assessment:  Snoring  Hypersomnia  Allergic rhinitis        Recommendations:  At this time we have a gentleman with clinical symptoms and airway anatomy with obesity and symptoms suggestive of sleep disordered breathing.  Discussed pathophysiology consequence of untreated sleep apnea.  Patient agreeable wishing to proceed for home sleep study.  Correlation between BRET and current comorbidities and symptoms are discussed in detail  Treatment of allergies to continue  Correlation between BRET and current symptoms was also discussed  Weight loss encouraged  Sleep hygiene measures discussed and to be followed  We will follow-up post home sleep study to make further  recommendations              Ran Humphrey MD  1/12/2023  09:56 EST      Much of this encounter note is an electronic transcription/translation of spoken language to printed text using Dragon Software.

## 2023-01-19 NOTE — PROGRESS NOTES
Progress Note    Date: January 24, 2023  Time In: 1516  Time Out: 1554    Patient Name: Hamlet Kong  Patient Age: 32 y.o.    Mode of visit: Audio  Location of provider: [address], [place] [address]  Location of patient: Car     Patient is being seen via telehealth (through [facility]) and patient confirms that they are in a secure environment for this session. The patient's condition being diagnosed/treated is appropriate for telemedicine. The provider identified herself as well as her credentials. The patient gave consent to be seen remotely, and when consent is given they understand that the consent allows for patient identifiable information to be sent to a third party as needed. They may refuse to be seen remotely at any time. The electronic data is encrypted and password protected, and the patient has been advised of the potential risks to privacy not withstanding such measures. Patient consented to the use of audio for the purpose of a telehealth session today. The visit included audio interaction only due to patient being unable to connect to video.       Subjective   History of Present Illness     From previous progress note on 1/10/23:  Continue Y-BOCS assessment next session to determine severity score and we will continue to discuss ERP as a modality for treatment if applicable.    CHIEF COMPLAINT: OCD, DEPRESSION    Hamlet Kong is a 32 y.o. male who presents today as a follow-up for continued psychotherapy.  Patient reports that he continues to challenge compulsive tendencies in response to intrusive, disturbing thoughts, and he states that overall he feels that his symptoms are continuing to improve.  Patient states that he is becoming more aware of his thoughts and making the effort to relieve his symptoms by not ritualizing.      Assessment    Mental Status Exam     Appearance: unable to assess  Behavior: calm  Cooperation:  engaged, cooperative,  "attentive, and friendly  Eye Contact:  Unable to assess  Affect:  congruent  Mood: anxious  Speech: responsive  Thought Process:  linear and organized  Thought Content: intrusive thoughts  Suicidal: denies  Homicidal:  denies  Hallucinations:  denies  Memory:  intact  Orientation:  person, place, time, and situation  Reliability:  reliable  Insight:  good  Judgement:  good     Clinical Intervention       ICD-10-CM ICD-9-CM   1. Mixed obsessional thoughts and acts  F42.2 300.3   2. Mild episode of recurrent major depressive disorder (HCC)  F33.0 296.31        Individual psychotherapy was provided utilizing ERP & CBT techniques to provide symptom relief, manage stress, acknowledge sources of feelings and behaviors, challenge negative thinking patterns and provide support.  Patient participated in the final part of the Y-BOCS assessment and scored a 13/40 on severity, indicating that his symptoms are mild.  Patient reports \"4 months ago, they would have been extreme and debilitating.\"  He reflected on cognitive exercises that he has been using to improve his symptoms, and states that he is motivated to continue with ERP, to practice exposures, and to reduce his symptoms of OCD even further.  He began developing functional analysis of each of his obsessions, while preparing for hierarchy construction.    Plan   Plan & Goals     We will plan to begin developing hierarchy for potential exposures in the next session.  Patient is to continue challenging his compulsive tendencies and bringing awareness to obsessive, intrusive thoughts, as discussed in session.    1. Patient acknowledged and verbally consented to continue working toward resolving current treatment plan goals and was educated on the importance of participation in the therapeutic process.  2. Patient will remain compliant with medication regimen as prescribed. Discuss any medication side effects, questions or concerns with prescribing provider.  3. Call 911 or " present to the nearest emergency room in an emergency situation.  4. National Suicide Prevention Lifeline: Call 988. The Lifeline provides 24/7, free and confidential support for people in distress, prevention and crisis resources.    Return in about 2 weeks (around 2/7/2023) for Next scheduled follow up.    ____________________  This document has been electronically signed by Daniella Evans LCSW  January 24, 2023 16:00 EST    Part of this note may be an electronic transcription/translation of spoken language to printed text using the Dragon Dictation System.

## 2023-01-23 ENCOUNTER — OFFICE VISIT (OUTPATIENT)
Dept: INTERNAL MEDICINE | Facility: CLINIC | Age: 33
End: 2023-01-23
Payer: COMMERCIAL

## 2023-01-23 VITALS
SYSTOLIC BLOOD PRESSURE: 110 MMHG | WEIGHT: 248 LBS | TEMPERATURE: 96.5 F | BODY MASS INDEX: 37.71 KG/M2 | DIASTOLIC BLOOD PRESSURE: 60 MMHG | HEART RATE: 82 BPM | OXYGEN SATURATION: 95 %

## 2023-01-23 DIAGNOSIS — B96.89 ACUTE BACTERIAL RHINOSINUSITIS: Primary | ICD-10-CM

## 2023-01-23 DIAGNOSIS — J01.90 ACUTE BACTERIAL RHINOSINUSITIS: Primary | ICD-10-CM

## 2023-01-23 PROCEDURE — 99213 OFFICE O/P EST LOW 20 MIN: CPT | Performed by: NURSE PRACTITIONER

## 2023-01-23 RX ORDER — AMOXICILLIN AND CLAVULANATE POTASSIUM 875; 125 MG/1; MG/1
1 TABLET, FILM COATED ORAL 2 TIMES DAILY
Qty: 20 TABLET | Refills: 0 | Status: SHIPPED | OUTPATIENT
Start: 2023-01-23 | End: 2023-02-02

## 2023-01-23 NOTE — PROGRESS NOTES
Chief Complaint  Sore Throat (For about 3 weeks, no fever) and Nasal Congestion    Subjective        Hamlet Kong presents to [facility] for History of Present Illness  Concerns for sore throat, congestion, drainage.  Patient reports that over the last 2 to 3 weeks he has been having sore throat, congestion, drainage.  It is progressively got worse.  He is use some over-the-counter remedies, without any success.  Primary concern is his throat today, feels irritated.  No other symptoms reported.       Objective   Vital Signs:   /60 (BP Location: Left arm, Patient Position: Sitting, Cuff Size: Large Adult)   Pulse 82   Temp 96.5 °F (35.8 °C) (Temporal)   Wt 112 kg (248 lb)   SpO2 95%   BMI 37.71 kg/m²     Physical Exam  Vitals and nursing note reviewed.   Constitutional:       Appearance: Normal appearance.   HENT:      Head: Normocephalic and atraumatic.      Right Ear: Tympanic membrane, ear canal and external ear normal.      Left Ear: Tympanic membrane, ear canal and external ear normal.      Nose: Congestion and rhinorrhea present.      Mouth/Throat:      Mouth: Mucous membranes are moist.      Pharynx: Oropharynx is clear.   Eyes:      Conjunctiva/sclera: Conjunctivae normal.      Pupils: Pupils are equal, round, and reactive to light.   Cardiovascular:      Rate and Rhythm: Normal rate and regular rhythm.   Pulmonary:      Effort: Pulmonary effort is normal.      Breath sounds: Normal breath sounds.   Neurological:      Mental Status: He is alert.        Result Review :  {The following data was reviewed by LUCILA Aguilar on 01/23/23                Diagnoses and all orders for this visit:    1. Acute bacterial rhinosinusitis (Primary)    Other orders  -     amoxicillin-clavulanate (Augmentin) 875-125 MG per tablet; Take 1 tablet by mouth 2 (Two) Times a Day for 10 days.  Dispense: 20 tablet; Refill: 0    Due to length of symptoms and severity, we will go ahead  and send in Augmentin.  Discussed over-the-counter medications he can use to help with symptoms including his Zyrtec and Flonase, he can use Tylenol or Motrin, warm salt water gargles or a Chloraseptic spray.  Follow-up if symptoms worsen or persist.  Would expect resolution of symptoms over the next 3 to 5 days, and would make sure to complete all doses of antibiotics until gone.      Follow Up   No follow-ups on file.  Patient was given instructions and counseling regarding his condition or for health maintenance advice. Please see specific information pulled into the AVS if appropriate.     Rah Valdivia, APRN  1/23/2023  This note was electronically signed.

## 2023-01-24 ENCOUNTER — TELEMEDICINE (OUTPATIENT)
Dept: PSYCHIATRY | Facility: CLINIC | Age: 33
End: 2023-01-24
Payer: COMMERCIAL

## 2023-01-24 DIAGNOSIS — F33.0 MILD EPISODE OF RECURRENT MAJOR DEPRESSIVE DISORDER: ICD-10-CM

## 2023-01-24 DIAGNOSIS — F42.2 MIXED OBSESSIONAL THOUGHTS AND ACTS: Primary | ICD-10-CM

## 2023-01-24 PROCEDURE — 90834 PSYTX W PT 45 MINUTES: CPT | Performed by: SOCIAL WORKER

## 2023-01-24 NOTE — PSYCHOTHERAPY NOTE
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 Am I going to hurt my kids? Am I going to hurt myself?  COMPULSION - mental ritual, reassurance \"I would never do that\"    DISEASE PARK  TRIGGER -   OBSESSION - Do I have AIDS? Do I have cancer? Do I have depression?  COMPULSION - mental ritual, reassurance                "

## 2023-02-01 DIAGNOSIS — F51.05 INSOMNIA DUE TO MENTAL CONDITION: ICD-10-CM

## 2023-02-01 DIAGNOSIS — F41.1 GENERALIZED ANXIETY DISORDER: ICD-10-CM

## 2023-02-01 DIAGNOSIS — F33.1 MAJOR DEPRESSIVE DISORDER, RECURRENT EPISODE, MODERATE: ICD-10-CM

## 2023-02-01 DIAGNOSIS — F41.0 PANIC ATTACKS: ICD-10-CM

## 2023-02-01 DIAGNOSIS — F45.21 ILLNESS ANXIETY DISORDER: ICD-10-CM

## 2023-02-01 RX ORDER — ESCITALOPRAM OXALATE 20 MG/1
20 TABLET ORAL DAILY
Qty: 90 TABLET | Refills: 1 | Status: SHIPPED | OUTPATIENT
Start: 2023-02-01

## 2023-02-07 ENCOUNTER — OFFICE VISIT (OUTPATIENT)
Dept: PSYCHIATRY | Facility: CLINIC | Age: 33
End: 2023-02-07
Payer: COMMERCIAL

## 2023-02-07 DIAGNOSIS — F41.1 GENERALIZED ANXIETY DISORDER: ICD-10-CM

## 2023-02-07 DIAGNOSIS — F42.2 MIXED OBSESSIONAL THOUGHTS AND ACTS: Primary | ICD-10-CM

## 2023-02-07 PROCEDURE — 90837 PSYTX W PT 60 MINUTES: CPT | Performed by: SOCIAL WORKER

## 2023-02-07 NOTE — PSYCHOTHERAPY NOTE
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 Am I going to hurt my kids? Am I going to hurt myself?  COMPULSION - mental ritual, reassurance \"I would never do that\"     DISEASE PARK  TRIGGER - movies about any disease (or depression) - I lock on to the symptoms of the characters - if they have a symptom, I have to make sure I don't have that symptom  OBSESSION - Do I have depression?  COMPULSION - mental ritual, reassurance    MORALITY PARK  TRIGGER - argument or disagreement with wife (turns into anxiety), I don't argue - I become very passive, I won't talk a lot  OBSESSION - Am I doing the right thing? I'll never do anything right  COMPULSION - sit by myself and be quiet, relax, calm everything, recap the conversation with her  FEARED CONSEQUENCE - wife leaving, taking kids, splitting custody - alone (more distress, trying to avoid it)    I don't like confrontation at all - I don't like to bring up anything confrontational  I don't like making people - fear abandonment (worse after wife cheated in 2020)  ________________    10 - son - Jamel - (jealousy of 7 year old)  7 - daughter (jealousy of 2 year old)  2 - daughter (can play well with 10 year " old)  _________________    Feeling like I make mistakes    Narrative about letting wife down (50 or lower)  Conversation with wife that I did something wrong (100)  Wife leaves me to do housework and I don't always do it (60-70)  Disciplining Jamel the way I want to (90)  Sharing feelings with wife (70)  Arguing with anyone, especially with my wife (100)    We discipline our kids on 2 different levels (I like grounding more than spanking)  If I don't discipline in a way that she wants (she's tough love)    In my mind, it will become    **sharing opinion & arguments  I don't remember speaking my mind when I was younger  If I feel any anger building up, it stresses me out (I am my own worst critic)  Sitting with emotions & irritable & even my snaps I feel like are too much    Wearing a mask

## 2023-02-13 ENCOUNTER — OFFICE VISIT (OUTPATIENT)
Dept: PSYCHIATRY | Facility: CLINIC | Age: 33
End: 2023-02-13
Payer: COMMERCIAL

## 2023-02-13 VITALS
DIASTOLIC BLOOD PRESSURE: 82 MMHG | HEIGHT: 68 IN | WEIGHT: 244.2 LBS | BODY MASS INDEX: 37.01 KG/M2 | SYSTOLIC BLOOD PRESSURE: 137 MMHG | HEART RATE: 63 BPM

## 2023-02-13 DIAGNOSIS — F41.0 PANIC ATTACKS: ICD-10-CM

## 2023-02-13 DIAGNOSIS — F51.05 INSOMNIA DUE TO MENTAL CONDITION: ICD-10-CM

## 2023-02-13 DIAGNOSIS — F41.1 GENERALIZED ANXIETY DISORDER: ICD-10-CM

## 2023-02-13 DIAGNOSIS — F42.2 MIXED OBSESSIONAL THOUGHTS AND ACTS: Primary | ICD-10-CM

## 2023-02-13 DIAGNOSIS — F45.21 ILLNESS ANXIETY DISORDER: ICD-10-CM

## 2023-02-13 DIAGNOSIS — F33.1 MAJOR DEPRESSIVE DISORDER, RECURRENT EPISODE, MODERATE: ICD-10-CM

## 2023-02-13 PROCEDURE — 99214 OFFICE O/P EST MOD 30 MIN: CPT | Performed by: STUDENT IN AN ORGANIZED HEALTH CARE EDUCATION/TRAINING PROGRAM

## 2023-02-13 PROCEDURE — 90833 PSYTX W PT W E/M 30 MIN: CPT | Performed by: STUDENT IN AN ORGANIZED HEALTH CARE EDUCATION/TRAINING PROGRAM

## 2023-02-13 RX ORDER — BUSPIRONE HYDROCHLORIDE 10 MG/1
10 TABLET ORAL 2 TIMES DAILY
Qty: 180 TABLET | Refills: 1 | Status: SHIPPED | OUTPATIENT
Start: 2023-02-13

## 2023-02-13 NOTE — PROGRESS NOTES
"Subjective   Hamlet Kong is a 32 y.o. male who presents today for initial evaluation     Referring Provider:  Rah Valdivia, LUCILA  [address] [facility]  [address]  [place] [address]    Chief Complaint: Anxiety and depression    History of Present Illness:     5/25: Chart review: Significant anxiety and depression.  Recently seen by primary care April 20.  Dr. Braun started the patient on Pristiq 25 mg a day along with BuSpar 10 twice daily.  Patient tried and failed Trintellix and hydroxyzine.  Also interested in psychotherapy.  Anxiety since at least 2018.  Failed Lexapro, Zoloft, vilazodone, pristiq.  PDMP is empty.  January labs show elevated glucose 179, low sodium 31, chloride 96 low, otherwise reassuring CMP, thyroid studies, lipids, iron studies, some abnormalities on the CBC, but white count, hemoglobin and hematocrit and platelets are within normal limits.  Holter monitor in October 2020 showing periods of sinus tachycardia.  January 2021: Sinus 406.  No head imaging.  Not a lot in Care Everywhere.  May have been restarted on Lexapro 10 mg a day a few days back.    \"Hamlet\"  Same-day canceled 10/20    2/13: In person interview:  1. Chart review: Seen by psychotherapy, Daniella, 3 times, sleep medicine, internal medicine.  a. Worried about her blood.  Contamination.  b. He will be getting a home sleep study for hypersomnia.  c. Acute bacterial sinusitis, started on Augmentin.  2. Planning: Weight gain, possibly attributable to Wellbutrin, although it is somewhat difficult to say.  Given the timing, it is unlikely Lexapro.  Discontinue Wellbutrin and start BuSpar and monitor weight.  Patient will also keep a food diary.  Other causes should be considered as well.   3. \"I've lost some weight, been on a diet.\"  a. I've been doing pretty good.  4. Mood/Depression: under control  5. Anxiety: under control  6. Panic attacks: n  7. Energy: still tired, daytime sleepiness  8. Concentration: good  9. Sleeping: " "well  10. Eatin lb wl   a. Cut down on carbs  11. Refills: y  12. Substances: n  13. Therapy: Daniella, that's helping  14. Medication compliant: y  15. No SI HI AVH.      : In person interview:  16. Chart review: Continuing psychotherapy.  Seen by primary care in November for weight gain and snoring.  Patient and primary care feel that his weight gain is potentially due to his psychiatric medications, however weight gain with Wellbutrin is unlikely, as well with Lexapro.  Referred for sleep study.  17. Planning: Started Wellbutrin at the last visit to target anxiety and sexual dysfunction.  Could switch Lexapro to a different medication, like Trintellix.  Did he really fail any of the above medications?  Especially considering that he is doing well on Lexapro and Wellbutrin.  Could also discontinue Lexapro and only keep him on Wellbutrin, increasing the Wellbutrin.  18. \"Doing pretty good.\"  a. \"Pretty much back to normal\"  b. Sexual dysfunction is \"better... taken care of.\"  c. WG of 20 lbs since   i. Was on lexapro before May; weight was stable from July to August.  ii. Started bupropion in August -- if anything is the culprit, it is wellbutrin. This is all the more odd because it is an appetite suppressant, but it can happen.  iii. That being said, the wg may not even be attributable wellbutrin, but some other confounding factor  d. Also notes fatigue which he also attributes to low testosterone; wg not associated with low testosterone  e. Food diary advised  19. Mood/Depression: not depressed  20. Anxiety:  Not worrying  21. Panic attacks: n  22. Energy: good  23. Concentration: good  24. Sleeping: well, but snoring a lot  25. Eating: wg  26. Refills: y  27. Substances: n  28. Therapy: Daniella  29. Medication compliant: y  30. No SI HI AVH.      : In person interview:  31. Chart review: No new.  32. Planning: Increased Lexapro from 10 to 15 mg at the last visit.  33. \"I feel like I'm doing " "much better.\"  a. Anxiety not as bad  b. Longer periods between flair ups, and flair ups aren't as bad.  c. Used ativan once  d. Sexual dysfunction: hard to orgasm  e. Still has some concentration issues  34. Mood/Depression: 1/10  35. Anxiety: 2 or 3/10  36. Panic attacks: n  37. Energy: good  38. Concentration: stable  39. Sleeping: well  40. Eating: stable  41. Refills: n  42. Substances: n  43. Therapy: starts 9/6  44. Medication compliant:  45. No SI HI AVH.      7/8: In person interview:  46. Chart review: No new.  Sees Daniella for psychotherapy soon.  47. Planning: Started Ativan at last visit to complement Lexapro 10 mg Daily.  Consider increasing Lexapro.  48. \"It's been better.\"  a. Used ativan once, helpful.  b. For 2 weeks I felt great, then it came back.  c. Not wanting to do anything \"comes and goes\" for an hour or so  d. Lack of motivation  e. Constant worry about symptoms  f. Tolerating lexapro, no sexual SE.  g. When has intrusive thoughts:  i. A game he plays  ii. Work  iii. podcasts help  iv. Mindfulness education today, also box breathing.  49. Mood/Depression: 1/10, brief episodes of anhedonia  50. Anxiety: 6/10, on edge  51. Therapy: Daniella in September 52. Medication compliant: y  53. No SI HI AVH.      5/26: In person.  Interview:  54. Chart review:   55. His/Her Story: \"I'm kindof a hypochondriac.\"  a. P10, G14  b. Describes obsessions that occur re: medication SE. Compulsions of reading voraciously on them over the internet.  He is always worried that he is sick.  Usually he does not have symptoms.  Sometimes he does though (somatic symptom disorder).  i. The anxiety gets so bad that he sometimes, rarely, has panic attacks.  He was seen in the ER last year in January for a panic attack.  c. Possible OCD.  d. Only stopped lexapro before due to sexual side effects in the past. Has never been on bupropion.  It is working quite well for him now.  \"It has calmed me down and only a " "week.\"  e. Some residual depressed mood, insomnia, feelings of guilt, poor energy and concentration related to the illness anxiety disorder.  Duration is years.  f. Some anxiety obviously related to the above: Uncontrolled worrying, restlessness, irritability, feeling on edge, insomnia, poor energy and concentration.  \"All that anxiety leaves me exhausted.\"  56. Substances: CBD Gummies he bought over-the-counter.  Has been using them for about a week.  Denies all else except social alcohol.  57. Therapy: Has never tried, interested  58. Medication compliant: Yes  59. Psych ROS: Depression, anxiety, illness anxiety disorder.  Negative for psychosis and jay.  60. No SI HI AVH.    Access to Firearms: Denies    PHQ-9 Depression Screening  PHQ-9 Total Score: 1    Little interest or pleasure in doing things? 0-->not at all   Feeling down, depressed, or hopeless? 0-->not at all   Trouble falling or staying asleep, or sleeping too much? 1-->several days   Feeling tired or having little energy? 0-->not at all   Poor appetite or overeating? 0-->not at all   Feeling bad about yourself - or that you are a failure or have let yourself or your family down? 0-->not at all   Trouble concentrating on things, such as reading the newspaper or watching television? 0-->not at all   Moving or speaking so slowly that other people could have noticed? Or the opposite - being so fidgety or restless that you have been moving around a lot more than usual? 0-->not at all   Thoughts that you would be better off dead, or of hurting yourself in some way? 0-->not at all   PHQ-9 Total Score 1     CÉSAR-7  Feeling nervous, anxious or on edge: Not at all  Not being able to stop or control worrying: Not at all  Worrying too much about different things: Not at all  Trouble Relaxing: Not at all  Being so restless that it is hard to sit still: Not at all  Feeling afraid as if something awful might happen: Several days  Becoming easily annoyed or " irritable: Several days  CÉSAR 7 Total Score: 2  If you checked any problems, how difficult have these problems made it for you to do your work, take care of things at home, or get along with other people: Not difficult at all    Past Surgical History:  Past Surgical History:   Procedure Laterality Date   • CYST REMOVAL     • VASECTOMY      2020       Problem List:  Patient Active Problem List   Diagnosis   • Gynecomastia   • Ear pain, bilateral   • Anxiety with depression   • Class 2 obesity due to excess calories without serious comorbidity with body mass index (BMI) of 37.0 to 37.9 in adult   • Fatigue   • Snoring       Allergy:   No Known Allergies     Discontinued Medications:  Medications Discontinued During This Encounter   Medication Reason   • busPIRone (BUSPAR) 10 MG tablet Reorder       Current Medications:   Current Outpatient Medications   Medication Sig Dispense Refill   • busPIRone (BUSPAR) 10 MG tablet Take 1 tablet by mouth 2 (Two) Times a Day. 180 tablet 1   • Cetirizine HCl (ZyrTEC Allergy) 10 MG capsule As Needed.     • escitalopram (LEXAPRO) 20 MG tablet Take 1 tablet by mouth Daily. 90 tablet 1   • fluticasone (FLONASE) 50 MCG/ACT nasal spray As Needed.     • LORazepam (Ativan) 0.5 MG tablet Take 1 tablet by mouth Daily As Needed for Anxiety. (Patient taking differently: Take 0.5 mg by mouth As Needed for Anxiety.) 15 tablet 2     No current facility-administered medications for this visit.       Past Medical History:  Past Medical History:   Diagnosis Date   • Anxiety    • Depression    • Panic disorder        Past Psychiatric History:  Began Treatment: From primary care, years  Diagnoses: Depression and anxiety  Psychiatrist:Denies  Therapist: Couples therapy for marriage counseling years ago  Admission History:Denies  Medication Trials: See HPI  Self Harm: Denies  Suicide Attempts:Denies   Psychosis, Anxiety, Depression: Denies    Substance Abuse History:   Types: Social  "alcohol  Withdrawal Symptoms:Denies  Longest Period Sober:Not Applicable   AA: Not applicable     Social History:  Martial Status:  Employed:Yes  Kids:Yes  House:Lives in a house   History: Denies    Social History     Socioeconomic History   • Marital status:    Tobacco Use   • Smoking status: Never   • Smokeless tobacco: Never   Vaping Use   • Vaping Use: Never used   Substance and Sexual Activity   • Alcohol use: Yes     Alcohol/week: 6.0 standard drinks     Types: 6 Cans of beer per week   • Drug use: Never   • Sexual activity: Yes     Partners: Female     Birth control/protection: Vasectomy       Family History:   Suicide Attempts: Denies  Suicide Completions:Denies      Family History   Problem Relation Age of Onset   • Anxiety disorder Mother    • Diabetes Father    • Anxiety disorder Maternal Grandfather    • Stroke Other        Developmental History:       Childhood: Deferred  High School:Completed  College: Some, he is a .  Applied sciences.    · Mental Status Exam  · Appearance  · : groomed, good eye contact, normal street clothes  · Behavior  · : pleasant and cooperative  · Motor  · : No abnormal  · Speech  · :normal rhythm, rate, volume, tone, not hyperverbal, not pressured, normal prosidy  · Mood  · : \"back to normal\"  · Affect  · : euthymic, mood congruent, good variability  · Thought Content  · : negative suicidal ideations, negative homicidal ideations, negative obsessions  · Perceptions  · : negative auditory hallucinations, negative visual hallucinations  · Thought Process  · : linear  · Insight/Judgement  · : Fair/fair  · Cognition  · : grossly intact  · Attention   : intact      Review of Systems:  Review of Systems   Constitutional: Negative for diaphoresis, fatigue and fever.   HENT: Negative for drooling.    Eyes: Negative for visual disturbance.   Respiratory: Negative for cough and shortness of breath.    Cardiovascular: Negative for chest pain, palpitations and " "leg swelling.   Gastrointestinal: Negative for abdominal pain, nausea and vomiting.   Endocrine: Negative for cold intolerance and heat intolerance.   Genitourinary: Negative for difficulty urinating.   Musculoskeletal: Negative for back pain, joint swelling and neck pain.   Allergic/Immunologic: Negative for immunocompromised state.   Neurological: Negative for dizziness, seizures, syncope, speech difficulty, weakness, light-headedness, numbness and headaches.   Psychiatric/Behavioral: Negative for confusion.       Physical Exam:  Physical Exam    Vital Signs:   /82   Pulse 63   Ht 172.7 cm (68\")   Wt 111 kg (244 lb 3.2 oz)   BMI 37.13 kg/m²      Lab Results:   Orders Only on 09/19/2022   Component Date Value Ref Range Status   • Testosterone, Total 10/06/2022 304  264 - 916 ng/dL Final    Adult male reference interval is based on a population of  healthy nonobese males (BMI <30) between 19 and 39 years old.  eugenia Bal.al. JCEM 2017,102;3586-7318. PMID: 93390149.   • Testosterone, Free 10/06/2022 8.4 (L)  8.7 - 25.1 pg/mL Final   Lab on 09/14/2022   Component Date Value Ref Range Status   • Glucose 09/14/2022 83  65 - 99 mg/dL Final   • BUN 09/14/2022 16  6 - 20 mg/dL Final   • Creatinine 09/14/2022 1.21  0.76 - 1.27 mg/dL Final   • Sodium 09/14/2022 139  136 - 145 mmol/L Final   • Potassium 09/14/2022 5.1  3.5 - 5.2 mmol/L Final   • Chloride 09/14/2022 104  98 - 107 mmol/L Final   • CO2 09/14/2022 26.4  22.0 - 29.0 mmol/L Final   • Calcium 09/14/2022 9.4  8.6 - 10.5 mg/dL Final   • Total Protein 09/14/2022 6.8  6.0 - 8.5 g/dL Final   • Albumin 09/14/2022 4.10  3.50 - 5.20 g/dL Final   • ALT (SGPT) 09/14/2022 21  1 - 41 U/L Final   • AST (SGOT) 09/14/2022 21  1 - 40 U/L Final   • Alkaline Phosphatase 09/14/2022 75  39 - 117 U/L Final   • Total Bilirubin 09/14/2022 0.5  0.0 - 1.2 mg/dL Final   • Globulin 09/14/2022 2.7  gm/dL Final   • A/G Ratio 09/14/2022 1.5  g/dL Final   • BUN/Creatinine Ratio " 09/14/2022 13.2  7.0 - 25.0 Final   • Anion Gap 09/14/2022 8.6  5.0 - 15.0 mmol/L Final   • eGFR 09/14/2022 80.6  >60.0 mL/min/1.73 Final    National Kidney Foundation and American Society of Nephrology (ASN) Task Force recommended calculation based on the Chronic Kidney Disease Epidemiology Collaboration (CKD-EPI) equation refit without adjustment for race.   • TSH 09/14/2022 1.800  0.270 - 4.200 uIU/mL Final   • Total Cholesterol 09/14/2022 147  0 - 200 mg/dL Final   • Triglycerides 09/14/2022 66  0 - 150 mg/dL Final   • HDL Cholesterol 09/14/2022 38 (L)  40 - 60 mg/dL Final   • LDL Cholesterol  09/14/2022 96  0 - 100 mg/dL Final   • VLDL Cholesterol 09/14/2022 13  5 - 40 mg/dL Final   • LDL/HDL Ratio 09/14/2022 2.52   Final   • Amphet/Methamphet, Screen 09/14/2022 Negative  Negative Final   • Barbiturates Screen, Urine 09/14/2022 Negative  Negative Final   • Benzodiazepine Screen, Urine 09/14/2022 Negative  Negative Final   • Cocaine Screen, Urine 09/14/2022 Negative  Negative Final   • Opiate Screen 09/14/2022 Negative  Negative Final   • THC, Screen, Urine 09/14/2022 Negative  Negative Final   • Methadone Screen, Urine 09/14/2022 Negative  Negative Final   • Oxycodone Screen, Urine 09/14/2022 Negative  Negative Final   • WBC 09/14/2022 5.06  3.40 - 10.80 10*3/mm3 Final   • RBC 09/14/2022 4.51  4.14 - 5.80 10*6/mm3 Final   • Hemoglobin 09/14/2022 15.4  13.0 - 17.7 g/dL Final   • Hematocrit 09/14/2022 43.4  37.5 - 51.0 % Final   • MCV 09/14/2022 96.2  79.0 - 97.0 fL Final   • MCH 09/14/2022 34.1 (H)  26.6 - 33.0 pg Final   • MCHC 09/14/2022 35.5  31.5 - 35.7 g/dL Final   • RDW 09/14/2022 11.9 (L)  12.3 - 15.4 % Final   • RDW-SD 09/14/2022 41.4  37.0 - 54.0 fl Final   • MPV 09/14/2022 9.7  6.0 - 12.0 fL Final   • Platelets 09/14/2022 256  140 - 450 10*3/mm3 Final   • Neutrophil % 09/14/2022 46.6  42.7 - 76.0 % Final   • Lymphocyte % 09/14/2022 37.0  19.6 - 45.3 % Final   • Monocyte % 09/14/2022 9.9  5.0 - 12.0 %  Final   • Eosinophil % 09/14/2022 5.3  0.3 - 6.2 % Final   • Basophil % 09/14/2022 1.0  0.0 - 1.5 % Final   • Immature Grans % 09/14/2022 0.2  0.0 - 0.5 % Final   • Neutrophils, Absolute 09/14/2022 2.36  1.70 - 7.00 10*3/mm3 Final   • Lymphocytes, Absolute 09/14/2022 1.87  0.70 - 3.10 10*3/mm3 Final   • Monocytes, Absolute 09/14/2022 0.50  0.10 - 0.90 10*3/mm3 Final   • Eosinophils, Absolute 09/14/2022 0.27  0.00 - 0.40 10*3/mm3 Final   • Basophils, Absolute 09/14/2022 0.05  0.00 - 0.20 10*3/mm3 Final   • Immature Grans, Absolute 09/14/2022 0.01  0.00 - 0.05 10*3/mm3 Final   • nRBC 09/14/2022 0.0  0.0 - 0.2 /100 WBC Final       EKG Results:  No orders to display       Imaging Results:  No Images in the past 120 days found..      Assessment & Plan   Diagnoses and all orders for this visit:    1. Mixed obsessional thoughts and acts (Primary)    2. Generalized anxiety disorder  -     busPIRone (BUSPAR) 10 MG tablet; Take 1 tablet by mouth 2 (Two) Times a Day.  Dispense: 180 tablet; Refill: 1    3. Major depressive disorder, recurrent episode, moderate (HCC)  -     busPIRone (BUSPAR) 10 MG tablet; Take 1 tablet by mouth 2 (Two) Times a Day.  Dispense: 180 tablet; Refill: 1    4. Illness anxiety disorder  -     busPIRone (BUSPAR) 10 MG tablet; Take 1 tablet by mouth 2 (Two) Times a Day.  Dispense: 180 tablet; Refill: 1    5. Panic attacks  -     busPIRone (BUSPAR) 10 MG tablet; Take 1 tablet by mouth 2 (Two) Times a Day.  Dispense: 180 tablet; Refill: 1    6. Insomnia due to mental condition        Visit Diagnoses:    ICD-10-CM ICD-9-CM   1. Mixed obsessional thoughts and acts  F42.2 300.3   2. Generalized anxiety disorder  F41.1 300.02   3. Major depressive disorder, recurrent episode, moderate (HCC)  F33.1 296.32   4. Illness anxiety disorder  F45.21 300.7   5. Panic attacks  F41.0 300.01   6. Insomnia due to mental condition  F51.05 300.9     327.02     2/13: Doing well, no changes. 17 minutes of supportive  psychotherapy with goal to strengthen defenses, promote problems solving, restore adaptive functioning and provide symptom relief. The therapeutic alliance was strengthened to encourage the patient to express their thoughts and feelings. Esteem building was enhanced through praise, reassurance, normalizing and encouragement. Coping skills were enhanced to build distress tolerance skills and emotional regulation. Allowed patient to freely discuss issues without interruption or judgement with unconditional positive regard, active listening skills, and empathy. Provided a safe, confidential environment to facilitate the development of a positive therapeutic relationship and encourage open, honest communication. Assisted patient in identifying risk factors which would indicate the need for higher level of care including thoughts to harm self or others and/or self-harming behavior and encouraged patient to contact this office, call 911, or present to the nearest emergency room should any of these events occur. Assisted patient in processing session content; acknowledged and normalized patient’s thoughts, feelings, and concerns by utilizing a person-centered approach in efforts to build appropriate rapport and a positive therapeutic relationship with open and honest communication. Patient given education on medication side effects, diagnosis/illness and relapse symptoms. Plan to continue supportive psychotherapy in next appointment to provide symptom relief.  Diagnoses: as above  Symptoms: as above  Functional status: good  Mental Status Exam: as above    Treatment plan: Medication management and supportive psychotherapy  Prognosis: good  Progress: stable, well  3 mos      12/13: Weight gain, possibly attributable to Wellbutrin, although it is somewhat difficult to say.  Given the timing, it is unlikely Lexapro.  Discontinue Wellbutrin and start BuSpar and monitor weight.  Patient will also keep a food diary.  Other  causes should be considered as well. 17   Prognosis: good  Progress: improved  2 mos      8/25: Start bupropion to target anx and sexual dysfxn. Also concentration issues. 17 minutes of supportive psychotherapy  Treatment plan: Medication management and supportive psychotherapy  Prognosis: good  Progress: getting better  6 wks      7/8: Increase lexapro. 16 minutes of supportive psychotherapy   Treatment plan: Medication management and supportive psychotherapy  Prognosis: good  Progress: better  6 wks      5/26: Illness anxiety disorder, possible somatic symptom disorder.  Rule out OCD.  Residual depression and anxiety as well.  Continue Lexapro and start Ativan as needed.  Referral to therapy.  Consider Luvox or clomipramine for OCD.  Also high dose fluoxetine. 17 minutes of supportive psychotherapy   Treatment plan: Medication management and supportive psychotherapy  Prognosis: Good  Progress: First visit  6 weeks    PLAN:  61. Safety: No acute safety concerns  62. Therapy: Referral Made  63. Risk Assessment: Risk of self-harm acutely is moderate.  Risk factors include anxiety disorder, mood disorder, and recent psychosocial stressors (pandemic). Protective factors include no family history, no access to weapons, no present SI, no history of suicide attempts or self-harm in the past, minimal AODA, healthcare seeking, future orientation, willingness to engage in care.  Risk of self-harm chronically is also moderate, but could be further elevated in the event of treatment noncompliance and/or AODA.  64. Meds:  a. CONTINUE Lexapro 10 (not 20) mg daily.Risks, benefits, alternatives discussed with patient including GI upset, nausea vomiting diarrhea, theoretical decrease of seizure threshold predisposing the patient to a slightly higher seizure risk, headaches, sexual dysfunction, serotonin syndrome, bleeding risk, increased suicidality in patients 24 years and younger.  After discussion of these risks and benefits,  the patient voiced understanding and agreed to proceed.  b. STOP bupropion  mg daily. WG? Risks, benefits, alternatives discussed with patient including nausea, GI upset, increased energy, exacerbation of irritability, insomnia, lowering of seizure threshold.  After discussion of these risks and benefits, the patient voiced understanding and agreed to proceed.  c. CONTINUE buspar 10 mg po bid. Risks, benefits, alternatives discussed with patient including nausea, GI upset, mild sedation, falls risk.  After discussion of these risks and benefits, the patient voiced understanding and agreed to proceed.  d. CONTINUE Ativan 0.5 mg p.o. daily as needed anxiety. USES RARELY. Risks, benefits, alternatives discussed with patient including GI upset, sedation, dizziness, respiratory depression, falls risk.  After discussion of these risks and benefits, the patient voiced understanding and agreed to proceed. Cathy reviewed. UDS ordered.  CSA signed.  65. Labs: UDS never got    Patient screened positive for depression based on a PHQ-9 score of  on . Follow-up recommendations include: Prescribed antidepressant medication treatment.           TREATMENT PLAN/GOALS: Continue supportive psychotherapy efforts and medications as indicated. Treatment and medication options discussed during today's visit. Patient acknowledged and verbally consented to continue with current treatment plan and was educated on the importance of compliance with treatment and follow-up appointments.    MEDICATION ISSUES:  CATHY reviewed as expected.  Discussed medication options and treatment plan of prescribed medication as well as the risks, benefits, and side effects including potential falls, possible impaired driving and metabolic adversities among others. Patient is agreeable to call the office with any worsening of symptoms or onset of side effects. Patient is agreeable to call 911 or go to the nearest ER should he/she begin having SI/HI. No  medication side effects or related complaints today.     MEDS ORDERED DURING VISIT:  New Medications Ordered This Visit   Medications   • busPIRone (BUSPAR) 10 MG tablet     Sig: Take 1 tablet by mouth 2 (Two) Times a Day.     Dispense:  180 tablet     Refill:  1       Return in about 3 months (around 5/13/2023).         This document has been electronically signed by José Amaya MD  February 13, 2023 16:20 EST       Part of this note may be an electronic transcription/translation of spoken language to printed text using the Dragon Dictation System.

## 2023-02-13 NOTE — PATIENT INSTRUCTIONS
1.  Please return to clinic at your next scheduled visit.  Contact the clinic ([phone]) at least 24 hours prior in the event you need to cancel.  2.  Do no harm to yourself or others.    3.  Avoid alcohol and drugs.    4.  Take all medications as prescribed.  Please contact the clinic with any concerns. If you are in need of medication refills, please call the clinic at [phone].    5. Should you want to get in touch with your provider, Dr. José Amaya, please utilize Box or contact the office ([phone]), and staff will be able to page Dr. Amaya directly.  6.  In the event you have personal crisis, contact the following crisis numbers: Suicide Prevention Hotline 1-972.312.1218; MICAH Helpline 9-298-492-XEYD; [facility] Emergency Room [phone]; text HELLO to [phone]; or 771.     SPECIFIC RECOMMENDATIONS:     1.      Medications discussed at this encounter:                   - no changes     2.      Psychotherapy recommendations:

## 2023-02-23 ENCOUNTER — HOSPITAL ENCOUNTER (OUTPATIENT)
Dept: SLEEP MEDICINE | Facility: HOSPITAL | Age: 33
Discharge: HOME | End: 2023-02-23
Admitting: INTERNAL MEDICINE
Payer: COMMERCIAL

## 2023-02-23 DIAGNOSIS — R06.83 SNORING: ICD-10-CM

## 2023-02-23 DIAGNOSIS — R40.0 DAYTIME SLEEPINESS: ICD-10-CM

## 2023-02-23 PROCEDURE — 95806 SLEEP STUDY UNATT&RESP EFFT: CPT

## 2023-03-01 ENCOUNTER — TELEPHONE (OUTPATIENT)
Dept: SLEEP MEDICINE | Facility: HOSPITAL | Age: 33
End: 2023-03-01
Payer: COMMERCIAL

## 2023-05-15 ENCOUNTER — TELEMEDICINE (OUTPATIENT)
Dept: PSYCHIATRY | Facility: CLINIC | Age: 33
End: 2023-05-15
Payer: COMMERCIAL

## 2023-05-15 DIAGNOSIS — F51.05 INSOMNIA DUE TO MENTAL CONDITION: ICD-10-CM

## 2023-05-15 DIAGNOSIS — F41.0 PANIC ATTACKS: ICD-10-CM

## 2023-05-15 DIAGNOSIS — F42.2 MIXED OBSESSIONAL THOUGHTS AND ACTS: Primary | ICD-10-CM

## 2023-05-15 DIAGNOSIS — F33.1 MAJOR DEPRESSIVE DISORDER, RECURRENT EPISODE, MODERATE: ICD-10-CM

## 2023-05-15 DIAGNOSIS — F41.1 GENERALIZED ANXIETY DISORDER: ICD-10-CM

## 2023-05-15 DIAGNOSIS — F45.21 ILLNESS ANXIETY DISORDER: ICD-10-CM

## 2023-05-15 PROCEDURE — 99214 OFFICE O/P EST MOD 30 MIN: CPT | Performed by: STUDENT IN AN ORGANIZED HEALTH CARE EDUCATION/TRAINING PROGRAM

## 2023-05-15 NOTE — PATIENT INSTRUCTIONS
1.  Please return to clinic at your next scheduled visit.  Contact the clinic ([phone]) at least 24 hours prior in the event you need to cancel.  2.  Do no harm to yourself or others.    3.  Avoid alcohol and drugs.    4.  Take all medications as prescribed.  Please contact the clinic with any concerns. If you are in need of medication refills, please call the clinic at [phone].    5. Should you want to get in touch with your provider, Dr. José Amaya, please utilize [facility] or contact the office ([phone]), and staff will be able to page Dr. Amaya directly.  6.  In the event you have personal crisis, contact the following crisis numbers: Suicide Prevention Hotline 1-898.753.4519; [facility] Helpline 7-927-037-MICAH; [facility] Emergency Room [phone]; text HELLO to [phone]; or 475.

## 2023-05-15 NOTE — PROGRESS NOTES
"Subjective   Hamlet Kong is a 33 y.o. male who presents today for initial evaluation     Referring Provider:  No referring provider defined for this encounter.    Chief Complaint: Anxiety and depression    History of Present Illness:     5/25: Chart review: Significant anxiety and depression.  Recently seen by primary care April 20.  Dr. Braun started the patient on Pristiq 25 mg a day along with BuSpar 10 twice daily.  Patient tried and failed Trintellix and hydroxyzine.  Also interested in psychotherapy.  Anxiety since at least 2018.  Failed Lexapro, Zoloft, vilazodone, pristiq.  PDMP is empty.  January labs show elevated glucose 179, low sodium 31, chloride 96 low, otherwise reassuring CMP, thyroid studies, lipids, iron studies, some abnormalities on the CBC, but white count, hemoglobin and hematocrit and platelets are within normal limits.  Holter monitor in October 2020 showing periods of sinus tachycardia.  January 2021: Sinus 406.  No head imaging.  Not a lot in Care Everywhere.  May have been restarted on Lexapro 10 mg a day a few days back.    \"Hamlet\"        5/15: In person interview:  1. Chart review: Seen by sleep medicine.  a. Home sleep study in February shows mild BRET.  Recommend auto CPAP.  2. Planning: Doing well at last visit.  3. \"doing pretty good.\"  a. I got a cpap machine -- definitely made a (good) difference. Would stop breathing 11-12 times a night.  b. \"In a pretty good spot.\"  4. Mood/Depression: mood got better  5. Anxiety: minimal  6. Panic attacks:  7. Energy: improved  8. Concentration: good  9. Sleeping: well  10. Eating: no weight gain, still down, might have lost more weight.  11. Refills: n  12. Substances: def  13. Therapy: n  14. Medication compliant: y  15. SE: n  16. No SI HI AVH.        2/13: In person interview:  17. Chart review: Seen by Daniella ochoa, 3 times, sleep medicine, internal medicine.  a. Worried about her blood.  Contamination.  b. He will be " "getting a home sleep study for hypersomnia.  c. Acute bacterial sinusitis, started on Augmentin.  18. Planning: Weight gain, possibly attributable to Wellbutrin, although it is somewhat difficult to say.  Given the timing, it is unlikely Lexapro.  Discontinue Wellbutrin and start BuSpar and monitor weight.  Patient will also keep a food diary.  Other causes should be considered as well.   19. \"I've lost some weight, been on a diet.\"  a. I've been doing pretty good.  20. Mood/Depression: under control  21. Anxiety: under control  22. Panic attacks: n  23. Energy: still tired, daytime sleepiness  24. Concentration: good  25. Sleeping: well  26. Eatin lb wl   a. Cut down on carbs  27. Refills: y  28. Substances: n  29. Therapy: Daniella, that's helping  30. Medication compliant: y  31. No SI HI AVH.      : In person interview:  32. Chart review: Continuing psychotherapy.  Seen by primary care in November for weight gain and snoring.  Patient and primary care feel that his weight gain is potentially due to his psychiatric medications, however weight gain with Wellbutrin is unlikely, as well with Lexapro.  Referred for sleep study.  33. Planning: Started Wellbutrin at the last visit to target anxiety and sexual dysfunction.  Could switch Lexapro to a different medication, like Trintellix.  Did he really fail any of the above medications?  Especially considering that he is doing well on Lexapro and Wellbutrin.  Could also discontinue Lexapro and only keep him on Wellbutrin, increasing the Wellbutrin.  34. \"Doing pretty good.\"  a. \"Pretty much back to normal\"  b. Sexual dysfunction is \"better... taken care of.\"  c. WG of 20 lbs since   i. Was on lexapro before May; weight was stable from July to August.  ii. Started bupropion in August -- if anything is the culprit, it is wellbutrin. This is all the more odd because it is an appetite suppressant, but it can happen.  iii. That being said, the wg may not even " "be attributable wellbutrin, but some other confounding factor  d. Also notes fatigue which he also attributes to low testosterone; wg not associated with low testosterone  e. Food diary advised  35. Mood/Depression: not depressed  36. Anxiety:  Not worrying  37. Panic attacks: n  38. Energy: good  39. Concentration: good  40. Sleeping: well, but snoring a lot  41. Eating: wg  42. Refills: y  43. Substances: n  44. Therapy: Daniella  45. Medication compliant: y  46. No SI HI AVH.      8/25: In person interview:  47. Chart review: No new.  48. Planning: Increased Lexapro from 10 to 15 mg at the last visit.  49. \"I feel like I'm doing much better.\"  a. Anxiety not as bad  b. Longer periods between flair ups, and flair ups aren't as bad.  c. Used ativan once  d. Sexual dysfunction: hard to orgasm  e. Still has some concentration issues  50. Mood/Depression: 1/10  51. Anxiety: 2 or 3/10  52. Panic attacks: n  53. Energy: good  54. Concentration: stable  55. Sleeping: well  56. Eating: stable  57. Refills: n  58. Substances: n  59. Therapy: starts 9/6  60. Medication compliant:  61. No SI HI AVH.      7/8: In person interview:  62. Chart review: No new.  Sees Daniella for psychotherapy soon.  63. Planning: Started Ativan at last visit to complement Lexapro 10 mg Daily.  Consider increasing Lexapro.  64. \"It's been better.\"  a. Used ativan once, helpful.  b. For 2 weeks I felt great, then it came back.  c. Not wanting to do anything \"comes and goes\" for an hour or so  d. Lack of motivation  e. Constant worry about symptoms  f. Tolerating lexapro, no sexual SE.  g. When has intrusive thoughts:  i. A game he plays  ii. Work  iii. podcasts help  iv. Mindfulness education today, also box breathing.  65. Mood/Depression: 1/10, brief episodes of anhedonia  66. Anxiety: 6/10, on edge  67. Therapy: Daniella in September 68. Medication compliant: y  69. No SI HI AVH.      5/26: In person.  Interview:  70. Chart review:   71. His/Her " "Story: \"I'm kindof a hypochondriac.\"  a. P10, G14  b. Describes obsessions that occur re: medication SE. Compulsions of reading voraciously on them over the internet.  He is always worried that he is sick.  Usually he does not have symptoms.  Sometimes he does though (somatic symptom disorder).  i. The anxiety gets so bad that he sometimes, rarely, has panic attacks.  He was seen in the ER last year in January for a panic attack.  c. Possible OCD.  d. Only stopped lexapro before due to sexual side effects in the past. Has never been on bupropion.  It is working quite well for him now.  \"It has calmed me down and only a week.\"  e. Some residual depressed mood, insomnia, feelings of guilt, poor energy and concentration related to the illness anxiety disorder.  Duration is years.  f. Some anxiety obviously related to the above: Uncontrolled worrying, restlessness, irritability, feeling on edge, insomnia, poor energy and concentration.  \"All that anxiety leaves me exhausted.\"  72. Substances: CBD Gummies he bought over-the-counter.  Has been using them for about a week.  Denies all else except social alcohol.  73. Therapy: Has never tried, interested  74. Medication compliant: Yes  75. Psych ROS: Depression, anxiety, illness anxiety disorder.  Negative for psychosis and jay.  76. No SI HI AVH.    Access to Firearms: Denies    PHQ-9 Depression Screening  PHQ-9 Total Score:      Little interest or pleasure in doing things?     Feeling down, depressed, or hopeless?     Trouble falling or staying asleep, or sleeping too much?     Feeling tired or having little energy?     Poor appetite or overeating?     Feeling bad about yourself - or that you are a failure or have let yourself or your family down?     Trouble concentrating on things, such as reading the newspaper or watching television?     Moving or speaking so slowly that other people could have noticed? Or the opposite - being so fidgety or restless that you have " been moving around a lot more than usual?     Thoughts that you would be better off dead, or of hurting yourself in some way?     PHQ-9 Total Score       CÉSAR-7       Past Surgical History:  Past Surgical History:   Procedure Laterality Date   • CYST REMOVAL     • VASECTOMY      2020       Problem List:  Patient Active Problem List   Diagnosis   • Gynecomastia   • Ear pain, bilateral   • Anxiety with depression   • Class 2 obesity due to excess calories without serious comorbidity with body mass index (BMI) of 37.0 to 37.9 in adult   • Fatigue   • Snoring       Allergy:   No Known Allergies     Discontinued Medications:  There are no discontinued medications.    Current Medications:   Current Outpatient Medications   Medication Sig Dispense Refill   • busPIRone (BUSPAR) 10 MG tablet Take 1 tablet by mouth 2 (Two) Times a Day. 180 tablet 1   • Cetirizine HCl (ZyrTEC Allergy) 10 MG capsule As Needed.     • escitalopram (LEXAPRO) 20 MG tablet Take 1 tablet by mouth Daily. 90 tablet 1   • fluticasone (FLONASE) 50 MCG/ACT nasal spray As Needed.     • LORazepam (Ativan) 0.5 MG tablet Take 1 tablet by mouth Daily As Needed for Anxiety. (Patient taking differently: Take 0.5 mg by mouth As Needed for Anxiety.) 15 tablet 2     No current facility-administered medications for this visit.       Past Medical History:  Past Medical History:   Diagnosis Date   • Anxiety    • Depression    • Panic disorder        Past Psychiatric History:  Began Treatment: From primary care, years  Diagnoses: Depression and anxiety  Psychiatrist:Denies  Therapist: Couples therapy for marriage counseling years ago  Admission History:Denies  Medication Trials: See HPI  Self Harm: Denies  Suicide Attempts:Denies   Psychosis, Anxiety, Depression: Denies    Substance Abuse History:   Types: Social alcohol  Withdrawal Symptoms:Denies  Longest Period Sober:Not Applicable   AA: Not applicable     Social History:  Martial  "Status:  Employed:Yes  Kids:Yes  House:Lives in a house   History: Denies    Social History     Socioeconomic History   • Marital status:    Tobacco Use   • Smoking status: Never   • Smokeless tobacco: Never   Vaping Use   • Vaping Use: Never used   Substance and Sexual Activity   • Alcohol use: Yes     Alcohol/week: 6.0 standard drinks     Types: 6 Cans of beer per week   • Drug use: Never   • Sexual activity: Yes     Partners: Female     Birth control/protection: Vasectomy       Family History:   Suicide Attempts: Denies  Suicide Completions:Denies      Family History   Problem Relation Age of Onset   • Anxiety disorder Mother    • Diabetes Father    • Anxiety disorder Maternal Grandfather    • Stroke Other        Developmental History:       Childhood: Deferred  High School:Completed  College: Some, he is a .  [employer].    · Mental Status Exam  · Appearance  · : groomed, good eye contact, normal street clothes  · Behavior  · : pleasant and cooperative  · Motor  · : No abnormal  · Speech  · :normal rhythm, rate, volume, tone, not hyperverbal, not pressured, normal prosidy  · Mood  · : \"doing good\"  · Affect  · : euthymic, mood congruent, good variability  · Thought Content  · : negative suicidal ideations, negative homicidal ideations, negative obsessions  · Perceptions  · : negative auditory hallucinations, negative visual hallucinations  · Thought Process  · : linear  · Insight/Judgement  · : Fair/fair  · Cognition  · : grossly intact  · Attention   : intact      Review of Systems:  Review of Systems   Constitutional: Negative for diaphoresis, fatigue and fever.   HENT: Negative for drooling.    Eyes: Negative for visual disturbance.   Respiratory: Negative for cough and shortness of breath.    Cardiovascular: Negative for chest pain, palpitations and leg swelling.   Gastrointestinal: Negative for abdominal pain, nausea and vomiting.   Endocrine: Negative for cold intolerance " and heat intolerance.   Genitourinary: Negative for difficulty urinating.   Musculoskeletal: Negative for back pain, joint swelling and neck pain.   Allergic/Immunologic: Negative for immunocompromised state.   Neurological: Negative for dizziness, seizures, syncope, speech difficulty, weakness, light-headedness, numbness and headaches.   Psychiatric/Behavioral: Negative for confusion.       Physical Exam:  Physical Exam    Vital Signs:   There were no vitals taken for this visit.     Lab Results:   No visits with results within 6 Month(s) from this visit.   Latest known visit with results is:   Orders Only on 09/19/2022   Component Date Value Ref Range Status   • Testosterone, Total 10/06/2022 304  264 - 916 ng/dL Final    Adult male reference interval is based on a population of  healthy nonobese males (BMI <30) between 19 and 39 years old.  Mally et.al. JCEM 2017,102;9345-0726. PMID: 97622250.   • Testosterone, Free 10/06/2022 8.4 (L)  8.7 - 25.1 pg/mL Final       EKG Results:  No orders to display       Imaging Results:  No Images in the past 120 days found..      Assessment & Plan   Diagnoses and all orders for this visit:    1. Mixed obsessional thoughts and acts (Primary)    2. Generalized anxiety disorder    3. Major depressive disorder, recurrent episode, moderate    4. Illness anxiety disorder    5. Panic attacks    6. Insomnia due to mental condition        Visit Diagnoses:    ICD-10-CM ICD-9-CM   1. Mixed obsessional thoughts and acts  F42.2 300.3   2. Generalized anxiety disorder  F41.1 300.02   3. Major depressive disorder, recurrent episode, moderate  F33.1 296.32   4. Illness anxiety disorder  F45.21 300.7   5. Panic attacks  F41.0 300.01   6. Insomnia due to mental condition  F51.05 300.9     327.02       5/15: Doing quite well. No changes. 7 minutes of supportive psychotherapy with goal to strengthen defenses, promote problems solving, restore adaptive functioning and provide symptom relief.  The therapeutic alliance was strengthened to encourage the patient to express their thoughts and feelings. Esteem building was enhanced through praise, reassurance, normalizing and encouragement. Coping skills were enhanced to build distress tolerance skills and emotional regulation. Allowed patient to freely discuss issues without interruption or judgement with unconditional positive regard, active listening skills, and empathy. Provided a safe, confidential environment to facilitate the development of a positive therapeutic relationship and encourage open, honest communication. Assisted patient in identifying risk factors which would indicate the need for higher level of care including thoughts to harm self or others and/or self-harming behavior and encouraged patient to contact this office, call 911, or present to the nearest emergency room should any of these events occur. Assisted patient in processing session content; acknowledged and normalized patient’s thoughts, feelings, and concerns by utilizing a person-centered approach in efforts to build appropriate rapport and a positive therapeutic relationship with open and honest communication. Patient given education on medication side effects, diagnosis/illness and relapse symptoms. Plan to continue supportive psychotherapy in next appointment to provide symptom relief.  Diagnoses: as above  Symptoms: as above  Functional status: good  Mental Status Exam: as above    Treatment plan: Medication management and supportive psychotherapy  Prognosis: good  Progress: improved, well  4 mos        2/13: Doing well, no changes. 17 Progress: stable, well  3 mos      12/13: Weight gain, possibly attributable to Wellbutrin, although it is somewhat difficult to say.  Given the timing, it is unlikely Lexapro.  Discontinue Wellbutrin and start BuSpar and monitor weight.  Patient will also keep a food diary.  Other causes should be considered as well. 17   Prognosis:  good  Progress: improved  2 mos      8/25: Start bupropion to target anx and sexual dysfxn. Also concentration issues. 17 minutes of supportive psychotherapy  Treatment plan: Medication management and supportive psychotherapy  Prognosis: good  Progress: getting better  6 wks      7/8: Increase lexapro. 16 minutes of supportive psychotherapy   Treatment plan: Medication management and supportive psychotherapy  Prognosis: good  Progress: better  6 wks      5/26: Illness anxiety disorder, possible somatic symptom disorder.  Rule out OCD.  Residual depression and anxiety as well.  Continue Lexapro and start Ativan as needed.  Referral to therapy.  Consider Luvox or clomipramine for OCD.  Also high dose fluoxetine. 17 minutes of supportive psychotherapy   Treatment plan: Medication management and supportive psychotherapy  Prognosis: Good  Progress: First visit  6 weeks    PLAN:  77. Safety: No acute safety concerns  78. Therapy: Referral Made  79. Risk Assessment: Risk of self-harm acutely is moderate.  Risk factors include anxiety disorder, mood disorder, and recent psychosocial stressors (pandemic). Protective factors include no family history, no access to weapons, no present SI, no history of suicide attempts or self-harm in the past, minimal AODA, healthcare seeking, future orientation, willingness to engage in care.  Risk of self-harm chronically is also moderate, but could be further elevated in the event of treatment noncompliance and/or AODA.  80. Meds:  a. CONTINUE Lexapro 10 (not 20) mg daily.Risks, benefits, alternatives discussed with patient including GI upset, nausea vomiting diarrhea, theoretical decrease of seizure threshold predisposing the patient to a slightly higher seizure risk, headaches, sexual dysfunction, serotonin syndrome, bleeding risk, increased suicidality in patients 24 years and younger.  After discussion of these risks and benefits, the patient voiced understanding and agreed to  proceed.  b. STOP bupropion  mg daily. WG? 2/23  c. CONTINUE buspar 10 mg po bid. Risks, benefits, alternatives discussed with patient including nausea, GI upset, mild sedation, falls risk.  After discussion of these risks and benefits, the patient voiced understanding and agreed to proceed.  d. CONTINUE Ativan 0.5 mg p.o. daily as needed anxiety. USES RARELY. Risks, benefits, alternatives discussed with patient including GI upset, sedation, dizziness, respiratory depression, falls risk.  After discussion of these risks and benefits, the patient voiced understanding and agreed to proceed. Cathy reviewed. UDS ordered.  CSA signed.  81. Labs: UDS never got    Patient screened positive for depression based on a PHQ-9 score of  on . Follow-up recommendations include: Prescribed antidepressant medication treatment.           TREATMENT PLAN/GOALS: Continue supportive psychotherapy efforts and medications as indicated. Treatment and medication options discussed during today's visit. Patient acknowledged and verbally consented to continue with current treatment plan and was educated on the importance of compliance with treatment and follow-up appointments.    MEDICATION ISSUES:  CATHY reviewed as expected.  Discussed medication options and treatment plan of prescribed medication as well as the risks, benefits, and side effects including potential falls, possible impaired driving and metabolic adversities among others. Patient is agreeable to call the office with any worsening of symptoms or onset of side effects. Patient is agreeable to call 911 or go to the nearest ER should he/she begin having SI/HI. No medication side effects or related complaints today.     MEDS ORDERED DURING VISIT:  No orders of the defined types were placed in this encounter.      Return in about 4 months (around 9/15/2023).         This document has been electronically signed by José Amaya MD  May 15, 2023 16:40 EDT        Part of this  note may be an electronic transcription/translation of spoken language to printed text using the Dragon Dictation System.

## 2023-07-25 ENCOUNTER — OFFICE VISIT (OUTPATIENT)
Dept: PSYCHIATRY | Facility: CLINIC | Age: 33
End: 2023-07-25
Payer: COMMERCIAL

## 2023-07-25 VITALS
BODY MASS INDEX: 38.31 KG/M2 | DIASTOLIC BLOOD PRESSURE: 76 MMHG | WEIGHT: 252.8 LBS | HEART RATE: 76 BPM | SYSTOLIC BLOOD PRESSURE: 119 MMHG | HEIGHT: 68 IN

## 2023-07-25 DIAGNOSIS — F41.0 GENERALIZED ANXIETY DISORDER WITH PANIC ATTACKS: Primary | ICD-10-CM

## 2023-07-25 DIAGNOSIS — F41.1 GENERALIZED ANXIETY DISORDER WITH PANIC ATTACKS: Primary | ICD-10-CM

## 2023-07-25 DIAGNOSIS — F42.2 MIXED OBSESSIONAL THOUGHTS AND ACTS: ICD-10-CM

## 2023-07-25 NOTE — PSYCHOTHERAPY NOTE
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 Am I going to hurt my kids? Am I going to hurt myself?  COMPULSION - mental ritual, reassurance \"I would never do that\"     DISEASE PARK  TRIGGER - movies about any disease (or depression) - I lock on to the symptoms of the characters - if they have a symptom, I have to make sure I don't have that symptom  OBSESSION - Do I have depression?  COMPULSION - mental ritual, reassurance     MORALITY PARK  TRIGGER - argument or disagreement with wife (turns into anxiety), I don't argue - I become very passive, I won't talk a lot  OBSESSION - Am I doing the right thing? I'll never do anything right  COMPULSION - sit by myself and be quiet, relax, calm everything, recap the conversation with her  FEARED CONSEQUENCE - wife leaving, taking kids, splitting custody - alone (more distress, trying to avoid it)     I don't like confrontation at all - I don't like to bring up anything confrontational  I don't like making people - fear abandonment (worse after wife cheated in 2020)  ________________     10 - son - Jamel - (jealousy of 7 year old)   7 - daughter (jealousy of 2 year old)  2 - daughter (can play well with 10 year " old)  _________________    Not bad distressing thoughts  Better emotional expression - used the feeling wheel with my wife    Hangover the next day after drinking bourbon  Intrusive thoughts were worse  Anxiety after a hangover  Moral stuff - did I do something bad  Did I say something bad, who was I with  My friend from [place]    It's been a while since I drinking    More like did I do anything wrong (moral stuff)    Demetra is a nurse

## 2023-07-25 NOTE — PROGRESS NOTES
"Progress Note    Date: 07/25/2023  Time In: 1552  Time Out: 1648    Patient Legal Name: Hamlet Kong  Patient Age: 33 y.o.    CHIEF COMPLAINT: Anxiety, OCD    Subjective   History of Present Illness     From previous progress note on 6/22/23:  Patient was given a list of basic emotions to review and was encouraged to use I-statements as discussed in session in order to start recognizing and expressing emotions.  Will discuss further next session.    Hamlet is a 33 y.o. male who presents today as a follow-up for continued psychotherapy.  Patient reports that since last session, he has used the \"feelings wheel\" to be more emotionally expressive with his wife, and found it to be helpful while communicating his emotions.  He states that his wife has been supportive, and they are planning to have a date night this upcoming weekend which he is looking forward to.  He states that the frequency of his intrusive thoughts is decreasing overall.      Assessment    Mental Status Exam     Appearance: good hygiene and dressed appropriately for the weather  Behavior: restless  Cooperation:  engaged, cooperative, attentive, and friendly  Eye Contact:  good  Affect:  congruent  Mood: anxious  Speech: responsive  Thought Process:  linear and organized  Thought Content: intrusive thoughts  Suicidal: denies  Homicidal:  denies  Hallucinations:  denies  Memory:  intact  Orientation:  person, place, time, and situation  Reliability:  reliable  Insight:  good  Judgment:  good     Clinical Intervention       ICD-10-CM ICD-9-CM   1. Generalized anxiety disorder with panic attacks  F41.1 300.02    F41.0 300.01   2. Mixed obsessional thoughts and acts  F42.2 300.3        Individual psychotherapy was provided utilizing ACT techniques to restore adaptive functioning, establish new coping skills, challenge avoidance, increase acceptance, manage stress, recognize patterns of behavior, acknowledge sources of feelings and behaviors, " challenge negative thinking patterns, and provide support.  Therapist provided psychoeducation on the importance of practicing mindfulness, and exploring meditation as a potential coping mechanism to patient's anxiety.  Patient reports that he has been making behavioral changes to lose weight, as this is a goal that he has set for himself.  He reports that a few days ago, he noticed an increase in intrusive thoughts, but he chose to challenge the urge to engage in compulsions, and has since noticed a reduction in these thoughts (likely OCD-related).    Plan   Plan & Goals     Therapist encouraged patient to begin practicing meditation, by possibly getting a beginner karla to start guided meditation in order to reduce symptoms of anxiety and OCD.  Will discuss further next session.    Patient acknowledged and verbally consented to continue working toward resolving current treatment plan goals and was educated on the importance of participation in the therapeutic process.  Patient will remain compliant with medication regimen as prescribed. Discuss any medication side effects, questions or concerns with prescribing provider.  Call 911 or present to the nearest emergency room in an emergency situation.  National Suicide Prevention Lifeline: Call 988. The Lifeline provides 24/7, free and confidential support for people in distress, prevention and crisis resources.    Return in about 2 weeks (around 8/8/2023) for Next scheduled follow up.    ____________________  This document has been electronically signed by Daniella Evans LCSW  July 25, 2023 16:59 EDT    Part of this note may be an electronic transcription/translation of spoken language to printed text using the Dragon Dictation System.

## 2023-07-31 DIAGNOSIS — F41.0 PANIC ATTACKS: ICD-10-CM

## 2023-07-31 DIAGNOSIS — F33.1 MAJOR DEPRESSIVE DISORDER, RECURRENT EPISODE, MODERATE: ICD-10-CM

## 2023-07-31 DIAGNOSIS — F45.21 ILLNESS ANXIETY DISORDER: ICD-10-CM

## 2023-07-31 DIAGNOSIS — F41.1 GENERALIZED ANXIETY DISORDER: ICD-10-CM

## 2023-07-31 DIAGNOSIS — F51.05 INSOMNIA DUE TO MENTAL CONDITION: ICD-10-CM

## 2023-08-02 RX ORDER — ESCITALOPRAM OXALATE 20 MG/1
20 TABLET ORAL DAILY
Qty: 90 TABLET | Refills: 1 | Status: SHIPPED | OUTPATIENT
Start: 2023-08-02

## 2023-08-22 NOTE — PROGRESS NOTES
Progress Note    Date: 08/31/2023  Time In: 1603  Time Out: 1656    Patient Legal Name: Hamlet Kong  Patient Age: 33 y.o.    CHIEF COMPLAINT: OCD, Anxiety    Subjective   History of Present Illness     From previous progress note on 6/22/23:  Patient was given a list of basic emotions to review and was encouraged to use I-statements as discussed in session in order to start recognizing and expressing emotions.  Will discuss further next session.    Assessment    Mental Status Exam     Appearance: good hygiene and dressed appropriately for the weather  Behavior: calm  Cooperation:  engaged, cooperative, attentive, and friendly  Eye Contact:  good  Affect:  bright  Mood: anxious  Speech: responsive  Thought Process:  linear and organized  Thought Content: intrusive thoughts  Suicidal: denies  Homicidal:  denies  Hallucinations:  denies  Memory:  intact  Orientation:  person, place, time, and situation  Reliability:  reliable  Insight:  good  Judgment:  good     Clinical Intervention       ICD-10-CM ICD-9-CM   1. Mixed obsessional thoughts and acts  F42.2 300.3   2. Generalized anxiety disorder with panic attacks  F41.1 300.02    F41.0 300.01        Hamlet is a 33 y.o. male who presents today as a follow-up for continued psychotherapy. Individual psychotherapy was provided utilizing ERP techniques to encourage expression of thoughts and feelings, establish new coping skills, manage stress, recognize patterns of behavior, acknowledge sources of feelings and behaviors, challenge negative thinking patterns, and provide support.  Therapist utilized reflective listening and provided support as patient shared his recent thought process.  He states that his wife mention that he may have ADHD, and he discussed it with his psychiatrist, who referred him for a neuropsych evaluation.  Patient states that he has had difficulty remembering things, and often has to plan and overcompensate for a lack of time management  skills throughout his day.  Therapist discussed perfectionistic traits with patient, and provided psychoeducation on the cycle of procrastination.  Patient's symptoms of OCD seem to be causing continual intrusive thoughts, and patient states that he fears any level of distress, which makes it difficult to be vulnerable and to share his emotions.    Plan   Plan & Goals     Therapist suggested that patient utilize tools to his benefit, including making lists on his phone, writing down his thoughts rather than trying to remember all of them, and using a visual calendar to remember commitments.  We will continue to address adaptive coping mechanisms further next session, as patient's neuropsych evaluation is scheduled in a few weeks.    Patient acknowledged and verbally consented to continue working toward resolving current treatment plan goals and was educated on the importance of participation in the therapeutic process.  Patient will remain compliant with medication regimen as prescribed. Discuss any medication side effects, questions or concerns with prescribing provider.  Call 911 or present to the nearest emergency room in an emergency situation.  National Suicide Prevention Lifeline: Call 988. The Lifeline provides 24/7, free and confidential support for people in distress, prevention and crisis resources.    Return in about 2 weeks (around 9/14/2023) for Next scheduled follow up.    ____________________  This document has been electronically signed by Daniella Evans LCSW  August 31, 2023 17:06 EDT    Part of this note may be an electronic transcription/translation of spoken language to printed text using the Dragon Dictation System.

## 2023-08-29 ENCOUNTER — OFFICE VISIT (OUTPATIENT)
Dept: PSYCHIATRY | Facility: CLINIC | Age: 33
End: 2023-08-29
Payer: COMMERCIAL

## 2023-08-29 VITALS
DIASTOLIC BLOOD PRESSURE: 75 MMHG | HEART RATE: 74 BPM | HEIGHT: 68 IN | BODY MASS INDEX: 38.04 KG/M2 | WEIGHT: 251 LBS | SYSTOLIC BLOOD PRESSURE: 118 MMHG

## 2023-08-29 DIAGNOSIS — F45.21 ILLNESS ANXIETY DISORDER: ICD-10-CM

## 2023-08-29 DIAGNOSIS — F33.1 MAJOR DEPRESSIVE DISORDER, RECURRENT EPISODE, MODERATE: ICD-10-CM

## 2023-08-29 DIAGNOSIS — F42.2 MIXED OBSESSIONAL THOUGHTS AND ACTS: Primary | ICD-10-CM

## 2023-08-29 DIAGNOSIS — F41.0 PANIC ATTACKS: ICD-10-CM

## 2023-08-29 DIAGNOSIS — F51.05 INSOMNIA DUE TO MENTAL CONDITION: ICD-10-CM

## 2023-08-29 DIAGNOSIS — F41.1 GENERALIZED ANXIETY DISORDER: ICD-10-CM

## 2023-08-29 DIAGNOSIS — R41.840 ATTENTION AND CONCENTRATION DEFICIT: ICD-10-CM

## 2023-08-29 NOTE — PROGRESS NOTES
"Subjective   Hamlet Kong is a 33 y.o. male who presents today for initial evaluation     Referring Provider:  No referring provider defined for this encounter.    Chief Complaint: Anxiety and depression    History of Present Illness:     5/25: Chart review: Significant anxiety and depression.  Recently seen by primary care April 20.  Dr. Braun started the patient on Pristiq 25 mg a day along with BuSpar 10 twice daily.  Patient tried and failed Trintellix and hydroxyzine.  Also interested in psychotherapy.  Anxiety since at least 2018.  Failed Lexapro, Zoloft, vilazodone, pristiq.  PDMP is empty.  January labs show elevated glucose 179, low sodium 31, chloride 96 low, otherwise reassuring CMP, thyroid studies, lipids, iron studies, some abnormalities on the CBC, but white count, hemoglobin and hematocrit and platelets are within normal limits.  Holter monitor in October 2020 showing periods of sinus tachycardia.  January 2021: Sinus 406.  No head imaging.  Not a lot in Care Everywhere.  May have been restarted on Lexapro 10 mg a day a few days back.    \"Hamlet\"      8/29: In person interview:  Chart review:   Daniella lambert  Seen by sleep medicine.  Home sleep study in February shows mild BRET.  Recommend auto CPAP.  Planning:   Doing quite well. No changes.  Doing well at last visit.  \"doing pretty good.\"  I got a cpap machine -- definitely made a (good) difference. Would stop breathing 11-12 times a night.  \"In a pretty good spot.\"  Mood/Depression: mood got better  Anxiety: minimal  Panic attacks:  Energy: improved  Concentration: good  Sleeping: well  Eating: no weight gain, still down, might have lost more weight.  Refills: n  Substances: def  Therapy: Daniella x2  Medication compliant: y  SE: n  No SI HI AVH.      5/15: In person interview:  Chart review: Seen by sleep medicine.  Home sleep study in February shows mild BRET.  Recommend auto CPAP.  Planning: Doing well at last visit.  \"doing good.\"  My brain " "races all the time.  I got a cpap machine -- definitely made a (good) difference. Would stop breathing 11-12 times a night.  \"In a pretty good spot.\"  Mood/Depression: mood got better  Anxiety: minimal  Sometimes gets irritable  Panic attacks:  Energy: improved  Concentration:   ADHD:   Elementary school:   Grades? Good grades  Special classes or failures? n  Got in trouble? Not really  Referral for ADHD testing? n  FHx: Son has it  Presently:  Problems with attention to detail, problems with sustained attention, problems listening when spoken to directly, failure to finish tasks, avoids tasks that require sustained mental effort, easily distracted, forgetting things, losing things, hard to organize, talks a lot and cutting people off, drifts off during conversations  Sleeping: well  Eatin lbs  Refills: n  Substances: def  Therapy: n  Medication compliant: y  SE: n  No SI HI AVH.        : In person interview:  Chart review: Seen by psychotherapy, Daniella, 3 times, sleep medicine, internal medicine.  Worried about her blood.  Contamination.  He will be getting a home sleep study for hypersomnia.  Acute bacterial sinusitis, started on Augmentin.  Planning: Weight gain, possibly attributable to Wellbutrin, although it is somewhat difficult to say.  Given the timing, it is unlikely Lexapro.  Discontinue Wellbutrin and start BuSpar and monitor weight.  Patient will also keep a food diary.  Other causes should be considered as well.   \"I've lost some weight, been on a diet.\"  I've been doing pretty good.  Mood/Depression: under control  Anxiety: under control  Panic attacks: n  Energy: still tired, daytime sleepiness  Concentration: good  Sleeping: well  Eatin lb wl   Cut down on carbs  Refills: y  Substances: n  Therapy: Daniella, that's helping  Medication compliant: y  No SI HI AVH.      : In person interview:  Chart review: Continuing psychotherapy.  Seen by primary care in November for weight " "gain and snoring.  Patient and primary care feel that his weight gain is potentially due to his psychiatric medications, however weight gain with Wellbutrin is unlikely, as well with Lexapro.  Referred for sleep study.  Planning: Started Wellbutrin at the last visit to target anxiety and sexual dysfunction.  Could switch Lexapro to a different medication, like Trintellix.  Did he really fail any of the above medications?  Especially considering that he is doing well on Lexapro and Wellbutrin.  Could also discontinue Lexapro and only keep him on Wellbutrin, increasing the Wellbutrin.  \"Doing pretty good.\"  \"Pretty much back to normal\"  Sexual dysfunction is \"better... taken care of.\"  WG of 20 lbs since 8/22  Was on lexapro before May; weight was stable from July to August.  Started bupropion in August -- if anything is the culprit, it is wellbutrin. This is all the more odd because it is an appetite suppressant, but it can happen.  That being said, the wg may not even be attributable wellbutrin, but some other confounding factor  Also notes fatigue which he also attributes to low testosterone; wg not associated with low testosterone  Food diary advised  Mood/Depression: not depressed  Anxiety:  Not worrying  Panic attacks: n  Energy: good  Concentration: good  Sleeping: well, but snoring a lot  Eating: wg  Refills: y  Substances: n  Therapy: Daniella  Medication compliant: y  No SI HI AVH.      8/25: In person interview:  Chart review: No new.  Planning: Increased Lexapro from 10 to 15 mg at the last visit.  \"I feel like I'm doing much better.\"  Anxiety not as bad  Longer periods between flair ups, and flair ups aren't as bad.  Used ativan once  Sexual dysfunction: hard to orgasm  Still has some concentration issues  Mood/Depression: 1/10  Anxiety: 2 or 3/10  Panic attacks: n  Energy: good  Concentration: stable  Sleeping: well  Eating: stable  Refills: n  Substances: n  Therapy: starts 9/6  Medication compliant:  No " "JOSE CHRISTOPHER AVH.      7/8: In person interview:  Chart review: No new.  Sees Daniella for psychotherapy soon.  Planning: Started Ativan at last visit to complement Lexapro 10 mg Daily.  Consider increasing Lexapro.  \"It's been better.\"  Used ativan once, helpful.  For 2 weeks I felt great, then it came back.  Not wanting to do anything \"comes and goes\" for an hour or so  Lack of motivation  Constant worry about symptoms  Tolerating lexapro, no sexual SE.  When has intrusive thoughts:  A game he plays  Work  podcasts help  Mindfulness education today, also box breathing.  Mood/Depression: 1/10, brief episodes of anhedonia  Anxiety: 6/10, on edge  Therapy: Daniella in September  Medication compliant: y  No JOSE OVERTON.      5/26: In person.  Interview:  Chart review:   His/Her Story: \"I'm kindof a hypochondriac.\"  P10, G14  Describes obsessions that occur re: medication SE. Compulsions of reading voraciously on them over the internet.  He is always worried that he is sick.  Usually he does not have symptoms.  Sometimes he does though (somatic symptom disorder).  The anxiety gets so bad that he sometimes, rarely, has panic attacks.  He was seen in the ER last year in January for a panic attack.  Possible OCD.  Only stopped lexapro before due to sexual side effects in the past. Has never been on bupropion.  It is working quite well for him now.  \"It has calmed me down and only a week.\"  Some residual depressed mood, insomnia, feelings of guilt, poor energy and concentration related to the illness anxiety disorder.  Duration is years.  Some anxiety obviously related to the above: Uncontrolled worrying, restlessness, irritability, feeling on edge, insomnia, poor energy and concentration.  \"All that anxiety leaves me exhausted.\"  Substances: CBD Gummies he bought over-the-counter.  Has been using them for about a week.  Denies all else except social alcohol.  Therapy: Has never tried, interested  Medication compliant: Yes  Psych " ROS: Depression, anxiety, illness anxiety disorder.  Negative for psychosis and jay.  No SI HI AVH.    Access to Firearms: Denies    PHQ-9 Depression Screening  PHQ-9 Total Score:      Little interest or pleasure in doing things?     Feeling down, depressed, or hopeless?     Trouble falling or staying asleep, or sleeping too much?     Feeling tired or having little energy?     Poor appetite or overeating?     Feeling bad about yourself - or that you are a failure or have let yourself or your family down?     Trouble concentrating on things, such as reading the newspaper or watching television?     Moving or speaking so slowly that other people could have noticed? Or the opposite - being so fidgety or restless that you have been moving around a lot more than usual?     Thoughts that you would be better off dead, or of hurting yourself in some way?     PHQ-9 Total Score       CÉSAR-7       Past Surgical History:  Past Surgical History:   Procedure Laterality Date    CYST REMOVAL  2006    VASECTOMY      06/2020       Problem List:  Patient Active Problem List   Diagnosis    Gynecomastia    Ear pain, bilateral    Anxiety with depression    Class 2 obesity due to excess calories without serious comorbidity with body mass index (BMI) of 37.0 to 37.9 in adult    Fatigue    Snoring       Allergy:   No Known Allergies     Discontinued Medications:  Medications Discontinued During This Encounter   Medication Reason    LORazepam (Ativan) 0.5 MG tablet *Therapy completed    busPIRone (BUSPAR) 10 MG tablet Non-compliance       Current Medications:   Current Outpatient Medications   Medication Sig Dispense Refill    Cetirizine HCl (ZyrTEC Allergy) 10 MG capsule As Needed.      escitalopram (LEXAPRO) 20 MG tablet TAKE 1 TABLET BY MOUTH DAILY 90 tablet 1    fluticasone (FLONASE) 50 MCG/ACT nasal spray As Needed.       No current facility-administered medications for this visit.       Past Medical History:  Past Medical History:  "  Diagnosis Date    Anxiety     Depression     Panic disorder        Past Psychiatric History:  Began Treatment: From primary care, years  Diagnoses: Depression and anxiety  Psychiatrist:Ramon  Therapist: Couples therapy for marriage counseling years ago  Admission History:Denies  Medication Trials: See HPI  Self Harm: Denies  Suicide Attempts:Denies   Psychosis, Anxiety, Depression: Denies    Substance Abuse History:   Types: Social alcohol  Withdrawal Symptoms:Denies  Longest Period Sober:Not Applicable   AA: Not applicable     Social History:  Martial Status:  Employed:Yes  Kids:Yes  House:Lives in a house   History: Denies    Social History     Socioeconomic History    Marital status:    Tobacco Use    Smoking status: Never    Smokeless tobacco: Never   Vaping Use    Vaping Use: Never used   Substance and Sexual Activity    Alcohol use: Yes     Alcohol/week: 6.0 standard drinks     Types: 6 Cans of beer per week    Drug use: Never    Sexual activity: Yes     Partners: Female     Birth control/protection: Vasectomy       Family History:   Suicide Attempts: Denies  Suicide Completions:Denies      Family History   Problem Relation Age of Onset    Anxiety disorder Mother     Diabetes Father     Anxiety disorder Maternal Grandfather     Stroke Other        Developmental History:       Childhood: Deferred  High School:Completed  College: Some, he is a .  [employer].    Mental Status Exam  Appearance  : groomed, good eye contact, normal street clothes  Behavior  : pleasant and cooperative  Motor  : No abnormal  Speech  :normal rhythm, rate, volume, tone, not hyperverbal, not pressured, normal prosidy  Mood  : \"doing good... but my mind is racing\"  Affect  : euthymic, mood congruent, good variability  Thought Content  : negative suicidal ideations, negative homicidal ideations, negative obsessions  Perceptions  : negative auditory hallucinations, negative visual " "hallucinations  Thought Process  : linear  Insight/Judgement  : Fair/fair  Cognition  : grossly intact  Attention   : intact      Review of Systems:  Review of Systems   Constitutional:  Negative for diaphoresis, fatigue and fever.   HENT:  Negative for drooling.    Eyes:  Negative for visual disturbance.   Respiratory:  Negative for cough and shortness of breath.    Cardiovascular:  Negative for chest pain, palpitations and leg swelling.   Gastrointestinal:  Negative for abdominal pain, nausea and vomiting.   Endocrine: Negative for cold intolerance and heat intolerance.   Genitourinary:  Negative for difficulty urinating.   Musculoskeletal:  Negative for back pain, joint swelling and neck pain.   Allergic/Immunologic: Negative for immunocompromised state.   Neurological:  Negative for dizziness, seizures, syncope, speech difficulty, weakness, light-headedness, numbness and headaches.   Psychiatric/Behavioral:  Negative for confusion.      Physical Exam:  Physical Exam    Vital Signs:   /75   Pulse 74   Ht 172.7 cm (68\")   Wt 114 kg (251 lb)   BMI 38.16 kg/mý      Lab Results:   No visits with results within 6 Month(s) from this visit.   Latest known visit with results is:   Orders Only on 09/19/2022   Component Date Value Ref Range Status    Testosterone, Total 10/06/2022 304  264 - 916 ng/dL Final    Adult male reference interval is based on a population of  healthy nonobese males (BMI <30) between 19 and 39 years old.  Mally, et.al. JCEM 2017,102;6868-8171. PMID: 79563950.    Testosterone, Free 10/06/2022 8.4 (L)  8.7 - 25.1 pg/mL Final       EKG Results:  No orders to display       Imaging Results:  No Images in the past 120 days found..      Assessment & Plan   Diagnoses and all orders for this visit:    1. Mixed obsessional thoughts and acts (Primary)    2. Generalized anxiety disorder    3. Illness anxiety disorder    4. Panic attacks    5. Major depressive disorder, recurrent episode, " moderate    6. Insomnia due to mental condition    7. Attention and concentration deficit  -     Ambulatory Referral to Psychology        Visit Diagnoses:    ICD-10-CM ICD-9-CM   1. Mixed obsessional thoughts and acts  F42.2 300.3   2. Generalized anxiety disorder  F41.1 300.02   3. Illness anxiety disorder  F45.21 300.7   4. Panic attacks  F41.0 300.01   5. Major depressive disorder, recurrent episode, moderate  F33.1 296.32   6. Insomnia due to mental condition  F51.05 300.9     327.02   7. Attention and concentration deficit  R41.840 799.51       8/29: Dep and anx under control, son with ADHD, and ADHD s/sx. Referral to [facility] for testing.    Allowed patient to freely discuss and process issues, such as:  Anxiety related to poor focusing  ... using Rogerian psychotherapeutic techniques including unconditional positive regard, reflective listening, and demonstrating clear empathy.     Time (minutes) spent providing supportive psychotherapy: 16    Diagnoses: as above  Symptoms: as above  Functional status: mild impairment  Mental Status Exam: as above    Treatment plan: Medication management and supportive psychotherapy  Prognosis: good  Progress: stable, well  2m        5/15: Doing quite well. No changes. 7   Progress: improved, well  4 mos        2/13: Doing well, no changes. 17 Progress: stable, well  3 mos      12/13: Weight gain, possibly attributable to Wellbutrin, although it is somewhat difficult to say.  Given the timing, it is unlikely Lexapro.  Discontinue Wellbutrin and start BuSpar and monitor weight.  Patient will also keep a food diary.  Other causes should be considered as well. 17   Prognosis: good  Progress: improved  2 mos      8/25: Start bupropion to target anx and sexual dysfxn. Also concentration issues. 17 minutes of supportive psychotherapy  Treatment plan: Medication management and supportive psychotherapy  Prognosis: good  Progress: getting better  6 wks      7/8: Increase lexapro. 16  minutes of supportive psychotherapy   Treatment plan: Medication management and supportive psychotherapy  Prognosis: good  Progress: better  6 wks      5/26: Illness anxiety disorder, possible somatic symptom disorder.  Rule out OCD.  Residual depression and anxiety as well.  Continue Lexapro and start Ativan as needed.  Referral to therapy.  Consider Luvox or clomipramine for OCD.  Also high dose fluoxetine. 17 minutes of supportive psychotherapy   Treatment plan: Medication management and supportive psychotherapy  Prognosis: Good  Progress: First visit  6 weeks    PLAN:  Safety: No acute safety concerns  Therapy: Referral Made  Risk Assessment: Risk of self-harm acutely is moderate.  Risk factors include anxiety disorder, mood disorder, and recent psychosocial stressors (pandemic). Protective factors include no family history, no access to weapons, no present SI, no history of suicide attempts or self-harm in the past, minimal AODA, healthcare seeking, future orientation, willingness to engage in care.  Risk of self-harm chronically is also moderate, but could be further elevated in the event of treatment noncompliance and/or AODA.  Meds:  CONTINUE Lexapro 10 (not 20) mg daily.Risks, benefits, alternatives discussed with patient including GI upset, nausea vomiting diarrhea, theoretical decrease of seizure threshold predisposing the patient to a slightly higher seizure risk, headaches, sexual dysfunction, serotonin syndrome, bleeding risk, increased suicidality in patients 24 years and younger.  After discussion of these risks and benefits, the patient voiced understanding and agreed to proceed.  STOP bupropion  mg daily. WG? 2/23  STOPPED buspar 10 mg po bid. Noncompliant 8/23  STOPPED Ativan 0.5 mg p.o. daily as needed anxiety. Risks, benefits, alternatives discussed with patient including GI upset, sedation, dizziness, respiratory depression, falls risk.  After discussion of these risks and benefits, the  patient voiced understanding and agreed to proceed. Cathy reviewed. UDS ordered.  CSA signed.  Labs: UDS never got    Patient screened positive for depression based on a PHQ-9 score of  on . Follow-up recommendations include: Prescribed antidepressant medication treatment.           TREATMENT PLAN/GOALS: Continue supportive psychotherapy efforts and medications as indicated. Treatment and medication options discussed during today's visit. Patient acknowledged and verbally consented to continue with current treatment plan and was educated on the importance of compliance with treatment and follow-up appointments.    MEDICATION ISSUES:  CATHY reviewed as expected.  Discussed medication options and treatment plan of prescribed medication as well as the risks, benefits, and side effects including potential falls, possible impaired driving and metabolic adversities among others. Patient is agreeable to call the office with any worsening of symptoms or onset of side effects. Patient is agreeable to call 911 or go to the nearest ER should he/she begin having SI/HI. No medication side effects or related complaints today.     MEDS ORDERED DURING VISIT:  No orders of the defined types were placed in this encounter.      Return in about 2 months (around 10/29/2023).         This document has been electronically signed by José Amaya MD  August 29, 2023 16:51 EDT        Part of this note may be an electronic transcription/translation of spoken language to printed text using the Dragon Dictation System.

## 2023-08-29 NOTE — PATIENT INSTRUCTIONS
1.  Please return to clinic at your next scheduled visit.  Contact the clinic ([phone]) at least 24 hours prior in the event you need to cancel.  2.  Do no harm to yourself or others.    3.  Avoid alcohol and drugs.    4.  Take all medications as prescribed.  Please contact the clinic with any concerns. If you are in need of medication refills, please call the clinic at [phone].    5. Should you want to get in touch with your provider, Dr. José Amaya, please utilize [employer] or contact the office ([phone]), and staff will be able to page Dr. Amaya directly.  6.  In the event you have personal crisis, contact the following crisis numbers: Suicide Prevention Hotline 1-678.245.2659; MICAH Helpline 4-732-279-MICAH; [facility] Emergency Room [phone]; text HELLO to 042863; or 447.

## 2023-08-31 ENCOUNTER — OFFICE VISIT (OUTPATIENT)
Dept: PSYCHIATRY | Facility: CLINIC | Age: 33
End: 2023-08-31
Payer: COMMERCIAL

## 2023-08-31 DIAGNOSIS — F41.0 GENERALIZED ANXIETY DISORDER WITH PANIC ATTACKS: ICD-10-CM

## 2023-08-31 DIAGNOSIS — F41.1 GENERALIZED ANXIETY DISORDER WITH PANIC ATTACKS: ICD-10-CM

## 2023-08-31 DIAGNOSIS — F42.2 MIXED OBSESSIONAL THOUGHTS AND ACTS: Primary | ICD-10-CM

## 2023-08-31 NOTE — PSYCHOTHERAPY NOTE
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 Am I going to hurt my kids? Am I going to hurt myself?  COMPULSION - mental ritual, reassurance \"I would never do that\"     DISEASE PARK  TRIGGER - movies about any disease (or depression) - I lock on to the symptoms of the characters - if they have a symptom, I have to make sure I don't have that symptom  OBSESSION - Do I have depression?  COMPULSION - mental ritual, reassurance     MORALITY PARK  TRIGGER - argument or disagreement with wife (turns into anxiety), I don't argue - I become very passive, I won't talk a lot  OBSESSION - Am I doing the right thing? I'll never do anything right  COMPULSION - sit by myself and be quiet, relax, calm everything, recap the conversation with her  FEARED CONSEQUENCE - wife leaving, taking kids, splitting custody - alone (more distress, trying to avoid it)     I don't like confrontation at all - I don't like to bring up anything confrontational  I don't like making people - fear abandonment (worse after wife cheated in 2020)  ________________     10 - son - Jamel - (jealousy of 7 year old)   7 - daughter (jealousy of 2 year old)  2 - daughter (can play well " with 10 year old)  _________________    My wife notice that I might have ADHD  My brain thinks a lot - constantly thinking all the time  I forget things easily  My mind gets distracted easily  Starting things and not finishing them  Everything has to be at a certain time, I have to have plenty of time to adjust for an appointment  I want to leave early to get there    Demetra will tell me something and she said I forget a lot

## 2023-09-04 ENCOUNTER — PATIENT MESSAGE (OUTPATIENT)
Dept: INTERNAL MEDICINE | Facility: CLINIC | Age: 33
End: 2023-09-04
Payer: COMMERCIAL

## 2023-09-07 NOTE — TELEPHONE ENCOUNTER
Pt would like to try weight loss medication, please advise on what we would or would not be able to send in for them

## 2023-09-12 RX ORDER — SEMAGLUTIDE 0.25 MG/.5ML
0.25 INJECTION, SOLUTION SUBCUTANEOUS WEEKLY
Qty: 2.5 ML | Refills: 1 | Status: SHIPPED | OUTPATIENT
Start: 2023-09-12

## 2023-10-15 ENCOUNTER — PATIENT MESSAGE (OUTPATIENT)
Dept: INTERNAL MEDICINE | Facility: CLINIC | Age: 33
End: 2023-10-15
Payer: COMMERCIAL

## 2023-10-18 NOTE — TELEPHONE ENCOUNTER
From: Hamlet Kong  To: Mily Braun  Sent: 10/15/2023 3:53 PM EDT  Subject: Wegovy    Carlosy im sorry im just responding to the message you sent me a month ago. I called [facility] about the wegovy but they said its on backorder and theyve been waiting forever for it. Is there anything else you would recommend?

## 2023-11-02 ENCOUNTER — LAB (OUTPATIENT)
Dept: LAB | Facility: HOSPITAL | Age: 33
End: 2023-11-02
Payer: COMMERCIAL

## 2023-11-02 DIAGNOSIS — R53.83 FATIGUE, UNSPECIFIED TYPE: ICD-10-CM

## 2023-11-02 PROCEDURE — 36415 COLL VENOUS BLD VENIPUNCTURE: CPT

## 2023-11-02 PROCEDURE — 84403 ASSAY OF TOTAL TESTOSTERONE: CPT

## 2023-11-02 PROCEDURE — 84402 ASSAY OF FREE TESTOSTERONE: CPT

## 2023-11-03 ENCOUNTER — TELEMEDICINE (OUTPATIENT)
Dept: PSYCHIATRY | Facility: CLINIC | Age: 33
End: 2023-11-03
Payer: COMMERCIAL

## 2023-11-03 DIAGNOSIS — F41.0 GENERALIZED ANXIETY DISORDER WITH PANIC ATTACKS: ICD-10-CM

## 2023-11-03 DIAGNOSIS — R41.840 ATTENTION AND CONCENTRATION DEFICIT: ICD-10-CM

## 2023-11-03 DIAGNOSIS — F41.1 GENERALIZED ANXIETY DISORDER WITH PANIC ATTACKS: ICD-10-CM

## 2023-11-03 DIAGNOSIS — F33.40 RECURRENT MAJOR DEPRESSIVE DISORDER IN REMISSION: ICD-10-CM

## 2023-11-03 DIAGNOSIS — F41.0 PANIC ATTACKS: ICD-10-CM

## 2023-11-03 DIAGNOSIS — F42.2 MIXED OBSESSIONAL THOUGHTS AND ACTS: Primary | ICD-10-CM

## 2023-11-03 DIAGNOSIS — F45.21 ILLNESS ANXIETY DISORDER: ICD-10-CM

## 2023-11-03 RX ORDER — KETOCONAZOLE 20 MG/ML
SHAMPOO TOPICAL
COMMUNITY
Start: 2023-10-19

## 2023-11-03 RX ORDER — KETOCONAZOLE 20 MG/G
CREAM TOPICAL
COMMUNITY
Start: 2023-10-19

## 2023-11-03 NOTE — PROGRESS NOTES
"Subjective   Hamlet Kong is a 33 y.o. male who presents today for initial evaluation     Referring Provider:  No referring provider defined for this encounter.    Chief Complaint: Anxiety and depression    History of Present Illness:     : Chart review: Significant anxiety and depression.  Recently seen by primary care .  Dr. Braun started the patient on Pristiq 25 mg a day along with BuSpar 10 twice daily.  Patient tried and failed Trintellix and hydroxyzine.  Also interested in psychotherapy.  Anxiety since at least .  Failed Lexapro, Zoloft, vilazodone, pristiq.  PDMP is empty.  January labs show elevated glucose 179, low sodium 31, chloride 96 low, otherwise reassuring CMP, thyroid studies, lipids, iron studies, some abnormalities on the CBC, but white count, hemoglobin and hematocrit and platelets are within normal limits.  Holter monitor in 2020 showing periods of sinus tachycardia.  2021: Sinus 406.  No head imaging.  Not a lot in Care Everywhere.  May have been restarted on Lexapro 10 mg a day a few days back.    \"Hamlet\"      11/3: In person interview:  Chart review:   Daniella x1  Daniella x2  Seen by sleep medicine.  Home sleep study in February shows mild BRET.  Recommend auto CPAP.  Plannin/29: Dep and anx under control, son with ADHD, and ADHD s/sx. Referral to [facility] for testing.  Doing quite well. No changes.  Doing well at last visit.  \"Doing good.\"  Doing well. No complaints or issues.  Mood/Depression: mood is good  Anxiety: minimal  Panic attacks:  Energy: stable, well  Concentration: good  Sleeping: well  Eatin lbs  Refills: n  Substances: def  Therapy: Daniella  Medication compliant: y  SE: n  No SI HI AVH.      : In person interview:  Chart review:   Daniella x2  Seen by sleep medicine.  Home sleep study in February shows mild BRET.  Recommend auto CPAP.  Planning:   Doing quite well. No changes.  Doing well at last visit.  \"doing pretty " "good.\"  I got a cpap machine -- definitely made a (good) difference. Would stop breathing 11-12 times a night.  \"In a pretty good spot.\"  Mood/Depression: mood got better  Anxiety: minimal  Panic attacks:  Energy: improved  Concentration: good  Sleeping: well  Eating: no weight gain, still down, might have lost more weight.  Refills: n  Substances: def  Therapy: Daniella x2  Medication compliant: y  SE: n  No SI HI AVH.      5/15: In person interview:  Chart review: Seen by sleep medicine.  Home sleep study in February shows mild BRET.  Recommend auto CPAP.  Planning: Doing well at last visit.  \"doing good.\"  My brain races all the time.  I got a cpap machine -- definitely made a (good) difference. Would stop breathing 11-12 times a night.  \"In a pretty good spot.\"  Mood/Depression: mood got better  Anxiety: minimal  Sometimes gets irritable  Panic attacks:  Energy: improved  Concentration:   ADHD:   Elementary school:   Grades? Good grades  Special classes or failures? n  Got in trouble? Not really  Referral for ADHD testing? n  FHx: Son has it  Presently:  Problems with attention to detail, problems with sustained attention, problems listening when spoken to directly, failure to finish tasks, avoids tasks that require sustained mental effort, easily distracted, forgetting things, losing things, hard to organize, talks a lot and cutting people off, drifts off during conversations  Sleeping: well  Eatin lbs  Refills: n  Substances: def  Therapy: n  Medication compliant: y  SE: n  No SI HI AVH.        : In person interview:  Chart review: Seen by Daniella ochoa, 3 times, sleep medicine, internal medicine.  Worried about her blood.  Contamination.  He will be getting a home sleep study for hypersomnia.  Acute bacterial sinusitis, started on Augmentin.  Planning: Weight gain, possibly attributable to Wellbutrin, although it is somewhat difficult to say.  Given the timing, it is unlikely Lexapro.  " "Discontinue Wellbutrin and start BuSpar and monitor weight.  Patient will also keep a food diary.  Other causes should be considered as well.   \"I've lost some weight, been on a diet.\"  I've been doing pretty good.  Mood/Depression: under control  Anxiety: under control  Panic attacks: n  Energy: still tired, daytime sleepiness  Concentration: good  Sleeping: well  Eatin lb wl   Cut down on carbs  Refills: y  Substances: n  Therapy: Daniella, that's helping  Medication compliant: y  No SI HI AVH.    ...    21: In person.  Interview:  Chart review:   His/Her Story: \"I'm kindof a hypochondriac.\"  P10, G14  Describes obsessions that occur re: medication SE. Compulsions of reading voraciously on them over the internet.  He is always worried that he is sick.  Usually he does not have symptoms.  Sometimes he does though (somatic symptom disorder).  The anxiety gets so bad that he sometimes, rarely, has panic attacks.  He was seen in the ER last year in January for a panic attack.  Possible OCD.  Only stopped lexapro before due to sexual side effects in the past. Has never been on bupropion.  It is working quite well for him now.  \"It has calmed me down and only a week.\"  Some residual depressed mood, insomnia, feelings of guilt, poor energy and concentration related to the illness anxiety disorder.  Duration is years.  Some anxiety obviously related to the above: Uncontrolled worrying, restlessness, irritability, feeling on edge, insomnia, poor energy and concentration.  \"All that anxiety leaves me exhausted.\"  Substances: CBD Gummies he bought over-the-counter.  Has been using them for about a week.  Denies all else except social alcohol.  Therapy: Has never tried, interested  Medication compliant: Yes  Psych ROS: Depression, anxiety, illness anxiety disorder.  Negative for psychosis and jay.  No SI HI AVH.    Access to Firearms: Denies    PHQ-9 Depression Screening  PHQ-9 Total Score:      Little interest " or pleasure in doing things?     Feeling down, depressed, or hopeless?     Trouble falling or staying asleep, or sleeping too much?     Feeling tired or having little energy?     Poor appetite or overeating?     Feeling bad about yourself - or that you are a failure or have let yourself or your family down?     Trouble concentrating on things, such as reading the newspaper or watching television?     Moving or speaking so slowly that other people could have noticed? Or the opposite - being so fidgety or restless that you have been moving around a lot more than usual?     Thoughts that you would be better off dead, or of hurting yourself in some way?     PHQ-9 Total Score       CÉSAR-7       Past Surgical History:  Past Surgical History:   Procedure Laterality Date    CYST REMOVAL  2006    VASECTOMY      06/2020       Problem List:  Patient Active Problem List   Diagnosis    Gynecomastia    Ear pain, bilateral    Anxiety with depression    Class 2 obesity due to excess calories without serious comorbidity with body mass index (BMI) of 37.0 to 37.9 in adult    Fatigue    Snoring       Allergy:   No Known Allergies     Discontinued Medications:  There are no discontinued medications.      Current Medications:   Current Outpatient Medications   Medication Sig Dispense Refill    ketoconazole (NIZORAL) 2 % cream APPLY TOPICALLY TO FEET TWICE DAILY FOR 6 TO 8 WEEKS      ketoconazole (NIZORAL) 2 % shampoo APPLY SHAMPOO AND LEAVE ON FOR 5 TO 10 MINUTES THEN RINSE OFF      Cetirizine HCl (ZyrTEC Allergy) 10 MG capsule As Needed.      escitalopram (LEXAPRO) 20 MG tablet TAKE 1 TABLET BY MOUTH DAILY 90 tablet 1    fluticasone (FLONASE) 50 MCG/ACT nasal spray As Needed.      Semaglutide-Weight Management (Wegovy) 0.25 MG/0.5ML solution auto-injector Inject 0.25 mg under the skin into the appropriate area as directed 1 (One) Time Per Week. 2.5 mL 1     No current facility-administered medications for this visit.       Past Medical  "History:  Past Medical History:   Diagnosis Date    Anxiety     Depression     Panic disorder        Past Psychiatric History:  Began Treatment: From primary care, years  Diagnoses: Depression and anxiety  Psychiatrist:Denies  Therapist: Couples therapy for marriage counseling years ago  Admission History:Denies  Medication Trials: See HPI  Self Harm: Denies  Suicide Attempts:Denies   Psychosis, Anxiety, Depression: Denies    Substance Abuse History:   Types: Social alcohol  Withdrawal Symptoms:Denies  Longest Period Sober:Not Applicable   AA: Not applicable     Social History:  Martial Status:  Employed:Yes  Kids:Yes  House:Lives in a house   History: Denies    Social History     Socioeconomic History    Marital status:    Tobacco Use    Smoking status: Never    Smokeless tobacco: Never   Vaping Use    Vaping Use: Never used   Substance and Sexual Activity    Alcohol use: Yes     Alcohol/week: 6.0 standard drinks of alcohol     Types: 6 Cans of beer per week    Drug use: Never    Sexual activity: Yes     Partners: Female     Birth control/protection: Vasectomy       Family History:   Suicide Attempts: Denies  Suicide Completions:Denies      Family History   Problem Relation Age of Onset    Anxiety disorder Mother     Diabetes Father     Anxiety disorder Maternal Grandfather     Stroke Other        Developmental History:       Childhood: Deferred  High School:Completed  College: Some, he is a .  [employer].    Mental Status Exam  Appearance  : groomed, good eye contact, normal street clothes  Behavior  : pleasant and cooperative  Motor  : No abnormal  Speech  :normal rhythm, rate, volume, tone, not hyperverbal, not pressured, normal prosidy  Mood  : \"doing good, no issues\"  Affect  : euthymic, mood congruent, good variability  Thought Content  : negative suicidal ideations, negative homicidal ideations, negative obsessions  Perceptions  : negative auditory hallucinations, " negative visual hallucinations  Thought Process  : linear  Insight/Judgement  : Fair/fair  Cognition  : grossly intact  Attention   : intact      Review of Systems:  Review of Systems   Constitutional:  Negative for diaphoresis, fatigue and fever.   HENT:  Negative for drooling.    Eyes:  Negative for visual disturbance.   Respiratory:  Negative for cough and shortness of breath.    Cardiovascular:  Negative for chest pain, palpitations and leg swelling.   Gastrointestinal:  Negative for abdominal pain, nausea and vomiting.   Endocrine: Negative for cold intolerance and heat intolerance.   Genitourinary:  Negative for difficulty urinating.   Musculoskeletal:  Negative for back pain, joint swelling and neck pain.   Allergic/Immunologic: Negative for immunocompromised state.   Neurological:  Negative for dizziness, seizures, syncope, speech difficulty, weakness, light-headedness, numbness and headaches.   Psychiatric/Behavioral:  Negative for confusion.        Physical Exam:  Physical Exam    Vital Signs:   There were no vitals taken for this visit.     Lab Results:   No visits with results within 6 Month(s) from this visit.   Latest known visit with results is:   Orders Only on 09/19/2022   Component Date Value Ref Range Status    Testosterone, Total 10/06/2022 304  264 - 916 ng/dL Final    Adult male reference interval is based on a population of  healthy nonobese males (BMI <30) between 19 and 39 years old.  Mally, et.al. JCEM 2017,102;9512-2113. PMID: 54119277.    Testosterone, Free 10/06/2022 8.4 (L)  8.7 - 25.1 pg/mL Final       EKG Results:  No orders to display       Imaging Results:  No Images in the past 120 days found..      Assessment & Plan   Diagnoses and all orders for this visit:    1. Mixed obsessional thoughts and acts (Primary)    2. Generalized anxiety disorder with panic attacks    3. Illness anxiety disorder    4. Panic attacks    5. Recurrent major depressive disorder in remission    6.  Attention and concentration deficit        Visit Diagnoses:    ICD-10-CM ICD-9-CM   1. Mixed obsessional thoughts and acts  F42.2 300.3   2. Generalized anxiety disorder with panic attacks  F41.1 300.02    F41.0 300.01   3. Illness anxiety disorder  F45.21 300.7   4. Panic attacks  F41.0 300.01   5. Recurrent major depressive disorder in remission  F33.40 296.35   6. Attention and concentration deficit  R41.840 799.51     11/3: Stable, well, no changes. Follow up on testing.    Allowed patient to freely discuss and process issues, such as:  Anxiety related to poor focusing  ... using Rogerian psychotherapeutic techniques including unconditional positive regard, reflective listening, and demonstrating clear empathy.     Time (minutes) spent providing supportive psychotherapy: 6  Functional status: mild impairment  Treatment plan: Medication management and supportive psychotherapy  Prognosis: good  Progress: stable, well  6m    8/29: Dep and anx under control, son with ADHD, and ADHD s/sx. Referral to [facility] for testing.    5/15: Doing quite well. No changes.     2/13: Doing well, no changes.     12/13: Weight gain, possibly attributable to Wellbutrin, although it is somewhat difficult to say.  Given the timing, it is unlikely Lexapro.  Discontinue Wellbutrin and start BuSpar and monitor weight.  Patient will also keep a food diary.  Other causes should be considered as well.     8/25: Start bupropion to target anx and sexual dysfxn. Also concentration issues.     7/8: Increase lexapro.     5/26: Illness anxiety disorder, possible somatic symptom disorder.  Rule out OCD.  Residual depression and anxiety as well.  Continue Lexapro and start Ativan as needed.  Referral to therapy.  Consider Luvox or clomipramine for OCD.  Also high dose fluoxetine.     PLAN:  Safety: No acute safety concerns  Therapy: Referral Made  Risk Assessment: Risk of self-harm acutely is moderate.  Risk factors include anxiety disorder, mood  disorder, and recent psychosocial stressors (pandemic). Protective factors include no family history, no access to weapons, no present SI, no history of suicide attempts or self-harm in the past, minimal AODA, healthcare seeking, future orientation, willingness to engage in care.  Risk of self-harm chronically is also moderate, but could be further elevated in the event of treatment noncompliance and/or AODA.  Meds:  CONTINUE Lexapro 10 (not 20) mg daily.Risks, benefits, alternatives discussed with patient including GI upset, nausea vomiting diarrhea, theoretical decrease of seizure threshold predisposing the patient to a slightly higher seizure risk, headaches, sexual dysfunction, serotonin syndrome, bleeding risk, increased suicidality in patients 24 years and younger.  After discussion of these risks and benefits, the patient voiced understanding and agreed to proceed.  S/P:  bupropion  mg daily. WG? 2/23  buspar 10 mg po bid. Noncompliant 8/23  Ativan 0.5 mg p.o. daily as needed anxiety. Not needed. 10/23  Labs: UDS never got    Patient screened positive for depression based on a PHQ-9 score of  on . Follow-up recommendations include: Prescribed antidepressant medication treatment.           TREATMENT PLAN/GOALS: Continue supportive psychotherapy efforts and medications as indicated. Treatment and medication options discussed during today's visit. Patient acknowledged and verbally consented to continue with current treatment plan and was educated on the importance of compliance with treatment and follow-up appointments.    MEDICATION ISSUES:  CATHY reviewed as expected.  Discussed medication options and treatment plan of prescribed medication as well as the risks, benefits, and side effects including potential falls, possible impaired driving and metabolic adversities among others. Patient is agreeable to call the office with any worsening of symptoms or onset of side effects. Patient is agreeable to call  911 or go to the nearest ER should he/she begin having SI/HI. No medication side effects or related complaints today.     MEDS ORDERED DURING VISIT:  No orders of the defined types were placed in this encounter.      Return in about 6 months (around 5/3/2024).         This document has been electronically signed by José Amaya MD  November 3, 2023 16:25 EDT        Part of this note may be an electronic transcription/translation of spoken language to printed text using the Dragon Dictation System.

## 2023-11-03 NOTE — PATIENT INSTRUCTIONS
1.  Please return to clinic at your next scheduled visit.  Contact the clinic ([phone]) at least 24 hours prior in the event you need to cancel.  2.  Do no harm to yourself or others.    3.  Avoid alcohol and drugs.    4.  Take all medications as prescribed.  Please contact the clinic with any concerns. If you are in need of medication refills, please call the clinic at [phone].    5. Should you want to get in touch with your provider, Dr. José Amaya, please utilize [employer] or contact the office ([phone]), and staff will be able to page Dr. Amaya directly.  6.  In the event you have personal crisis, contact the following crisis numbers: Suicide Prevention Hotline 1-130.647.9165; MICAH Helpline 4-806-291-MICAH; [facility] [phone]; text HELLO to 933840; or 760.

## 2023-11-08 LAB
TESTOST FREE SERPL-MCNC: 7.3 PG/ML (ref 8.7–25.1)
TESTOST SERPL-MCNC: 275 NG/DL (ref 264–916)

## 2023-11-16 ENCOUNTER — OFFICE VISIT (OUTPATIENT)
Dept: INTERNAL MEDICINE | Facility: CLINIC | Age: 33
End: 2023-11-16
Payer: COMMERCIAL

## 2023-11-16 VITALS
HEART RATE: 91 BPM | TEMPERATURE: 97.1 F | BODY MASS INDEX: 39.65 KG/M2 | SYSTOLIC BLOOD PRESSURE: 116 MMHG | OXYGEN SATURATION: 97 % | DIASTOLIC BLOOD PRESSURE: 74 MMHG | WEIGHT: 261.6 LBS | HEIGHT: 68 IN | RESPIRATION RATE: 16 BRPM

## 2023-11-16 DIAGNOSIS — J40 BRONCHITIS: ICD-10-CM

## 2023-11-16 DIAGNOSIS — R63.5 WEIGHT GAIN: ICD-10-CM

## 2023-11-16 DIAGNOSIS — J01.90 ACUTE BACTERIAL RHINOSINUSITIS: Primary | ICD-10-CM

## 2023-11-16 DIAGNOSIS — B96.89 ACUTE BACTERIAL RHINOSINUSITIS: Primary | ICD-10-CM

## 2023-11-16 PROCEDURE — 99213 OFFICE O/P EST LOW 20 MIN: CPT | Performed by: NURSE PRACTITIONER

## 2023-11-16 RX ORDER — AMOXICILLIN AND CLAVULANATE POTASSIUM 875; 125 MG/1; MG/1
1 TABLET, FILM COATED ORAL 2 TIMES DAILY
Qty: 20 TABLET | Refills: 0 | Status: SHIPPED | OUTPATIENT
Start: 2023-11-16 | End: 2023-11-26

## 2023-11-16 RX ORDER — PREDNISONE 20 MG/1
40 TABLET ORAL DAILY
Qty: 10 TABLET | Refills: 0 | Status: SHIPPED | OUTPATIENT
Start: 2023-11-16

## 2023-11-16 RX ORDER — BROMPHENIRAMINE MALEATE, PSEUDOEPHEDRINE HYDROCHLORIDE, AND DEXTROMETHORPHAN HYDROBROMIDE 2; 30; 10 MG/5ML; MG/5ML; MG/5ML
10 SYRUP ORAL 4 TIMES DAILY PRN
Qty: 240 ML | Refills: 0 | Status: SHIPPED | OUTPATIENT
Start: 2023-11-16

## 2023-11-16 NOTE — PROGRESS NOTES
"Chief Complaint  Cough (Had cough for a week )    Subjective        Hamlet Kong presents to [facility] for concern for cough, congestion, wheezing.  Ongoing for nearly 10 days.  Using [place] over-the-counter, which is somewhat helpful.  Denies any fever, chills.  No nausea vomiting diarrhea.    Objective   Vital Signs:   /74 (BP Location: Left arm, Patient Position: Sitting, Cuff Size: Large Adult)   Pulse 91   Temp 97.1 °F (36.2 °C) (Temporal)   Resp 16   Ht 172.7 cm (67.99\")   Wt 119 kg (261 lb 9.6 oz)   SpO2 97%   BMI 39.79 kg/m²     Physical Exam  Vitals and nursing note reviewed.   Constitutional:       Appearance: Normal appearance.   HENT:      Head: Normocephalic and atraumatic.      Right Ear: Tympanic membrane, ear canal and external ear normal.      Left Ear: Tympanic membrane, ear canal and external ear normal.      Nose: Congestion present.      Mouth/Throat:      Mouth: Mucous membranes are moist.      Pharynx: Oropharynx is clear.   Eyes:      Conjunctiva/sclera: Conjunctivae normal.      Pupils: Pupils are equal, round, and reactive to light.   Cardiovascular:      Rate and Rhythm: Normal rate and regular rhythm.   Pulmonary:      Effort: Pulmonary effort is normal.      Breath sounds: Normal breath sounds.   Skin:     Capillary Refill: Capillary refill takes less than 2 seconds.   Neurological:      Mental Status: He is alert.        Result Review :  {The following data was reviewed by LUCILA Aguilar on 11/16/23                Diagnoses and all orders for this visit:    1. Acute bacterial rhinosinusitis (Primary)    2. Bronchitis    3. Weight gain  -     Testosterone    Other orders  -     amoxicillin-clavulanate (AUGMENTIN) 875-125 MG per tablet; Take 1 tablet by mouth 2 (Two) Times a Day for 10 days.  Dispense: 20 tablet; Refill: 0  -     predniSONE (DELTASONE) 20 MG tablet; Take 2 tablets by mouth Daily.  Dispense: 10 tablet; Refill: " 0  -     brompheniramine-pseudoephedrine-DM 30-2-10 MG/5ML syrup; Take 10 mL by mouth 4 (Four) Times a Day As Needed for Allergies.  Dispense: 240 mL; Refill: 0    Symptoms, exam consistent with bacterial rhinosinusitis and bronchitis.  Will treat with Augmentin, short course of steroids, Bromfed for symptoms.  Discussed appropriate use of medications.  Can follow-up as needed if symptoms do not improve.    Reviewed testosterone levels.  Recommend completing another testosterone level, will set up to complete in the morning, between 8 and 10 AM.      Follow Up   No follow-ups on file.  Patient was given instructions and counseling regarding his condition or for health maintenance advice. Please see specific information pulled into the AVS if appropriate.     Rah Valdivia, LUCILA  11/16/2023  This note was electronically signed.

## 2024-01-22 ENCOUNTER — HOSPITAL ENCOUNTER (OUTPATIENT)
Dept: ULTRASOUND IMAGING | Facility: HOSPITAL | Age: 34
Discharge: HOME | End: 2024-01-22
Admitting: NURSE PRACTITIONER
Payer: COMMERCIAL

## 2024-01-22 DIAGNOSIS — Z98.52 HISTORY OF VASECTOMY: ICD-10-CM

## 2024-01-22 DIAGNOSIS — N50.82 SCROTAL PAIN: Primary | ICD-10-CM

## 2024-01-22 PROCEDURE — 76870 US EXAM SCROTUM: CPT

## 2024-01-22 RX ORDER — SULFAMETHOXAZOLE AND TRIMETHOPRIM 800; 160 MG/1; MG/1
1 TABLET ORAL 2 TIMES DAILY
Qty: 20 TABLET | Refills: 0 | Status: SHIPPED | OUTPATIENT
Start: 2024-01-22

## 2024-01-29 PROBLEM — N50.82 SCROTAL PAIN: Status: ACTIVE | Noted: 2024-01-29

## 2024-02-05 NOTE — PROGRESS NOTES
Chief Complaint: Urologic complaint    Subjective         History of Present Illness  Hamlet Kong is a 33 y.o. male       Scrotal pain  History of epididymitis      No pain unless he has epididymitis.    No pain today.    Twice in the last 6 months    Usually gets better with antibiotics, he does 10 days through PCP    Patient is had 4 episodes of epididymitis on the right side since vasectomy.    Never had epididymitis before this.      1/22/2024 scrotal ultrasound - normal testicles, right epididymitis    7/21  NP -acute epididymitis treated with doxycycline    2020 vasectomy - Sukhjinder-postprocedural hematoma resolved spontaneously      No GH    Voiding okay       No urologic family history,      No cardiopulmonary history   Non-smoker.  No anticoagulation          Objective     Past Medical History:   Diagnosis Date    Anxiety     Depression     Panic disorder        Past Surgical History:   Procedure Laterality Date    CYST REMOVAL  2006    VASECTOMY      06/2020         Current Outpatient Medications:     brompheniramine-pseudoephedrine-DM 30-2-10 MG/5ML syrup, Take 10 mL by mouth 4 (Four) Times a Day As Needed for Allergies., Disp: 240 mL, Rfl: 0    Cetirizine HCl (ZyrTEC Allergy) 10 MG capsule, As Needed. (Patient not taking: Reported on 11/16/2023), Disp: , Rfl:     escitalopram (LEXAPRO) 20 MG tablet, TAKE 1 TABLET BY MOUTH DAILY, Disp: 90 tablet, Rfl: 1    fluticasone (FLONASE) 50 MCG/ACT nasal spray, As Needed. (Patient not taking: Reported on 11/16/2023), Disp: , Rfl:     ketoconazole (NIZORAL) 2 % cream, APPLY TOPICALLY TO FEET TWICE DAILY FOR 6 TO 8 WEEKS, Disp: , Rfl:     ketoconazole (NIZORAL) 2 % shampoo, APPLY SHAMPOO AND LEAVE ON FOR 5 TO 10 MINUTES THEN RINSE OFF, Disp: , Rfl:     predniSONE (DELTASONE) 20 MG tablet, Take 2 tablets by mouth Daily., Disp: 10 tablet, Rfl: 0    Semaglutide-Weight Management (Wegovy) 0.25 MG/0.5ML solution auto-injector, Inject 0.25 mg under the skin into  the appropriate area as directed 1 (One) Time Per Week. (Patient not taking: Reported on 11/16/2023), Disp: 2.5 mL, Rfl: 1    sulfamethoxazole-trimethoprim (Bactrim DS) 800-160 MG per tablet, Take 1 tablet by mouth 2 (Two) Times a Day., Disp: 20 tablet, Rfl: 0    No Known Allergies     Family History   Problem Relation Age of Onset    Anxiety disorder Mother     Diabetes Father     Anxiety disorder Maternal Grandfather     Stroke Other        Social History     Socioeconomic History    Marital status:    Tobacco Use    Smoking status: Never    Smokeless tobacco: Never   Vaping Use    Vaping Use: Never used   Substance and Sexual Activity    Alcohol use: Yes     Alcohol/week: 6.0 standard drinks of alcohol     Types: 6 Cans of beer per week    Drug use: Never    Sexual activity: Yes     Partners: Female     Birth control/protection: Vasectomy       Vital Signs:   There were no vitals taken for this visit.     Physical exam    Alert and orient x3  Well appearing, well developed, in no acute distress   Unlabored respirations  Nontender/nondistended      Grossly oriented to person, place and time, judgment is intact, normal mood and affect    Results for orders placed or performed in visit on 11/02/23   Testosterone, Free, Total    Specimen: Blood   Result Value Ref Range    Testosterone, Total 275 264 - 916 ng/dL    Testosterone, Free 7.3 (L) 8.7 - 25.1 pg/mL             Assessment and Plan    Diagnoses and all orders for this visit:    1. Scrotal pain (Primary)        Recurrent epididymitis    Records reviewed and summarized in chart.       Patient is, for course of Bactrim.  He states he is still intermittently tender at times.  To see if we can get a handle on this recurrent epididymitis will place him on Bactrim DS p.o. twice daily x 4 weeks.    Risk and benefits of antibiotics were discussed    Probiotic    BMP in 2 weeks      We did discuss if he continues to have recurrent epididymitis I would consider  second opinion       After discussion we will make no follow-up this time he will let me know if he continues to have trouble

## 2024-02-09 ENCOUNTER — OFFICE VISIT (OUTPATIENT)
Dept: UROLOGY | Facility: CLINIC | Age: 34
End: 2024-02-09
Payer: COMMERCIAL

## 2024-02-09 VITALS — HEIGHT: 68 IN | BODY MASS INDEX: 39.56 KG/M2 | RESPIRATION RATE: 17 BRPM | WEIGHT: 261 LBS

## 2024-02-09 DIAGNOSIS — N45.1 EPIDIDYMITIS: ICD-10-CM

## 2024-02-09 DIAGNOSIS — N50.82 SCROTAL PAIN: Primary | ICD-10-CM

## 2024-02-09 RX ORDER — LACTOBACILLUS ACIDOPHILUS 20B CELL
1 CAPSULE ORAL DAILY
Qty: 30 CAPSULE | Refills: 0 | Status: SHIPPED | OUTPATIENT
Start: 2024-02-09

## 2024-02-09 RX ORDER — SULFAMETHOXAZOLE AND TRIMETHOPRIM 800; 160 MG/1; MG/1
1 TABLET ORAL 2 TIMES DAILY
Qty: 60 TABLET | Refills: 0 | Status: SHIPPED | OUTPATIENT
Start: 2024-02-09 | End: 2024-03-10

## 2024-05-14 ENCOUNTER — OFFICE VISIT (OUTPATIENT)
Dept: INTERNAL MEDICINE | Facility: CLINIC | Age: 34
End: 2024-05-14
Payer: COMMERCIAL

## 2024-05-14 VITALS
DIASTOLIC BLOOD PRESSURE: 74 MMHG | TEMPERATURE: 97.8 F | OXYGEN SATURATION: 97 % | RESPIRATION RATE: 14 BRPM | SYSTOLIC BLOOD PRESSURE: 108 MMHG | WEIGHT: 250.6 LBS | BODY MASS INDEX: 37.98 KG/M2 | HEIGHT: 68 IN | HEART RATE: 75 BPM

## 2024-05-14 DIAGNOSIS — J02.8 BACTERIAL PHARYNGITIS: Primary | ICD-10-CM

## 2024-05-14 DIAGNOSIS — B96.89 BACTERIAL PHARYNGITIS: Primary | ICD-10-CM

## 2024-05-14 PROCEDURE — 99213 OFFICE O/P EST LOW 20 MIN: CPT | Performed by: NURSE PRACTITIONER

## 2024-05-14 RX ORDER — CEFDINIR 300 MG/1
300 CAPSULE ORAL 2 TIMES DAILY
Qty: 20 CAPSULE | Refills: 0 | Status: SHIPPED | OUTPATIENT
Start: 2024-05-14 | End: 2024-05-24

## 2024-05-14 NOTE — PROGRESS NOTES
"Chief Complaint  Sore Throat (Have had sore throat for about a week )    Subjective        Hamlet Kong presents to [facility] for concerns regarding sore throat.  Patient states that his throat became sore last week, he did go to urgent care, rapid strep was negative, diagnosed with pharyngitis.  Was told to use ibuprofen and Tylenol for pain management.  Patient reports that up until yesterday symptoms were very persistent, however today they are somewhat improved.    Objective   Vital Signs:   /74 (BP Location: Left arm, Patient Position: Sitting, Cuff Size: Adult)   Pulse 75   Temp 97.8 °F (36.6 °C) (Temporal)   Resp 14   Ht 172.7 cm (67.99\")   Wt 114 kg (250 lb 9.6 oz)   SpO2 97%   BMI 38.11 kg/m²     Physical Exam  Vitals and nursing note reviewed.   Constitutional:       Appearance: Normal appearance.   HENT:      Head: Normocephalic and atraumatic.      Right Ear: External ear normal.      Left Ear: External ear normal.      Nose: Nose normal.      Mouth/Throat:      Mouth: Mucous membranes are moist.      Pharynx: Posterior oropharyngeal erythema present.   Eyes:      Pupils: Pupils are equal, round, and reactive to light.   Pulmonary:      Effort: Pulmonary effort is normal.   Neurological:      Mental Status: He is alert.        Result Review :  {The following data was reviewed by LUCILA Aguilar on 05/14/24                Diagnoses and all orders for this visit:    1. Bacterial pharyngitis (Primary)    Other orders  -     cefdinir (OMNICEF) 300 MG capsule; Take 1 capsule by mouth 2 (Two) Times a Day for 10 days.  Dispense: 20 capsule; Refill: 0    Due to length of symptoms, will go ahead and prescribe cefdinir, encouraged him to monitor symptoms for the next 24 hours, if they continue to improve, he could withhold starting antibiotics, he is at the threshold where antibiotics would be deemed necessary.  He can follow-up as needed otherwise.  Can " continue with Tylenol and/or Motrin as needed for discomfort.      Follow Up   No follow-ups on file.  Patient was given instructions and counseling regarding his condition or for health maintenance advice. Please see specific information pulled into the AVS if appropriate.     Rah Valdivia, APRN  5/14/2024  This note was electronically signed.

## 2024-07-25 ENCOUNTER — DOCUMENTATION (OUTPATIENT)
Dept: PSYCHIATRY | Facility: CLINIC | Age: 34
End: 2024-07-25
Payer: COMMERCIAL

## 2024-09-02 DIAGNOSIS — F51.05 INSOMNIA DUE TO MENTAL CONDITION: ICD-10-CM

## 2024-09-02 DIAGNOSIS — F33.1 MAJOR DEPRESSIVE DISORDER, RECURRENT EPISODE, MODERATE: ICD-10-CM

## 2024-09-02 DIAGNOSIS — F41.1 GENERALIZED ANXIETY DISORDER: ICD-10-CM

## 2024-09-02 DIAGNOSIS — F45.21 ILLNESS ANXIETY DISORDER: ICD-10-CM

## 2024-09-02 DIAGNOSIS — F41.0 PANIC ATTACKS: ICD-10-CM

## 2024-09-03 RX ORDER — ESCITALOPRAM OXALATE 20 MG/1
20 TABLET ORAL DAILY
Qty: 90 TABLET | Refills: 1 | Status: SHIPPED | OUTPATIENT
Start: 2024-09-03

## 2024-09-03 NOTE — TELEPHONE ENCOUNTER
PT(PATIENT) VERIFIED     NEXT APPT WITH PROVIDER   Appointment with José Amaya MD (2024)     PLEASE ADVISE

## 2024-09-03 NOTE — TELEPHONE ENCOUNTER
NEXT VISIT WITH PROVIDER   NONE IN [facility]     LAST SEEN BY PROVIDER   Telemedicine with José Amaya MD (11/03/2023)     LAST MED REFILL  escitalopram (LEXAPRO) 20 MG tablet (08/02/2023)

## 2024-09-11 ENCOUNTER — TELEMEDICINE (OUTPATIENT)
Dept: PSYCHIATRY | Facility: CLINIC | Age: 34
End: 2024-09-11
Payer: COMMERCIAL

## 2024-09-11 DIAGNOSIS — F42.2 MIXED OBSESSIONAL THOUGHTS AND ACTS: ICD-10-CM

## 2024-09-11 DIAGNOSIS — F51.05 INSOMNIA DUE TO MENTAL CONDITION: ICD-10-CM

## 2024-09-11 DIAGNOSIS — F45.21 ILLNESS ANXIETY DISORDER: ICD-10-CM

## 2024-09-11 DIAGNOSIS — F33.40 RECURRENT MAJOR DEPRESSIVE DISORDER IN REMISSION: ICD-10-CM

## 2024-09-11 DIAGNOSIS — F41.0 PANIC ATTACKS: ICD-10-CM

## 2024-09-11 DIAGNOSIS — F41.1 GENERALIZED ANXIETY DISORDER: Primary | ICD-10-CM

## 2024-09-11 PROCEDURE — 99214 OFFICE O/P EST MOD 30 MIN: CPT | Performed by: STUDENT IN AN ORGANIZED HEALTH CARE EDUCATION/TRAINING PROGRAM

## 2024-09-11 NOTE — PATIENT INSTRUCTIONS
1.  Please return to clinic at your next scheduled visit.  Contact the clinic ([phone]) at least 24 hours prior in the event you need to cancel.  2.  Do no harm to yourself or others.    3.  Avoid alcohol and drugs.    4.  Take all medications as prescribed.  Please contact the clinic with any concerns. If you are in need of medication refills, please call the clinic at [phone].    5. Should you want to get in touch with your provider, Dr. José Amaya, please utilize [employer] or contact the office ([phone]), and staff will be able to page Dr. Amaya directly.  6.  In the event you have personal crisis, contact the following crisis numbers: Suicide Prevention Hotline 1-257.602.1417; MICAH Helpline 3-828-614-MICAH; [facility] Emergency Room [phone]; text HELLO to [phone]; or 172.

## 2024-09-11 NOTE — PROGRESS NOTES
"Subjective   Hamlet Kong is a 34 y.o. male who presents today for initial evaluation     Referring Provider:  No referring provider defined for this encounter.    Chief Complaint: Anxiety and depression    History of Present Illness:     : Chart review: Significant anxiety and depression.  Recently seen by primary care .  Dr. Braun started the patient on Pristiq 25 mg a day along with BuSpar 10 twice daily.  Patient tried and failed Trintellix and hydroxyzine.  Also interested in psychotherapy.  Anxiety since at least .  Failed Lexapro, Zoloft, vilazodone, pristiq.  PDMP is empty.  January labs show elevated glucose 179, low sodium 31, chloride 96 low, otherwise reassuring CMP, thyroid studies, lipids, iron studies, some abnormalities on the CBC, but white count, hemoglobin and hematocrit and platelets are within normal limits.  Holter monitor in 2020 showing periods of sinus tachycardia.  2021: Sinus 406.  No head imaging.  Not a lot in Care Everywhere.  May have been restarted on Lexapro 10 mg a day a few days back.      Chart review:   2024: int med, unknown, urology, no masses on testicular U/S. Does not have ADHD per neuropsych testing.  2023: Daniella x1  Daniella x2  Seen by sleep medicine.  Home sleep study in February shows mild BRET.  Recommend auto CPAP.    Plannin/3/23: Stable, well, no changes. Follow up on testing.  : Dep and anx under control, son with ADHD, and ADHD s/sx. Referral to [facility] for testing.  Doing quite well. No changes.  Doing well at last visit.      Visits (Below):  \"Hamlet\"    2024:   Virtual visit via Zoom audio and video due to the COVID-19 pandemic.  Patient is accepting of and agreeable to visit.  The visit consisted of the patient and I. Patient is at home, and I am at the office.  Interview:  \"Doing good.\"  No complaints.  Mood/Depression: MDD: stable  Anxiety: CÉSAR: stable  Panic attacks: stable  Energy: " "stable  Concentration: stable  Insomnia: stable  Eatin, 251 lbs  Refills: none  Substances: def  Therapy: Daniella, looks like may have stopped  Medication compliant: y  SE: none  No SI HI AVH.      11/3/23:   In person interview:  \"Doing good.\"  Doing well. No complaints or issues.  Mood/Depression: mood is good  Anxiety: minimal  Panic attacks:  Energy: stable, well  Concentration: good  Sleeping: well  Eatin lbs  Refills: n  Substances: def  Therapy: Daniella  Medication compliant: y  SE: n  No SI HI AVH.      : In person interview:  Chart review:   Daniella x2  Seen by sleep medicine.  Home sleep study in February shows mild BRET.  Recommend auto CPAP.  Planning:   Doing quite well. No changes.  Doing well at last visit.  \"doing pretty good.\"  I got a cpap machine -- definitely made a (good) difference. Would stop breathing 11-12 times a night.  \"In a pretty good spot.\"  Mood/Depression: mood got better  Anxiety: minimal  Panic attacks:  Energy: improved  Concentration: good  Sleeping: well  Eating: no weight gain, still down, might have lost more weight.  Refills: n  Substances: def  Therapy: Daniella x2  Medication compliant: y  SE: n  No SI HI AVH.      5/15: In person interview:  Chart review: Seen by sleep medicine.  Home sleep study in February shows mild BRET.  Recommend auto CPAP.  Planning: Doing well at last visit.  \"doing good.\"  My brain races all the time.  I got a cpap machine -- definitely made a (good) difference. Would stop breathing 11-12 times a night.  \"In a pretty good spot.\"  Mood/Depression: mood got better  Anxiety: minimal  Sometimes gets irritable  Panic attacks:  Energy: improved  Concentration:   ADHD:   Elementary school:   Grades? Good grades  Special classes or failures? n  Got in trouble? Not really  Referral for ADHD testing? n  FHx: Son has it  Presently:  Problems with attention to detail, problems with sustained attention, problems listening when spoken to directly, " "failure to finish tasks, avoids tasks that require sustained mental effort, easily distracted, forgetting things, losing things, hard to organize, talks a lot and cutting people off, drifts off during conversations  Sleeping: well  Eatin lbs  Refills: n  Substances: def  Therapy: n  Medication compliant: y  SE: n  No SI HI AVH.        : In person interview:  Chart review: Seen by psychotherapy, Daniella, 3 times, sleep medicine, internal medicine.  Worried about her blood.  Contamination.  He will be getting a home sleep study for hypersomnia.  Acute bacterial sinusitis, started on Augmentin.  Planning: Weight gain, possibly attributable to Wellbutrin, although it is somewhat difficult to say.  Given the timing, it is unlikely Lexapro.  Discontinue Wellbutrin and start BuSpar and monitor weight.  Patient will also keep a food diary.  Other causes should be considered as well.   \"I've lost some weight, been on a diet.\"  I've been doing pretty good.  Mood/Depression: under control  Anxiety: under control  Panic attacks: n  Energy: still tired, daytime sleepiness  Concentration: good  Sleeping: well  Eatin lb wl   Cut down on carbs  Refills: y  Substances: n  Therapy: Daniella, that's helping  Medication compliant: y  No SI HI AVH.    ...    21: In person.  Interview:  Chart review:   His/Her Story: \"I'm kindof a hypochondriac.\"  P10, G14  Describes obsessions that occur re: medication SE. Compulsions of reading voraciously on them over the internet.  He is always worried that he is sick.  Usually he does not have symptoms.  Sometimes he does though (somatic symptom disorder).  The anxiety gets so bad that he sometimes, rarely, has panic attacks.  He was seen in the ER last year in January for a panic attack.  Possible OCD.  Only stopped lexapro before due to sexual side effects in the past. Has never been on bupropion.  It is working quite well for him now.  \"It has calmed me down and only a " "week.\"  Some residual depressed mood, insomnia, feelings of guilt, poor energy and concentration related to the illness anxiety disorder.  Duration is years.  Some anxiety obviously related to the above: Uncontrolled worrying, restlessness, irritability, feeling on edge, insomnia, poor energy and concentration.  \"All that anxiety leaves me exhausted.\"  Substances: CBD Gummies he bought over-the-counter.  Has been using them for about a week.  Denies all else except social alcohol.  Therapy: Has never tried, interested  Medication compliant: Yes  Psych ROS: Depression, anxiety, illness anxiety disorder.  Negative for psychosis and jay.  No SI HI AVH.    Access to Firearms: Denies    PHQ-9 Depression Screening  PHQ-9 Total Score:      Little interest or pleasure in doing things?     Feeling down, depressed, or hopeless?     Trouble falling or staying asleep, or sleeping too much?     Feeling tired or having little energy?     Poor appetite or overeating?     Feeling bad about yourself - or that you are a failure or have let yourself or your family down?     Trouble concentrating on things, such as reading the newspaper or watching television?     Moving or speaking so slowly that other people could have noticed? Or the opposite - being so fidgety or restless that you have been moving around a lot more than usual?     Thoughts that you would be better off dead, or of hurting yourself in some way?     PHQ-9 Total Score       CÉSAR-7       Past Surgical History:  Past Surgical History:   Procedure Laterality Date    CYST REMOVAL  2006    VASECTOMY      06/2020       Problem List:  Patient Active Problem List   Diagnosis    Gynecomastia    Ear pain, bilateral    Anxiety with depression    Class 2 obesity due to excess calories without serious comorbidity with body mass index (BMI) of 37.0 to 37.9 in adult    Fatigue    Snoring    Scrotal pain    Epididymitis       Allergy:   No Known Allergies     Discontinued " Medications:  There are no discontinued medications.      Current Medications:   Current Outpatient Medications   Medication Sig Dispense Refill    Cetirizine HCl (ZyrTEC Allergy) 10 MG capsule As Needed.      escitalopram (LEXAPRO) 20 MG tablet TAKE 1 TABLET BY MOUTH DAILY 90 tablet 1    ketoconazole (NIZORAL) 2 % cream APPLY TOPICALLY TO FEET TWICE DAILY FOR 6 TO 8 WEEKS      ketoconazole (NIZORAL) 2 % shampoo APPLY SHAMPOO AND LEAVE ON FOR 5 TO 10 MINUTES THEN RINSE OFF      Semaglutide-Weight Management (Wegovy) 0.25 MG/0.5ML solution auto-injector Inject 0.25 mg under the skin into the appropriate area as directed 1 (One) Time Per Week. 2.5 mL 1    sulfamethoxazole-trimethoprim (Bactrim DS) 800-160 MG per tablet Take 1 tablet by mouth 2 (Two) Times a Day. 20 tablet 0     No current facility-administered medications for this visit.       Past Medical History:  Past Medical History:   Diagnosis Date    Anxiety     Depression     Panic disorder        Past Psychiatric History:  Began Treatment: From primary care, years  Diagnoses: Depression and anxiety  Psychiatrist:Ramon  Therapist: Couples therapy for marriage counseling years ago  Admission History:Denies  Medication Trials: See HPI  Self Harm: Denies  Suicide Attempts:Denies   Psychosis, Anxiety, Depression: Denies    Substance Abuse History:   Types: Social alcohol  Withdrawal Symptoms:Denies  Longest Period Sober:Not Applicable   AA: Not applicable     Social History:  Martial Status:  Employed:Yes  Kids:Yes  House:Lives in a house   History: Denies    Social History     Socioeconomic History    Marital status:    Tobacco Use    Smoking status: Never    Smokeless tobacco: Never   Vaping Use    Vaping status: Never Used   Substance and Sexual Activity    Alcohol use: Yes     Alcohol/week: 6.0 standard drinks of alcohol     Types: 6 Cans of beer per week    Drug use: Never    Sexual activity: Yes     Partners: Female     Birth  "control/protection: Vasectomy       Family History:   Suicide Attempts: Denies  Suicide Completions:Denies      Family History   Problem Relation Age of Onset    Anxiety disorder Mother     Diabetes Father     Anxiety disorder Maternal Grandfather     Stroke Other        Developmental History:       Childhood: Deferred  High School:Completed  College: Some, he is a .  Applied sciences.    Mental Status Exam  Appearance  : groomed, good eye contact, normal street clothes  Behavior  : pleasant and cooperative  Motor  : No abnormal  Speech  :normal rhythm, rate, volume, tone, not hyperverbal, not pressured, normal prosidy  Mood  : \"I'm good\"  Affect  : euthymic, mood congruent, good variability  Thought Content  : negative suicidal ideations, negative homicidal ideations, negative obsessions  Perceptions  : negative auditory hallucinations, negative visual hallucinations  Thought Process  : linear  Insight/Judgement  : Fair/fair  Cognition  : grossly intact  Attention   : intact      Review of Systems:  Review of Systems   Constitutional:  Negative for diaphoresis, fatigue and fever.   HENT:  Negative for drooling.    Eyes:  Negative for visual disturbance.   Respiratory:  Negative for cough and shortness of breath.    Cardiovascular:  Negative for chest pain, palpitations and leg swelling.   Gastrointestinal:  Negative for abdominal pain, nausea and vomiting.   Endocrine: Negative for cold intolerance and heat intolerance.   Genitourinary:  Negative for difficulty urinating.   Musculoskeletal:  Negative for back pain, joint swelling and neck pain.   Allergic/Immunologic: Negative for immunocompromised state.   Neurological:  Negative for dizziness, seizures, syncope, speech difficulty, weakness, light-headedness, numbness and headaches.   Psychiatric/Behavioral:  Negative for confusion.        Physical Exam:  Physical Exam    Vital Signs:   There were no vitals taken for this visit.     Lab Results:   No visits " with results within 6 Month(s) from this visit.   Latest known visit with results is:   Lab on 11/02/2023   Component Date Value Ref Range Status    Testosterone, Total 11/02/2023 275  264 - 916 ng/dL Final    Adult male reference interval is based on a population of  healthy nonobese males (BMI <30) between 19 and 39 years old.  eugenia Bal.al. JCEM 2017,102;4604-7373. PMID: 11699201.    Testosterone, Free 11/02/2023 7.3 (L)  8.7 - 25.1 pg/mL Final       EKG Results:  No orders to display       Imaging Results:  No Images in the past 120 days found..      Assessment & Plan   Diagnoses and all orders for this visit:    1. Generalized anxiety disorder (Primary)    2. Illness anxiety disorder    3. Panic attacks    4. Insomnia due to mental condition    5. Mixed obsessional thoughts and acts    6. Recurrent major depressive disorder in remission        Visit Diagnoses:    ICD-10-CM ICD-9-CM   1. Generalized anxiety disorder  F41.1 300.02   2. Illness anxiety disorder  F45.21 300.7   3. Panic attacks  F41.0 300.01   4. Insomnia due to mental condition  F51.05 300.9     327.02   5. Mixed obsessional thoughts and acts  F42.2 300.3   6. Recurrent major depressive disorder in remission  F33.40 296.35     09/11/2024: stable, well, no changes, 1 yr.    11/3: Stable, well, no changes. Follow up on testing.    8/29: Dep and anx under control, son with ADHD, and ADHD s/sx. Referral to [facility].    5/15: Doing quite well. No changes.     2/13: Doing well, no changes.     12/13: Weight gain, possibly attributable to Wellbutrin, although it is somewhat difficult to say.  Given the timing, it is unlikely Lexapro.  Discontinue Wellbutrin and start BuSpar and monitor weight.  Patient will also keep a food diary.  Other causes should be considered as well.     8/25: Start bupropion to target anx and sexual dysfxn. Also concentration issues.     7/8: Increase lexapro.     5/26: Illness anxiety disorder, possible somatic  symptom disorder.  Rule out OCD.  Residual depression and anxiety as well.  Continue Lexapro and start Ativan as needed.  Referral to therapy.  Consider Luvox or clomipramine for OCD.  Also high dose fluoxetine.     PLAN:  Safety: No acute safety concerns  Therapy: Referral Made  Risk Assessment: Risk of self-harm acutely is moderate.  Risk factors include anxiety disorder, mood disorder, and recent psychosocial stressors (pandemic). Protective factors include no family history, no access to weapons, no present SI, no history of suicide attempts or self-harm in the past, minimal AODA, healthcare seeking, future orientation, willingness to engage in care.  Risk of self-harm chronically is also moderate, but could be further elevated in the event of treatment noncompliance and/or AODA.  Meds:  CONTINUE Lexapro 10 (not 20) mg daily.Risks, benefits, alternatives discussed with patient including GI upset, nausea vomiting diarrhea, theoretical decrease of seizure threshold predisposing the patient to a slightly higher seizure risk, headaches, sexual dysfunction, serotonin syndrome, bleeding risk, increased suicidality in patients 24 years and younger.  After discussion of these risks and benefits, the patient voiced understanding and agreed to proceed.  S/P:  bupropion  mg daily. WG? 2/23  buspar 10 mg po bid. Noncompliant 8/23  Ativan 0.5 mg p.o. daily as needed anxiety. Not needed. 10/23  Labs: UDS never got    Patient screened positive for depression based on a PHQ-9 score of  on . Follow-up recommendations include: Prescribed antidepressant medication treatment.           TREATMENT PLAN/GOALS: Continue supportive psychotherapy efforts and medications as indicated. Treatment and medication options discussed during today's visit. Patient acknowledged and verbally consented to continue with current treatment plan and was educated on the importance of compliance with treatment and follow-up  appointments.    MEDICATION ISSUES:  CATHY reviewed as expected.  Discussed medication options and treatment plan of prescribed medication as well as the risks, benefits, and side effects including potential falls, possible impaired driving and metabolic adversities among others. Patient is agreeable to call the office with any worsening of symptoms or onset of side effects. Patient is agreeable to call 911 or go to the nearest ER should he/she begin having SI/HI. No medication side effects or related complaints today.     MEDS ORDERED DURING VISIT:  No orders of the defined types were placed in this encounter.      Return in about 1 year (around 9/11/2025) for Video visit.         This document has been electronically signed by José Amaya MD  September 16, 2024 07:09 EDT        Part of this note may be an electronic transcription/translation of spoken language to printed text using the Dragon Dictation System.

## 2024-09-17 ENCOUNTER — PATIENT MESSAGE (OUTPATIENT)
Dept: INTERNAL MEDICINE | Facility: CLINIC | Age: 34
End: 2024-09-17
Payer: COMMERCIAL

## 2024-09-18 DIAGNOSIS — R53.83 LOW ENERGY: Primary | ICD-10-CM

## 2024-09-19 ENCOUNTER — LAB (OUTPATIENT)
Dept: LAB | Facility: HOSPITAL | Age: 34
End: 2024-09-19
Payer: COMMERCIAL

## 2024-09-19 LAB — TESTOST SERPL-MCNC: 367 NG/DL (ref 249–836)

## 2024-09-19 PROCEDURE — 36415 COLL VENOUS BLD VENIPUNCTURE: CPT | Performed by: NURSE PRACTITIONER

## 2024-09-19 PROCEDURE — 84403 ASSAY OF TOTAL TESTOSTERONE: CPT | Performed by: NURSE PRACTITIONER

## 2025-01-28 ENCOUNTER — TELEPHONE (OUTPATIENT)
Dept: PSYCHIATRY | Facility: CLINIC | Age: 35
End: 2025-01-28
Payer: COMMERCIAL

## 2025-01-28 NOTE — TELEPHONE ENCOUNTER
PT PHONED IN AND LVM.    PT REQUESTING A RETURN CALL BACK FROM PROVIDER IN REGARDS TO QUESTIONS; HOWEVER DID NOT STATE ANYTHING FURTHER.    I CALLED PT BACK AND SPOKE TO PT.    PT IS INQUIRING ABOUT IF HE SHOULD GO ON A LOWER DOSE OF LEXAPRO.     PT IS LOOKING TO TRY AND COME OFF OF THE LEXAPRO.    PT GOT SCHEDULED ALSO FOR A F/UP ON 02/21/2025.

## 2025-03-03 NOTE — PROGRESS NOTES
[facility] - Progress Note    Date: 03/04/2025  Time In: 0800  Time Out: 0855    Patient Legal Name: Hamlet Kong  Patient Age: 34 y.o.    CHIEF COMPLAINT: Depression, Anxiety, OCD, Irritability/Anger    Subjective   History of Present Illness     From previous progress note on 8/31/23:  Therapist suggested that patient utilize tools to his benefit, including making lists on his phone, writing down his thoughts rather than trying to remember all of them, and using a visual calendar to remember commitments.  We will continue to address adaptive coping mechanisms further next session, as patient's neuropsych evaluation is scheduled in a few weeks.    Assessment    Mental Status Exam     Appearance: good hygiene and dressed appropriately for the weather  Behavior: calm  Cooperation:  engaged, cooperative, attentive, and friendly  Eye Contact:  good  Affect:  congruent  Mood: expressive  Speech: responsive  Thought Process:  linear and organized  Thought Content: appropriate and abstract  Suicidal: denies  Homicidal:  denies  Hallucinations:  denies  Memory:  intact  Orientation:  person, place, time, and situation  Reliability:  reliable  Insight:  good  Judgment:  good     Clinical Intervention       ICD-10-CM ICD-9-CM   1. Mild episode of recurrent major depressive disorder  F33.0 296.31   2. Generalized anxiety disorder with panic attacks  F41.1 300.02    F41.0 300.01   3. Mixed obsessional thoughts and acts  F42.2 300.3   4. Irritability and anger  R45.4 799.22        Hamlet (34 y.o.) presents today as a follow-up for continued psychotherapy. Individual psychotherapy was provided utilizing CBT techniques to encourage expression of thoughts and feelings, establish new coping skills, manage stress, identify triggers, identify strengths, build confidence, challenge negative thinking patterns, and provide support.  Therapist addressed gap in service (last seen in August 2023), and patient states  "that he had been \"doing pretty well\" until recently when he noticed his irritability, anger and frequency of \"outbursts\" have been increasing.  Patient states that he has a competitive nature which makes it difficult to be an , as he often wants to be the \"one in charge.\"  If his daughter's (age 9) team loses, he states that he will lose sleep, and ruminate over what went wrong for longer than needed.  He states that his wife has noticed more frustration from him as well.  Patient states that he was tested for ADHD, but did not feel that the testing was very thorough, and states that it was determined that he does not have it.  However, he continues to struggle with \"overcompensation\" as his fear of failure is so strong that it causes him significant and unnecessary stress.      Plan   Plan & Goals     Therapist provided some strategies in attempts to reduce feelings of rage when they happen (i.e. deep breathing, physical exercise, getting space, etc.), and also encouraged patient to practice recognizing triggers and accommodating when needed in order to prevent anger when possible.  We will continue to explore other underlying emotions which may be causing patient distress as well (i.e. sadness, fear, etc.), and patient was encouraged to utilize the feelings wheel as we had addressed in previous sessions in order to recognize emotions and practice vulnerability.  We will discuss further next session.    Patient acknowledged and verbally consented to continue working toward resolving current treatment plan goals and was educated on the importance of participation in the therapeutic process.  Patient will remain compliant with medication regimen as prescribed. Discuss any medication side effects, questions or concerns with prescribing provider.  Call 911 or present to the nearest emergency room in an emergency situation.  National Suicide Prevention Lifeline: Call 988. The Lifeline provides 24/7, free " and confidential support for people in distress, prevention and crisis resources.  Crisis Text Line  Text HOME To 397679    Return in about 2 weeks (around 3/18/2025) for Next scheduled follow up.    ____________________  This document has been electronically signed by Daniella Evans LCSW  March 4, 2025 09:29 EST    Part of this note may be an electronic transcription/translation of spoken language to printed text using the Dragon Dictation System.

## 2025-03-04 ENCOUNTER — OFFICE VISIT (OUTPATIENT)
Dept: PSYCHIATRY | Facility: CLINIC | Age: 35
End: 2025-03-04
Payer: COMMERCIAL

## 2025-03-04 DIAGNOSIS — F33.0 MILD EPISODE OF RECURRENT MAJOR DEPRESSIVE DISORDER: Primary | ICD-10-CM

## 2025-03-04 DIAGNOSIS — F41.0 GENERALIZED ANXIETY DISORDER WITH PANIC ATTACKS: ICD-10-CM

## 2025-03-04 DIAGNOSIS — R45.4 IRRITABILITY AND ANGER: ICD-10-CM

## 2025-03-04 DIAGNOSIS — F41.1 GENERALIZED ANXIETY DISORDER WITH PANIC ATTACKS: ICD-10-CM

## 2025-03-04 DIAGNOSIS — F42.2 MIXED OBSESSIONAL THOUGHTS AND ACTS: ICD-10-CM

## 2025-03-04 NOTE — TREATMENT PLAN
Multi-Disciplinary Problems (from Behavioral Health Treatment Plan)      Active Problems       Problem: Anxiety  Start Date: 03/04/25      Problem Details: Hamlet has a history of rumination leading to panic attacks, as he worries about various things and will be building coping strategies to manage his anxiety.          Goal Priority Start Date Expected End Date End Date    Patient will develop and implement behavioral and cognitive strategies to reduce anxiety and irrational fears. -- 03/04/25 09/02/25 --    Goal Details: Progress toward goal:  Not appropriate to rate progress toward goal since this is the initial treatment plan.          Goal Intervention Frequency Start Date End Date    Help patient explore past emotional issues in relation to present anxiety. Q2 Weeks 03/04/25 --    Intervention Details: Duration of treatment until remission of symptoms.          Goal Intervention Frequency Start Date End Date    Help patient develop an awareness of their cognitive and physical responses to anxiety. Q2 Weeks 03/04/25 --    Intervention Details: Duration of treatment until remission of symptoms.                  Problem: Depression  Start Date: 03/04/25      Problem Details: Hamlet reports feeling disappointment, resentment and worthlessness in response to sadness, and this often is shown outwardly through irritability and anger. He could benefit from practicing vulnerability and setting reasonable expectations for himself while finding kelvin in meaningful activities and relationships.          Goal Priority Start Date Expected End Date End Date    Patient will demonstrate the ability to initiate new constructive life skills outside of sessions on a consistent basis. -- 03/04/25 09/02/25 --    Goal Details: Progress toward goal:  Not appropriate to rate progress toward goal since this is the initial treatment plan.          Goal Intervention Frequency Start Date End Date    Assist patient in setting attainable  activities of daily living goals. Q2 Weeks 03/04/25 --      Goal Intervention Frequency Start Date End Date    Provide education about depression Q2 Weeks 03/04/25 --    Intervention Details: Duration of treatment until remission of symptoms.          Goal Intervention Frequency Start Date End Date    Assist patient in developing healthy coping strategies. Q2 Weeks 03/04/25 --    Intervention Details: Duration of treatment until remission of symptoms.                  Problem: Obsessive/Compulsive  Start Date: 03/04/25      Problem Details: Hamlet has an understanding of the OCD cycle and is able to recognizing intrusive and disturbing obsession which causes him to have the tendency to engage in compulsive behaviors. He could benefit from continuing to recognize this pattern and learning ways to cope with related symptoms.        Goal Priority Start Date Expected End Date End Date    Patient will decrease frequency of obsessive/compulsive behaviors that interfere with daily functioning. -- 03/04/25 09/02/25 --    Goal Details: Progress toward goal:  Not appropriate to rate progress toward goal since this is the initial treatment plan.          Goal Intervention Frequency Start Date End Date    Assist patient in learning thought stopping and self talk techniques that cognitively and behaviorally reduce obsessions and compulsions. Q2 Weeks 03/04/25 --    Intervention Details: Duration of treatment until remission of symptoms.                          Reviewed By       Daniella Evans LCSW 03/04/25 0922                     Initiated Treatment Plan for Hamlet.    ____________________  This document has been electronically signed by Daniella Evans LCSW  March 4, 2025 09:22 EST

## 2025-03-04 NOTE — PSYCHOTHERAPY NOTE
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 Am I going to hurt my kids? Am I going to hurt myself?  COMPULSION - mental ritual, reassurance \"I would never do that\"     DISEASE PARK  TRIGGER - movies about any disease (or depression) - I lock on to the symptoms of the characters - if they have a symptom, I have to make sure I don't have that symptom  OBSESSION - Do I have depression?  COMPULSION - mental ritual, reassurance     MORALITY PARK  TRIGGER - argument or disagreement with wife (turns into anxiety), I don't argue - I become very passive, I won't talk a lot  OBSESSION - Am I doing the right thing? I'll never do anything right  COMPULSION - sit by myself and be quiet, relax, calm everything, recap the conversation with her  FEARED CONSEQUENCE - wife leaving, taking kids, splitting custody - alone (more distress, trying to avoid it)     I don't like confrontation at all - I don't like to bring up anything confrontational  I don't like making people - fear abandonment (worse after wife cheated in 2020)  ________________     12 - son - Jamel - (jealousy of 7 year old)   9 - daughter (jealousy of 2 year old)  4 - daughter (can play well " with 10 year old)  _________________    I'm more irritable lately  I feel like I need to be in control of things    I'm getting really angry over the littlest things  Lexapro    I  soccer - I'm  (any decision the  makes that I can't control, my mind is racing and I can't sleep)  My 9 year old daughter plays soccer    I'm super competitive - losing matters a lot to me    I've always been like that, even as a kid  They put pressure on me to win (growing up)  I will ruminate over what happened    I took a screenshot of something online  Drawbacks of being competitive  Obsessive, jealous and resentful  I don't like to see other people happy   I have anxiety about seeing other people happy (it makes me feel less than because of it)    Using kindness & compassion    I'm mind-reading what the other team or other people think of me  Image is a lot of the problem  Our girls showed up but we showed up later - I don't     I'm in control of my job now - when I'm in charge  I get fulfillment from it    We moved my son's school - he had a bully at the other one  I'm scared if I let my anger out, I don't know what it will do    NEXT TIME  rejection sensitive dysphoria?  Fear of failure & fear of criticism from others

## 2025-03-04 NOTE — PLAN OF CARE
Initiated Treatment Plan for Hamlet.    ____________________  This document has been electronically signed by Daniella Evans LCSW  March 4, 2025 09:22 EST

## 2025-03-12 NOTE — PROGRESS NOTES
[facility] - Progress Note    Date: 03/17/2025  Time In: 0802  Time Out: 0855    Patient Legal Name: Hamlet Kong  Patient Age: 34 y.o.    CHIEF COMPLAINT: Depression, Anxiety, OCD, Irritability/Anger     Subjective   History of Present Illness     From previous progress note on 3/4/25:  Therapist provided some strategies in attempts to reduce feelings of rage when they happen (i.e. deep breathing, physical exercise, getting space, etc.), and also encouraged patient to practice recognizing triggers and accommodating when needed in order to prevent anger when possible.  We will continue to explore other underlying emotions which may be causing patient distress as well (i.e. sadness, fear, etc.), and patient was encouraged to utilize the feelings wheel as we had addressed in previous sessions in order to recognize emotions and practice vulnerability.  We will discuss further next session.    Assessment    Mental Status Exam     Appearance: good hygiene and dressed appropriately for the weather  Behavior: calm  Cooperation:  engaged, cooperative, attentive, and friendly  Eye Contact:  good  Affect:  congruent  Mood: expressive; somewhat anxious  Speech: responsive  Thought Process:  linear and organized  Thought Content: appropriate and abstract  Suicidal: denies  Homicidal:  denies  Hallucinations:  denies  Memory:  intact  Orientation:  person, place, time, and situation  Reliability:  reliable  Insight:  good  Judgment:  good     Clinical Intervention       ICD-10-CM ICD-9-CM   1. Mixed obsessional thoughts and acts  F42.2 300.3   2. Generalized anxiety disorder with panic attacks  F41.1 300.02    F41.0 300.01   3. Recurrent major depressive disorder in remission  F33.40 296.35        Hamlet (34 y.o.) presents today as a follow-up for continued psychotherapy. Individual psychotherapy was provided utilizing CBT techniques to encourage expression of thoughts and feelings, establish new coping  "skills, manage stress, recognize patterns of behavior, build confidence, challenge negative thinking patterns, and provide support.  Patient reports that he has been \"feeling better\" since our last session, stating that he is trying to adapt easier to change, is motivated to finish tasks that he has started, and is continuing to find ways to challenge cognitive distortions when he feels anxious.  He states that intrusive thoughts related to illness and anxiety have decreased, although he still feels irritable when he is having to be hypervigilant and has \"guilt\" for it at times.    Plan   Plan & Goals     Patient appears to be expressing emotions more freely, had a congruent affect throughout session today, and is being mindful of how his mood and behavior may affect those around him.  Therapist praised him for these efforts, and encouraged him to talk with prescribing provider as appointment later this week regarding his neuropsychological evaluation.  Patient was encouraged to bring results to his session with psychiatrist.  We will discuss further next session.    Patient acknowledged and verbally consented to continue working toward resolving current treatment plan goals and was educated on the importance of participation in the therapeutic process.  Patient will remain compliant with medication regimen as prescribed. Discuss any medication side effects, questions or concerns with prescribing provider.  Call 911 or present to the nearest emergency room in an emergency situation.  National Suicide Prevention Lifeline: Call 988. The Lifeline provides 24/7, free and confidential support for people in distress, prevention and crisis resources.  Crisis Text Line  Text HOME To 058222    Return in about 2 weeks (around 3/31/2025) for Next scheduled follow up.    ____________________  This document has been electronically signed by Daniella Evans LCSW  March 17, 2025 09:04 EDT    Part of this note may be an " electronic transcription/translation of spoken language to printed text using the Dragon Dictation System.

## 2025-03-17 ENCOUNTER — OFFICE VISIT (OUTPATIENT)
Dept: PSYCHIATRY | Facility: CLINIC | Age: 35
End: 2025-03-17
Payer: COMMERCIAL

## 2025-03-17 DIAGNOSIS — F41.0 GENERALIZED ANXIETY DISORDER WITH PANIC ATTACKS: ICD-10-CM

## 2025-03-17 DIAGNOSIS — F33.40 RECURRENT MAJOR DEPRESSIVE DISORDER IN REMISSION: ICD-10-CM

## 2025-03-17 DIAGNOSIS — F42.2 MIXED OBSESSIONAL THOUGHTS AND ACTS: Primary | ICD-10-CM

## 2025-03-17 DIAGNOSIS — F41.1 GENERALIZED ANXIETY DISORDER WITH PANIC ATTACKS: ICD-10-CM

## 2025-03-17 NOTE — PSYCHOTHERAPY NOTE
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 Am I going to hurt my kids? Am I going to hurt myself?  COMPULSION - mental ritual, reassurance \"I would never do that\"     DISEASE PARK  TRIGGER - movies about any disease (or depression) - I lock on to the symptoms of the characters - if they have a symptom, I have to make sure I don't have that symptom  OBSESSION - Do I have depression?  COMPULSION - mental ritual, reassurance     MORALITY PARK  TRIGGER - argument or disagreement with wife (turns into anxiety), I don't argue - I become very passive, I won't talk a lot  OBSESSION - Am I doing the right thing? I'll never do anything right  COMPULSION - sit by myself and be quiet, relax, calm everything, recap the conversation with her  FEARED CONSEQUENCE - wife leaving, taking kids, splitting custody - alone (more distress, trying to avoid it)     I don't like confrontation at all - I don't like to bring up anything confrontational  I don't like making people - fear abandonment (worse after wife cheated in 2020)  ________________     12 - son - Jamel - (jealousy of 7 year old)   9 - daughter (jealousy of 2 year old)  4 - daughter (can play well " with 10 year old)  _________________    NEXT TIME  rejection sensitive dysphoria?  Fear of failure & fear of criticism from others    I'm an introvert and I'll talk to you if I'm interested in it    I don't like to be in a crowd  I don't like to take my kids to a water park  I'm hypervigilant about someone taking my kids (especially when there is a crowd)    Little stuff can get me worked up  When it's a big deal, I can do okay    When I have a plan, I don't like it to change    Now that I'm older, I see tendencies of my mom shutting down when she's upset    I texted my emotions with my wife and that went well  I told her that I was feeling mad about something    Now my worry is if I'm doing the right thing, a good job    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 Amaya - not dx with ADHD per patient

## 2025-04-15 NOTE — PROGRESS NOTES
[facility] - Progress Note    Date: 04/22/2025  Time In: 0801  Time Out: 0854    Patient Legal Name: Hamlet Kong  Patient Age: 34 y.o.    CHIEF COMPLAINT: Depression, Anxiety, OCD, Irritability/Anger     Subjective   History of Present Illness     From previous progress note on 3/17/25:  Patient appears to be expressing emotions more freely, had a congruent affect throughout session today, and is being mindful of how his mood and behavior may affect those around him.  Therapist praised him for these efforts, and encouraged him to talk with prescribing provider as appointment later this week regarding his neuropsychological evaluation.  Patient was encouraged to bring results to his session with psychiatrist.  We will discuss further next session.    Assessment    Mental Status Exam     Appearance: good hygiene and dressed appropriately for the weather  Behavior: restless  Cooperation:  engaged, cooperative, attentive, and friendly  Eye Contact:  good  Affect:  congruent  Mood: expressive  Speech: responsive  Thought Process:  linear and organized  Thought Content: appropriate and abstract; some intrusive thoughts  Suicidal: denies  Homicidal:  denies  Hallucinations:  denies  Memory:  intact  Orientation:  person, place, time, and situation  Reliability:  reliable  Insight:  good  Judgment:  good     Clinical Intervention       ICD-10-CM ICD-9-CM   1. Generalized anxiety disorder with panic attacks  F41.1 300.02    F41.0 300.01   2. Irritability and anger  R45.4 799.22   3. Mixed obsessional thoughts and acts  F42.2 300.3   4. Recurrent major depressive disorder in remission  F33.40 296.35        Hamlet (34 y.o.) presents today as a follow-up for continued psychotherapy. Individual psychotherapy was provided utilizing ACT techniques to encourage expression of thoughts and feelings, establish new coping skills, increase acceptance, manage stress, acknowledge sources of feelings and behaviors,  "challenge negative thinking patterns, provide support, and practice cognitive defusion.  Patient reports that he is still feeling \"irritable\" at times, especially when his daughters soccer team loses a game.  He states that anything about [place] will cause him to \"ruminate for days.\"  Therapist offered psychoeducation on physiological changes in response to anxiety and irritability.      Plan   Plan & Goals     Patient demonstrated understanding of deep breathing techniques during session today as well as body scan mindfulness, and therapist encouraged him to practice these techniques daily to help reduce tension.  He also was given strategies to help him be more flexible with his schedule and in his communication with his wife, and we will discuss this further next session.    Patient acknowledged and verbally consented to continue working toward resolving current treatment plan goals and was educated on the importance of participation in the therapeutic process.  Patient will remain compliant with medication regimen as prescribed. Discuss any medication side effects, questions or concerns with prescribing provider.  Call 911 or present to the nearest emergency room in an emergency situation.  National Suicide Prevention Lifeline: Call 988. The Lifeline provides 24/7, free and confidential support for people in distress, prevention and crisis resources.  Crisis Text Line  Text HOME To 433039    Return in about 2 weeks (around 5/6/2025) for Next scheduled follow up.    ____________________  This document has been electronically signed by Daniella Evans LCSW  April 22, 2025 08:59 EDT    Part of this note may be an electronic transcription/translation of spoken language to printed text using the Dragon Dictation System.  "

## 2025-04-22 ENCOUNTER — OFFICE VISIT (OUTPATIENT)
Dept: PSYCHIATRY | Facility: CLINIC | Age: 35
End: 2025-04-22
Payer: COMMERCIAL

## 2025-04-22 DIAGNOSIS — F41.1 GENERALIZED ANXIETY DISORDER WITH PANIC ATTACKS: Primary | ICD-10-CM

## 2025-04-22 DIAGNOSIS — F41.0 GENERALIZED ANXIETY DISORDER WITH PANIC ATTACKS: Primary | ICD-10-CM

## 2025-04-22 DIAGNOSIS — F33.40 RECURRENT MAJOR DEPRESSIVE DISORDER IN REMISSION: ICD-10-CM

## 2025-04-22 DIAGNOSIS — R45.4 IRRITABILITY AND ANGER: ICD-10-CM

## 2025-04-22 DIAGNOSIS — F42.2 MIXED OBSESSIONAL THOUGHTS AND ACTS: ICD-10-CM

## 2025-04-22 NOTE — PSYCHOTHERAPY NOTE
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 Am I going to hurt my kids? Am I going to hurt myself?  COMPULSION - mental ritual, reassurance \"I would never do that\"     DISEASE PARK  TRIGGER - movies about any disease (or depression) - I lock on to the symptoms of the characters - if they have a symptom, I have to make sure I don't have that symptom  OBSESSION - Do I have depression?  COMPULSION - mental ritual, reassurance     MORALITY PARK  TRIGGER - argument or disagreement with wife (turns into anxiety), I don't argue - I become very passive, I won't talk a lot  OBSESSION - Am I doing the right thing? I'll never do anything right  COMPULSION - sit by myself and be quiet, relax, calm everything, recap the conversation with her  FEARED CONSEQUENCE - wife leaving, taking kids, splitting custody - alone (more distress, trying to avoid it)     I don't like confrontation at all - I don't like to bring up anything confrontational  I don't like making people - fear abandonment (worse after wife cheated in 2020)  ________________     12 - son - Jamel - (jealousy of 7 year old)   9 - daughter (jealousy of 2 year old)  4 - daughter (can play well " with 10 year old)  _________________    A couple more weekends of soccer  I ruminate over things for days and days  I'm still getting irritable easily and I feel really competitive    Mainly because I'm not in control  I like to be in control    When I win and when I'm in control,     If I do a job and it goes well, I feel good  When it doesn't go well, I get mad because I want it to be right  Usually when things don't go well, I take it personally    I attach myself personally to these games  Self-worth, self-acceptance, self-esteem    I get anxiety if I don't get to work out  Another thing that drives my wife crazy  If something happens where I don't get to work out    I get anxiety if my schedule changes  Needing to be more flexible  It's hard for me to enjoy vacations because I'm out of a routine    Motivated to make some changes    Deep breathing  Body scan mindfulness meditation

## 2025-04-28 ENCOUNTER — OFFICE VISIT (OUTPATIENT)
Dept: PSYCHIATRY | Facility: CLINIC | Age: 35
End: 2025-04-28
Payer: COMMERCIAL

## 2025-04-28 VITALS
BODY MASS INDEX: 40.44 KG/M2 | HEIGHT: 68 IN | DIASTOLIC BLOOD PRESSURE: 78 MMHG | HEART RATE: 64 BPM | SYSTOLIC BLOOD PRESSURE: 128 MMHG | WEIGHT: 266.8 LBS

## 2025-04-28 DIAGNOSIS — F41.1 GENERALIZED ANXIETY DISORDER WITH PANIC ATTACKS: Primary | ICD-10-CM

## 2025-04-28 DIAGNOSIS — F33.40 RECURRENT MAJOR DEPRESSIVE DISORDER IN REMISSION: ICD-10-CM

## 2025-04-28 DIAGNOSIS — F41.0 PANIC ATTACKS: ICD-10-CM

## 2025-04-28 DIAGNOSIS — F41.0 GENERALIZED ANXIETY DISORDER WITH PANIC ATTACKS: Primary | ICD-10-CM

## 2025-04-28 DIAGNOSIS — F51.05 INSOMNIA DUE TO MENTAL CONDITION: ICD-10-CM

## 2025-04-28 DIAGNOSIS — F42.2 MIXED OBSESSIONAL THOUGHTS AND ACTS: ICD-10-CM

## 2025-04-28 DIAGNOSIS — R45.4 IRRITABILITY AND ANGER: ICD-10-CM

## 2025-04-28 DIAGNOSIS — F45.21 ILLNESS ANXIETY DISORDER: ICD-10-CM

## 2025-04-28 PROCEDURE — 99214 OFFICE O/P EST MOD 30 MIN: CPT | Performed by: STUDENT IN AN ORGANIZED HEALTH CARE EDUCATION/TRAINING PROGRAM

## 2025-04-28 PROCEDURE — 90833 PSYTX W PT W E/M 30 MIN: CPT | Performed by: STUDENT IN AN ORGANIZED HEALTH CARE EDUCATION/TRAINING PROGRAM

## 2025-04-28 RX ORDER — VENLAFAXINE HYDROCHLORIDE 37.5 MG/1
75 CAPSULE, EXTENDED RELEASE ORAL DAILY
Qty: 60 CAPSULE | Refills: 2 | Status: SHIPPED | OUTPATIENT
Start: 2025-04-28 | End: 2025-05-02 | Stop reason: SDUPTHER

## 2025-04-28 RX ORDER — ESCITALOPRAM OXALATE 10 MG/1
5 TABLET ORAL DAILY
Qty: 7 TABLET | Refills: 0 | Status: SHIPPED | OUTPATIENT
Start: 2025-04-28 | End: 2025-05-02

## 2025-04-28 NOTE — TREATMENT PLAN
Anxiety:  4/10 progressing    Goals:  Patient will develop and implement behavioral and cognitive strategies to reduce anxiety and irrational fears, monthly, using Rogerian psychotherapy and CBT where appropriate.  Help patient explore past emotional issues in relation to present anxiety, monthly, until remission of symptoms, using Rogerian psychotherapy and CBT where appropriate.  Help patient develop an awareness of their cognitive and physical responses to anxiety, monthly, until remission of symptoms, using Rogerian psychotherapy and CBT where appropriate.          Depression:  2/10 progressing    Goals:  Patient will demonstrate the ability to initiate new constructive life skills outside of sessions on a consistent basis, monthly, using Rogerian psychotherapy and CBT where appropriate.  Assist patient in setting attainable activities of daily living goals, monthly, using Rogerian psychotherapy and CBT where appropriate.  Provide education about depression, monthly, using Rogerian psychotherapy and CBT where appropriate.  Assist patient in developing healthy coping strategies, monthly, using Rogerian psychotherapy and CBT where appropriate.    Rogerian psychotherapy and CBT will be used to help accomplish the above goals and manage depression and anxiety related to family conflict, work stresses, perfectionism, control      I have discussed and reviewed this treatment plan with the patient.      Reviewed by José Amaya MD   04/28/2025

## 2025-04-28 NOTE — PATIENT INSTRUCTIONS
1.  Please return to clinic at your next scheduled visit.  Contact the clinic ([phone]) at least 24 hours prior in the event you need to cancel.  2.  Do no harm to yourself or others.    3.  Avoid alcohol and drugs.    4.  Take all medications as prescribed.  Please contact the clinic with any concerns. If you are in need of medication refills, please call the clinic at [phone].    5. Should you want to get in touch with your provider, Dr. José Amaya, please utilize [employer] or contact the office ([phone]), and staff will be able to page Dr. Amaya directly.  6.  In the event you have personal crisis, contact the following crisis numbers: Suicide Prevention Hotline 1-740.414.3887; MICAH Helpline 4-231-886-MICAH; [facility] [phone]; text HELLO to 097758; or 092.     Take half a tab daily of lexapro for a week then stop.  Take one cap of effexor daily for a week, then increase to 2 caps

## 2025-04-28 NOTE — PROGRESS NOTES
"Subjective   Hamlet Kong is a 34 y.o. male who presents today for initial evaluation     Referring Provider:  No referring provider defined for this encounter.    Chief Complaint: Anxiety and depression    History of Present Illness:     Chart review:   2025: Daniella x3.  2024: int med, unknown, urology, no masses on testicular U/S. Does not have ADHD per neuropsych testing.  2023: Daniella x1  Daniella x2  Seen by sleep medicine.  Home sleep study in February shows mild BRET.  Recommend auto CPAP.    [place]ann[place]2024: stable, well, no changes, 1 yr.  11/3/23: Stable, well, no changes. Follow up on testing.  : Dep and anx under control, son with ADHD, and ADHD s/sx. Referral to [facility] for testing.      Visits (Below):  \"Hamlet\"    2025:   Interview:  \"I'm more irritable.\"  Also ruminating  I'm also gaining weight  Constantly doing stuff at home  Mood/Depression: MDD: stable  Anxiety: CÉSAR: irritable  Panic attacks: stable  Energy: stable  Concentration: stable  Insomnia: stable  Eatin, 250, 251 lbs  Refills: none  Substances: def  Therapy: Daniella x3  Medication compliant: y  SE: none  No SI HI AVH.      2024:   Virtual visit via Zoom audio and video due to the COVID-19 pandemic.  Patient is accepting of and agreeable to visit.  The visit consisted of the patient and I. Patient is at home, and I am at the office.  Interview:  \"Doing good.\"  No complaints.  Mood/Depression: MDD: stable  Anxiety: CÉSAR: stable  Panic attacks: stable  Energy: stable  Concentration: stable  Insomnia: stable  Eatin, 251 lbs  Refills: none  Substances: def  Therapy: Daniella, looks like may have stopped  Medication compliant: y  SE: none  No SI HI AVH.      ...      : Chart review: Significant anxiety and depression.  Recently seen by primary care .  Dr. Braun started the patient on Pristiq 25 mg a day along with BuSpar 10 twice daily.  Patient tried and failed Trintellix and " "hydroxyzine.  Also interested in psychotherapy.  Anxiety since at least 2018.  Failed Lexapro, Zoloft, vilazodone, pristiq.  PDMP is empty.  January labs show elevated glucose 179, low sodium 31, chloride 96 low, otherwise reassuring CMP, thyroid studies, lipids, iron studies, some abnormalities on the CBC, but white count, hemoglobin and hematocrit and platelets are within normal limits.  Holter monitor in October 2020 showing periods of sinus tachycardia.  January 2021: Sinus 406.  No head imaging.  Not a lot in Care Everywhere.  May have been restarted on Lexapro 10 mg a day a few days back.      5/26/21: In person.  Interview:  Chart review:   His/Her Story: \"I'm kindof a hypochondriac.\"  P10, G14  Describes obsessions that occur re: medication SE. Compulsions of reading voraciously on them over the internet.  He is always worried that he is sick.  Usually he does not have symptoms.  Sometimes he does though (somatic symptom disorder).  The anxiety gets so bad that he sometimes, rarely, has panic attacks.  He was seen in the ER last year in January for a panic attack.  Possible OCD.  Only stopped lexapro before due to sexual side effects in the past. Has never been on bupropion.  It is working quite well for him now.  \"It has calmed me down and only a week.\"  Some residual depressed mood, insomnia, feelings of guilt, poor energy and concentration related to the illness anxiety disorder.  Duration is years.  Some anxiety obviously related to the above: Uncontrolled worrying, restlessness, irritability, feeling on edge, insomnia, poor energy and concentration.  \"All that anxiety leaves me exhausted.\"  Substances: CBD Gummies he bought over-the-counter.  Has been using them for about a week.  Denies all else except social alcohol.  Therapy: Has never tried, interested  Medication compliant: Yes  Psych ROS: Depression, anxiety, illness anxiety disorder.  Negative for psychosis and jay.  No SI HI AVH.    Access to " Firearms: Denies    PHQ-9 Depression Screening  PHQ-9 Total Score:      Little interest or pleasure in doing things?     Feeling down, depressed, or hopeless?     Trouble falling or staying asleep, or sleeping too much?     Feeling tired or having little energy?     Poor appetite or overeating?     Feeling bad about yourself - or that you are a failure or have let yourself or your family down?     Trouble concentrating on things, such as reading the newspaper or watching television?     Moving or speaking so slowly that other people could have noticed? Or the opposite - being so fidgety or restless that you have been moving around a lot more than usual?     Thoughts that you would be better off dead, or of hurting yourself in some way?     PHQ-9 Total Score       CÉSAR-7       Past Surgical History:  Past Surgical History:   Procedure Laterality Date    CYST REMOVAL  2006    VASECTOMY      06/2020       Problem List:  Patient Active Problem List   Diagnosis    Gynecomastia    Ear pain, bilateral    Anxiety with depression    Class 2 obesity due to excess calories without serious comorbidity with body mass index (BMI) of 37.0 to 37.9 in adult    Fatigue    Snoring    Scrotal pain    Epididymitis       Allergy:   No Known Allergies     Discontinued Medications:  Medications Discontinued During This Encounter   Medication Reason    escitalopram (LEXAPRO) 20 MG tablet Reorder         Current Medications:   Current Outpatient Medications   Medication Sig Dispense Refill    Cetirizine HCl (ZyrTEC Allergy) 10 MG capsule As Needed.      escitalopram (LEXAPRO) 10 MG tablet Take 0.5 tablets by mouth Daily. 0.5 tab daily for 7 - 14 days, then stop 7 tablet 0    ketoconazole (NIZORAL) 2 % cream APPLY TOPICALLY TO FEET TWICE DAILY FOR 6 TO 8 WEEKS      ketoconazole (NIZORAL) 2 % shampoo APPLY SHAMPOO AND LEAVE ON FOR 5 TO 10 MINUTES THEN RINSE OFF      Semaglutide-Weight Management (Wegovy) 0.25 MG/0.5ML solution auto-injector  Inject 0.25 mg under the skin into the appropriate area as directed 1 (One) Time Per Week. (Patient not taking: Reported on 2025) 2.5 mL 1    sulfamethoxazole-trimethoprim (Bactrim DS) 800-160 MG per tablet Take 1 tablet by mouth 2 (Two) Times a Day. (Patient not taking: Reported on 2025) 20 tablet 0    venlafaxine XR (Effexor XR) 37.5 MG 24 hr capsule Take 2 capsules by mouth Daily. 1 cap daily x7d, then 2 cap daily 60 capsule 2     No current facility-administered medications for this visit.       Past Medical History:  Past Medical History:   Diagnosis Date    Anxiety     Depression     Panic disorder        Past Psychiatric History:  Began Treatment: From primary care, years  Diagnoses: Depression and anxiety  Psychiatrist:Ramon  Therapist: Couples therapy for marriage counseling years ago  Admission History:Denies  Medication Trials: See HPI  Self Harm: Denies  Suicide Attempts:Denies   Psychosis, Anxiety, Depression: Denies    Substance Abuse History:   Types: Social alcohol  Withdrawal Symptoms:Denies  Longest Period Sober:Not Applicable   AA: Not applicable     Social History:  Martial Status:  Employed:Yes  Kids:Yes  House:Lives in a house   History: Denies    Social History     Socioeconomic History    Marital status:    Tobacco Use    Smoking status: Never    Smokeless tobacco: Never   Vaping Use    Vaping status: Never Used   Substance and Sexual Activity    Alcohol use: Yes     Alcohol/week: 6.0 standard drinks of alcohol     Types: 6 Cans of beer per week    Drug use: Never    Sexual activity: Yes     Partners: Female     Birth control/protection: Vasectomy       Family History:   Suicide Attempts: Denies  Suicide Completions:Denies      Family History   Problem Relation Age of Onset    Anxiety disorder Mother     Diabetes Father     Anxiety disorder Maternal Grandfather     Stroke Other        Developmental History:       Childhood: Deferred  High  "School:Completed  College: Some, he is a .  Applied sciences.    Mental Status Exam  Appearance  : groomed, good eye contact, normal street clothes  Behavior  : pleasant and cooperative  Motor  : No abnormal  Speech  :normal rhythm, rate, volume, tone, not hyperverbal, not pressured, normal prosidy  Mood  : \"More irritable\"  Affect  : euthymic, mood incongruent, good variability  Thought Content  : negative suicidal ideations, negative homicidal ideations, negative obsessions  Perceptions  : negative auditory hallucinations, negative visual hallucinations  Thought Process  : linear  Insight/Judgement  : Fair/fair  Cognition  : grossly intact  Attention   : intact      Review of Systems:  Review of Systems   Constitutional:  Negative for diaphoresis, fatigue and fever.   HENT:  Negative for drooling.    Eyes:  Negative for visual disturbance.   Respiratory:  Negative for cough and shortness of breath.    Cardiovascular:  Negative for chest pain, palpitations and leg swelling.   Gastrointestinal:  Negative for abdominal pain, nausea and vomiting.   Endocrine: Negative for cold intolerance and heat intolerance.   Genitourinary:  Negative for difficulty urinating.   Musculoskeletal:  Negative for back pain, joint swelling and neck pain.   Allergic/Immunologic: Negative for immunocompromised state.   Neurological:  Negative for dizziness, seizures, syncope, speech difficulty, weakness, light-headedness, numbness and headaches.   Psychiatric/Behavioral:  Negative for confusion.        Physical Exam:  Physical Exam    Vital Signs:   /78   Pulse 64   Ht 172.7 cm (68\")   Wt 121 kg (266 lb 12.8 oz)   BMI 40.57 kg/m²      Lab Results:   No visits with results within 6 Month(s) from this visit.   Latest known visit with results is:   Orders Only on 09/18/2024   Component Date Value Ref Range Status    Testosterone, Total 09/19/2024 367.00  249.00 - 836.00 ng/dL Final       EKG Results:  No orders to display "       Imaging Results:  No Images in the past 120 days found..      Assessment & Plan   Diagnoses and all orders for this visit:    1. Generalized anxiety disorder with panic attacks (Primary)  -     escitalopram (LEXAPRO) 10 MG tablet; Take 0.5 tablets by mouth Daily. 0.5 tab daily for 7 - 14 days, then stop  Dispense: 7 tablet; Refill: 0  -     venlafaxine XR (Effexor XR) 37.5 MG 24 hr capsule; Take 2 capsules by mouth Daily. 1 cap daily x7d, then 2 cap daily  Dispense: 60 capsule; Refill: 2    2. Irritability and anger  -     escitalopram (LEXAPRO) 10 MG tablet; Take 0.5 tablets by mouth Daily. 0.5 tab daily for 7 - 14 days, then stop  Dispense: 7 tablet; Refill: 0  -     venlafaxine XR (Effexor XR) 37.5 MG 24 hr capsule; Take 2 capsules by mouth Daily. 1 cap daily x7d, then 2 cap daily  Dispense: 60 capsule; Refill: 2    3. Mixed obsessional thoughts and acts  -     escitalopram (LEXAPRO) 10 MG tablet; Take 0.5 tablets by mouth Daily. 0.5 tab daily for 7 - 14 days, then stop  Dispense: 7 tablet; Refill: 0  -     venlafaxine XR (Effexor XR) 37.5 MG 24 hr capsule; Take 2 capsules by mouth Daily. 1 cap daily x7d, then 2 cap daily  Dispense: 60 capsule; Refill: 2    4. Recurrent major depressive disorder in remission  -     escitalopram (LEXAPRO) 10 MG tablet; Take 0.5 tablets by mouth Daily. 0.5 tab daily for 7 - 14 days, then stop  Dispense: 7 tablet; Refill: 0  -     venlafaxine XR (Effexor XR) 37.5 MG 24 hr capsule; Take 2 capsules by mouth Daily. 1 cap daily x7d, then 2 cap daily  Dispense: 60 capsule; Refill: 2    5. Illness anxiety disorder  -     escitalopram (LEXAPRO) 10 MG tablet; Take 0.5 tablets by mouth Daily. 0.5 tab daily for 7 - 14 days, then stop  Dispense: 7 tablet; Refill: 0  -     venlafaxine XR (Effexor XR) 37.5 MG 24 hr capsule; Take 2 capsules by mouth Daily. 1 cap daily x7d, then 2 cap daily  Dispense: 60 capsule; Refill: 2    6. Panic attacks  -     escitalopram (LEXAPRO) 10 MG tablet; Take  0.5 tablets by mouth Daily. 0.5 tab daily for 7 - 14 days, then stop  Dispense: 7 tablet; Refill: 0  -     venlafaxine XR (Effexor XR) 37.5 MG 24 hr capsule; Take 2 capsules by mouth Daily. 1 cap daily x7d, then 2 cap daily  Dispense: 60 capsule; Refill: 2    7. Insomnia due to mental condition  -     escitalopram (LEXAPRO) 10 MG tablet; Take 0.5 tablets by mouth Daily. 0.5 tab daily for 7 - 14 days, then stop  Dispense: 7 tablet; Refill: 0  -     venlafaxine XR (Effexor XR) 37.5 MG 24 hr capsule; Take 2 capsules by mouth Daily. 1 cap daily x7d, then 2 cap daily  Dispense: 60 capsule; Refill: 2        Visit Diagnoses:    ICD-10-CM ICD-9-CM   1. Generalized anxiety disorder with panic attacks  F41.1 300.02    F41.0 300.01   2. Irritability and anger  R45.4 799.22   3. Mixed obsessional thoughts and acts  F42.2 300.3   4. Recurrent major depressive disorder in remission  F33.40 296.35   5. Illness anxiety disorder  F45.21 300.7   6. Panic attacks  F41.0 300.01   7. Insomnia due to mental condition  F51.05 300.9     327.02     04/28/2025: CÉSAR/irritable. Can't determine any trigger. Change escitalopram to effexor.    Acknowledged and normalized patient's thoughts, feelings, and concerns. Allowed patient to freely discuss and process issues, such as:  Anxiety and depression regarding family conflict/relationships.  ... using Rogerian psychotherapeutic techniques including unconditional positive regard, reflective listening, and demonstrating clear empathy, with the goal of ameliorating symptoms and maintaining, restoring, or improving self-esteem, adaptive skills, and ego or psychological functions (Gladys et al. 1991), the long-term goal of which is to develop a better, healthier perspective and help the patient bear their circumstances more easily.  Time (minutes) spent providing supportive psychotherapy: 16  (This time is exclusive to the therapy session and separate from the time spent on activities used to meet the  criteria for the E/M service (history, exam, medical decision-making).)  Start: 2:50  Stop: 3:06  Functional status: mild impairment  Treatment plan: Medication management and supportive psychotherapy  Prognosis: good  Progress: CÉSAR  4w, virtual    09/11/2024: stable, well, no changes, 1 yr.    11/3: Stable, well, no changes. Follow up on testing.    8/29: Dep and anx under control, son with ADHD, and ADHD s/sx. Referral to [facility] for testing.    5/15: Doing quite well. No changes.     2/13: Doing well, no changes.     12/13: Weight gain, possibly attributable to Wellbutrin, although it is somewhat difficult to say.  Given the timing, it is unlikely Lexapro.  Discontinue Wellbutrin and start BuSpar and monitor weight.  Patient will also keep a food diary.  Other causes should be considered as well.     8/25: Start bupropion to target anx and sexual dysfxn. Also concentration issues.     7/8: Increase lexapro.     5/26: Illness anxiety disorder, possible somatic symptom disorder.  Rule out OCD.  Residual depression and anxiety as well.  Continue Lexapro and start Ativan as needed.  Referral to therapy.  Consider Luvox or clomipramine for OCD.  Also high dose fluoxetine.     PLAN:  Safety: No acute safety concerns  Therapy: Referral Made  Risk Assessment: Risk of self-harm acutely is moderate.  Risk factors include anxiety disorder, mood disorder, and recent psychosocial stressors (pandemic). Protective factors include no family history, no access to weapons, no present SI, no history of suicide attempts or self-harm in the past, minimal AODA, healthcare seeking, future orientation, willingness to engage in care.  Risk of self-harm chronically is also moderate, but could be further elevated in the event of treatment noncompliance and/or AODA.  Meds:  REDUCE Lexapro 10 (not 20, which made him feel numb) to 5 mg x7d, then STOP.Risks, benefits, alternatives discussed with patient including GI upset, nausea vomiting  diarrhea, theoretical decrease of seizure threshold predisposing the patient to a slightly higher seizure risk, headaches, sexual dysfunction, serotonin syndrome, bleeding risk, increased suicidality in patients 24 years and younger.  After discussion of these risks and benefits, the patient voiced understanding and agreed to proceed.  START effexor 37.5 mg qday x7d, then 75 mg qday. Risks, benefits, alternatives discussed with patient including GI upset, nausea vomiting diarrhea, theoretical decrease of seizure threshold predisposing the patient to a slightly higher seizure risk, headaches, sexual dysfunction, serotonin syndrome, bleeding risk, increased suicidality in patients 24 years and younger, switching to jay/hypomania.  Also constipation and urinary retention.  After discussion of these risks and benefits, the patient voiced understanding and agreed to proceed.  S/P:  bupropion  mg daily. WG? 2/23  buspar 10 mg po bid. Noncompliant 8/23  Ativan 0.5 mg p.o. daily as needed anxiety. Not needed. 10/23  Labs: UDS never got    Patient screened positive for depression based on a PHQ-9 score of  on . Follow-up recommendations include: Prescribed antidepressant medication treatment.           TREATMENT PLAN/GOALS: Continue supportive psychotherapy efforts and medications as indicated. Treatment and medication options discussed during today's visit. Patient acknowledged and verbally consented to continue with current treatment plan and was educated on the importance of compliance with treatment and follow-up appointments.    MEDICATION ISSUES:  CATHY reviewed as expected.  Discussed medication options and treatment plan of prescribed medication as well as the risks, benefits, and side effects including potential falls, possible impaired driving and metabolic adversities among others. Patient is agreeable to call the office with any worsening of symptoms or onset of side effects. Patient is agreeable to call  911 or go to the nearest ER should he/she begin having SI/HI. No medication side effects or related complaints today.     MEDS ORDERED DURING VISIT:  New Medications Ordered This Visit   Medications    escitalopram (LEXAPRO) 10 MG tablet     Sig: Take 0.5 tablets by mouth Daily. 0.5 tab daily for 7 - 14 days, then stop     Dispense:  7 tablet     Refill:  0     Tapering off medication. Thank you for the help. Please call with questions: [phone].    venlafaxine XR (Effexor XR) 37.5 MG 24 hr capsule     Sig: Take 2 capsules by mouth Daily. 1 cap daily x7d, then 2 cap daily     Dispense:  60 capsule     Refill:  2       Return in about 4 weeks (around 5/26/2025) for Video visit.         This document has been electronically signed by José Amaya MD  April 28, 2025 15:16 EDT        Part of this note may be an electronic transcription/translation of spoken language to printed text using the Dragon Dictation System.

## 2025-04-29 ENCOUNTER — TELEPHONE (OUTPATIENT)
Dept: PSYCHIATRY | Facility: CLINIC | Age: 35
End: 2025-04-29
Payer: COMMERCIAL

## 2025-04-29 DIAGNOSIS — F41.0 GENERALIZED ANXIETY DISORDER WITH PANIC ATTACKS: ICD-10-CM

## 2025-04-29 DIAGNOSIS — F41.0 PANIC ATTACKS: ICD-10-CM

## 2025-04-29 DIAGNOSIS — R45.4 IRRITABILITY AND ANGER: ICD-10-CM

## 2025-04-29 DIAGNOSIS — F41.1 GENERALIZED ANXIETY DISORDER WITH PANIC ATTACKS: ICD-10-CM

## 2025-04-29 DIAGNOSIS — F51.05 INSOMNIA DUE TO MENTAL CONDITION: ICD-10-CM

## 2025-04-29 DIAGNOSIS — F45.21 ILLNESS ANXIETY DISORDER: ICD-10-CM

## 2025-04-29 DIAGNOSIS — F33.40 RECURRENT MAJOR DEPRESSIVE DISORDER IN REMISSION: ICD-10-CM

## 2025-04-29 DIAGNOSIS — F42.2 MIXED OBSESSIONAL THOUGHTS AND ACTS: ICD-10-CM

## 2025-04-29 NOTE — TELEPHONE ENCOUNTER
FEMI CHENMARTHA (Key: [id number])  Rx #: [id number]  Need Help? Call us at [phone]  Outcome  Additional Information Required  Your PA has been resolved, no additional PA is required. For further inquiries please contact the number on the back of the member prescription card. (Message [phone])  Drug  Venlafaxine HCl ER 37.5MG er capsules  ePA cloud logo  Form  [facility] Electronic PA Form (2017 N[employer])  Original Claim Info

## 2025-04-30 NOTE — TELEPHONE ENCOUNTER
Pt called and asked about PA.  I informed him that his insurance had said that this should be covered.  No further questions at this time

## 2025-05-01 NOTE — TELEPHONE ENCOUNTER
PT LVM.    PT REPORTS THAT HIS PHARMACY STILL CAN'T FILL THE MEDICATION.     NO FURTHER DETAILS WERE GIVEN.

## 2025-05-02 RX ORDER — VENLAFAXINE HYDROCHLORIDE 37.5 MG/1
37.5 CAPSULE, EXTENDED RELEASE ORAL DAILY
Qty: 7 CAPSULE | Refills: 0 | Status: SHIPPED | OUTPATIENT
Start: 2025-05-02

## 2025-05-02 RX ORDER — VENLAFAXINE HYDROCHLORIDE 75 MG/1
75 CAPSULE, EXTENDED RELEASE ORAL DAILY
Qty: 30 CAPSULE | Refills: 1 | Status: SHIPPED | OUTPATIENT
Start: 2025-05-02

## 2025-05-02 NOTE — TELEPHONE ENCOUNTER
I CALLED AND SPOKE TO THE PHARMACY.    INSURANCE IS STILL DENYING THE QUANTITY OF 2 PER DAY.     INSURANCE WANTS TO COVER A HIGHER STRENGTH AT 1 PER DAY INSTEAD OF 2 PER DAY AT THE LOWER STRENGTH.     THAT IS THE REJECTION THAT THEY ARE GETTING.

## 2025-05-02 NOTE — TELEPHONE ENCOUNTER
Contacted patient and explain prescription changes.  We will send in for 1 week of effexor 37.5 mg and a full month of 75 mg.  Addressed all questions and concerns.

## 2025-05-02 NOTE — TELEPHONE ENCOUNTER
I CALLED AND SPOKE TO THE PT.    HE STATES THAT THE PHARMACY STILL WONT REFILL HIS VENLAFAXINE.    WILL HAVE TO REACH OUT TO THE PHARMACY TO FIND OUT WHAT'S GOING ON.

## 2025-05-12 RX ORDER — CIPROFLOXACIN AND DEXAMETHASONE 3; 1 MG/ML; MG/ML
4 SUSPENSION/ DROPS AURICULAR (OTIC) 2 TIMES DAILY
Qty: 7.5 ML | Refills: 0 | Status: SHIPPED | OUTPATIENT
Start: 2025-05-12 | End: 2025-05-31

## 2025-05-22 NOTE — PROGRESS NOTES
[facility] - Progress Note    Date: 05/30/2025  Time In: 0801  Time Out: 0843    Patient Legal Name: Hamlet Kong  Patient Age: 35 y.o.    CHIEF COMPLAINT: Depression, Anxiety, OCD, Irritability/Anger     Subjective   History of Present Illness     From previous progress note on 4/22/25:  Patient demonstrated understanding of deep breathing techniques during session today as well as body scan mindfulness, and therapist encouraged him to practice these techniques daily to help reduce tension.  He also was given strategies to help him be more flexible with his schedule and in his communication with his wife, and we will discuss this further next session.    Assessment    Mental Status Exam     Appearance: good hygiene and dressed appropriately for the weather  Behavior: calm  Cooperation:  engaged, cooperative, attentive, and friendly  Eye Contact:  good  Affect:  bright and congruent  Mood: expressive and content  Speech: responsive  Thought Process:  linear and organized; goal-oriented  Thought Content: appropriate and abstract  Suicidal: denies  Homicidal:  denies  Hallucinations:  denies  Memory:  intact  Orientation:  person, place, time, and situation  Reliability:  reliable  Insight:  good  Judgment:  good     Clinical Intervention       ICD-10-CM ICD-9-CM   1. Generalized anxiety disorder  F41.1 300.02   2. Recurrent major depressive disorder in remission  F33.40 296.35   3. Mixed obsessional thoughts and acts  F42.2 300.3   4. Attention and concentration deficit  R41.840 799.51        Hamlet (35 y.o.) presents today as a follow-up for continued psychotherapy. Individual psychotherapy was provided utilizing a strengths-based approach & ACT techniques to encourage expression of thoughts and feelings, increase acceptance, manage stress, identify strengths, build confidence, challenge negative thinking patterns, and provide support.  Patient reports that he started a new medication in about a  month ago, and feels that his mood is being managed well.  Denies SI/HI/AVH.  States that his OCD symptoms are under control and he is able to prevent compulsive tendencies.  He states that overall stress and anxiety in his life has decreased, and although he felt anxious a few weeks ago, he was able to use coping strategies to let it pass on its own without it leading to a panic attack or rumination.  He has had no illness anxiety related to potential side effects of any medication, which would have possibly caused an increase in intrusive thoughts in the past.    Plan   Plan & Goals     Therapist worked with patient on accommodations related to attention and concentration deficit, as patient states that he can be absent-minded and forgetful, especially when he feels stressed.  Therapist helped patient identify potential improvements in this area, and encouraged him to practice acceptance.  We discussed scheduling less frequent follow-up due to patient's improvement in symptoms, and he was encouraged to call the office if an earlier appointment is needed.  Will explore further next session.    Patient acknowledged and verbally consented to continue working toward resolving current treatment plan goals and was educated on the importance of participation in the therapeutic process.  Patient will remain compliant with medication regimen as prescribed. Discuss any medication side effects, questions or concerns with prescribing provider.  Call 911 or present to the nearest emergency room in an emergency situation.  National Suicide Prevention Lifeline: Call 988. The Lifeline provides 24/7, free and confidential support for people in distress, prevention and crisis resources.  Crisis Text Line  Text HOME To 272400    Return in about 1 month (around 6/30/2025) for Next scheduled follow up.    ____________________  This document has been electronically signed by Daniella Evans LCSW  May 30, 2025 08:52 EDT    Part of  this note may be an electronic transcription/translation of spoken language to printed text using the Dragon Dictation System.

## 2025-05-27 NOTE — PROGRESS NOTES
"  Outpatient Psychiatry Follow-Up Visit with MD    4/12/2024    Last appointment:9/1/2023    Clinical Status of Patient: Outpatient (Ambulatory)    IDENTIFYING DATA:    Child's Name: Luis Handley  Grade: 9 th 2023-24 [place], [place] - regular education  School:  [employer] ([employer])   Parent: Janet Handley     The patient location is: [place]  The chief complaint leading to consultation is: ADHD inattention and anxiety     Visit type: audiovisual     Face to Face time with patient: 20 minutes     30 minutes of total time spent on the encounter, which includes face to face time and non-face to face time preparing to see the patient (eg, review of tests), Obtaining and/or reviewing separately obtained history, Documenting clinical information in the electronic or other health record, Independently interpreting results (not separately reported) and communicating results to the patient/family/caregiver, or Care coordination (not separately reported).            Each patient to whom he or she provides medical services by telemedicine is:  (1) informed of the relationship between the physician and patient and the respective role of any other health care provider with respect to management of the patient; and (2) notified that he or she may decline to receive medical services by telemedicine and may withdraw from such care at any time.     Notes:       Site:  [facility]     Luis Handley is a 12 y.o. male who was referred by Harlan Mayers for psychiatric evaluation. Luis presents for medication management visit with his mother.    Chief Complaint:  "I am almost a 10 th grader."    Interval History and Content of Current Session:  Interim Events/Subjective Report/Content of Current Session:     Luis was started  on Concerta 18 mg and today they presents for ongoing medication management. Luis is quiet in the office and at home.     Previously it was said that it takes " "Subjective   Hamlet Kong is a 35 y.o. male who presents today for initial evaluation     Referring Provider:  No referring provider defined for this encounter.    Chief Complaint: Anxiety and depression    History of Present Illness:     Chart review:   2025: no visits.  2025: Daniella x3.  2024: int med, unknown, urology, no masses on testicular U/S. Does not have ADHD per neuropsych testing.  2023: Daniella x1  Daniella x2  Seen by sleep medicine.  Home sleep study in February shows mild BRET.  Recommend auto CPAP.    Visits (Below):  \"Hamlet\"    2025:   Mode of Visit: Video  Location of patient: -HOME-  Location of provider: [facility]  You have chosen to receive care through a telehealth visit.  The patient has signed the video visit consent form.  The visit included audio and video interaction. No technical issues occurred during this visit.  Interview:  \"I'm doing good now.\"  But it kicked in the last 5 days and I'm back to normal now  A lot more calm  Less ruminating  No weight gain  Mood/Depression: MDD: stable  Anxiety: CÉSAR: irritable -- improving  Panic attacks: stable  Energy: stable  Concentration: stable  Insomnia: stable  Eating: 266x2, 250, 251 lbs  Refills: none  Substances: def  Therapy: Daniella x3  Medication compliant: y  SE: none  No SI HI AVH.      2025:   Interview:  \"I'm more irritable.\"  Also ruminating  I'm also gaining weight  Constantly doing stuff at home  Mood/Depression: MDD: stable  Anxiety: CÉSAR: irritable  Panic attacks: stable  Energy: stable  Concentration: stable  Insomnia: stable  Eatin, 250, 251 lbs  Refills: none  Substances: def  Therapy: Daniella x3  Medication compliant: y  SE: none  No SI HI AVH.      2024:   Virtual visit via Zoom audio and video due to the COVID-19 pandemic.  Patient is accepting of and agreeable to visit.  The visit consisted of the patient and I. Patient is at home, and I am at the " "Luis hours to complete any sort of writing assignment. "Luis has no ability to maintain focus on something he doesn't find interesting. School comes easy but when it comes down to doing something like English which is his least favorite area and he can take 5 hours to write 4-5 sentences because he can't keep his head in what he is doing. He is in honor's english and math and he has the same  since 6 th grade. He cannot get the sentences down and it could take the entire evening and well into late night hours to finish it."    "Things are good."    Luis is good with his cat.    "I do forget to take my medication sometimes."    "There is a big difference when he doesn't take his medication."    "He has a 4.0 and he is doing great and getting his work done."    Luis is very quiet today and rocks a bit as he sits. His mother speaks for him typically. He will answer a question but in a very direct manner with no spontaneous speech.    He is eating lunch.  No problem falling asleep  No new health issues  No new medical issues      Per [place] he last filled stimulant on 10/24/2023.        Review of Systems   Review of Systems    No tics  No insomnia  No tremor  No       Past Medical, Family and Social History: The patient's past medical, family and social history have been reviewed and updated as appropriate within the electronic medical record - see encounter notes.    Compliance: yes    Side effects: none    Risk Parameters:  Patient reports no suicidal ideation  Patient reports no homicidal ideation  Patient reports no self-injurious behavior  Patient reports no violent behavior        Wt Readings from Last 3 Encounters:   12/18/23 83.5 kg (184 lb 1.4 oz) (98 %, Z= 2.11)*   09/01/23 83.2 kg (183 lb 5 oz) (99 %, Z= 2.19)*   12/09/22 75.8 kg (167 lb 3.5 oz) (98 %, Z= 2.07)*     * Growth percentiles are based on CDC (Boys, 2-20 Years) data.     Temp Readings from Last 3 Encounters:   12/18/23 98.4 °F " "office.  Interview:  \"Doing good.\"  No complaints.  Mood/Depression: MDD: stable  Anxiety: CÉSAR: stable  Panic attacks: stable  Energy: stable  Concentration: stable  Insomnia: stable  Eatin, 251 lbs  Refills: none  Substances: def  Therapy: Daniella, looks like may have stopped  Medication compliant: y  SE: none  No SI HI AVH.      ...      : Chart review: Significant anxiety and depression.  Recently seen by primary care .  Dr. Braun started the patient on Pristiq 25 mg a day along with BuSpar 10 twice daily.  Patient tried and failed Trintellix and hydroxyzine.  Also interested in psychotherapy.  Anxiety since at least .  Failed Lexapro, Zoloft, vilazodone, pristiq.  PDMP is empty.  January labs show elevated glucose 179, low sodium 31, chloride 96 low, otherwise reassuring CMP, thyroid studies, lipids, iron studies, some abnormalities on the CBC, but white count, hemoglobin and hematocrit and platelets are within normal limits.  Holter monitor in 2020 showing periods of sinus tachycardia.  2021: Sinus 406.  No head imaging.  Not a lot in Care Everywhere.  May have been restarted on Lexapro 10 mg a day a few days back.      21: In person.  Interview:  Chart review:   His/Her Story: \"I'm kindof a hypochondriac.\"  P10, G14  Describes obsessions that occur re: medication SE. Compulsions of reading voraciously on them over the internet.  He is always worried that he is sick.  Usually he does not have symptoms.  Sometimes he does though (somatic symptom disorder).  The anxiety gets so bad that he sometimes, rarely, has panic attacks.  He was seen in the ER last year in January for a panic attack.  Possible OCD.  Only stopped lexapro before due to sexual side effects in the past. Has never been on bupropion.  It is working quite well for him now.  \"It has calmed me down and only a week.\"  Some residual depressed mood, insomnia, feelings of guilt, poor energy and concentration " "(36.9 °C) (Oral)   11/05/21 96.8 °F (36 °C) (Temporal)   11/01/21 97.3 °F (36.3 °C) (Temporal)     BP Readings from Last 3 Encounters:   12/18/23 128/75 (90 %, Z = 1.28 /  81 %, Z = 0.88)*   09/01/23 (!) 128/90 (90 %, Z = 1.28 /  >99 %, Z >2.33)*   12/09/22 122/70 (83 %, Z = 0.95 /  74 %, Z = 0.64)*     *BP percentiles are based on the 2017 AAP Clinical Practice Guideline for boys     Pulse Readings from Last 3 Encounters:   12/18/23 73   09/01/23 75   12/09/22 71       Exam (detailed: at least 9 elements; comprehensive: all 15 elements)   Constitutional  Vitals:  Most recent vital signs, dated 12/18/2023 were reviewed.   There were no vitals filed for this visit.     General:  unremarkable, age appropriate     Musculoskeletal  Muscle Strength/Tone:  no tremor, no tic   Gait & Station:  non-ataxic     Psychiatric  Appearance: unremarkable, age appropriate, casually dressed, neatly groomed  Behavior/Cooperation: cooperative, avoids eye contact deliberately  Speech: normal tone, normal rate, normal pitch, normal volume, non-spontaneous, difficult to engage  Mood: steady, euthymic  Affect:  congruent with mood  Thought Process: logical  Thought Content: normal, no suicidality, no homicidality, delusions, or paranoia  Sensorium: person, place, situation, time/date, day of week, month of year, year  Alert and Oriented: x5  Memory: intact to recent and remote events  Attention/concentration: able to attend to interview  Abstract reasoning: literal  Insight: limited  Judgment: limited    No visits with results within 1 Month(s) from this visit.   Latest known visit with results is:   Lab Visit on 01/28/2022   Component Date Value Ref Range Status    SARS Coronavirus 2 Antigen 01/28/2022 Positive (A)  Negative Final     Acceptable 01/28/2022 Yes   Final       Assessment and Diagnosis     General Impression: Luis has a stiff manner of speaking and odd use of language at times. He is said to "talk and talk" " "related to the illness anxiety disorder.  Duration is years.  Some anxiety obviously related to the above: Uncontrolled worrying, restlessness, irritability, feeling on edge, insomnia, poor energy and concentration.  \"All that anxiety leaves me exhausted.\"  Substances: CBD Gummies he bought over-the-counter.  Has been using them for about a week.  Denies all else except social alcohol.  Therapy: Has never tried, interested  Medication compliant: Yes  Psych ROS: Depression, anxiety, illness anxiety disorder.  Negative for psychosis and jay.  No SI HI AVH.    Access to Firearms: Denies    PHQ-9 Depression Screening  PHQ-9 Total Score:      Little interest or pleasure in doing things?     Feeling down, depressed, or hopeless?     Trouble falling or staying asleep, or sleeping too much?     Feeling tired or having little energy?     Poor appetite or overeating?     Feeling bad about yourself - or that you are a failure or have let yourself or your family down?     Trouble concentrating on things, such as reading the newspaper or watching television?     Moving or speaking so slowly that other people could have noticed? Or the opposite - being so fidgety or restless that you have been moving around a lot more than usual?     Thoughts that you would be better off dead, or of hurting yourself in some way?     PHQ-9 Total Score       CÉSAR-7       Past Surgical History:  Past Surgical History:   Procedure Laterality Date    CYST REMOVAL  2006    VASECTOMY      06/2020       Problem List:  Patient Active Problem List   Diagnosis    Gynecomastia    Ear pain, bilateral    Anxiety with depression    Class 2 obesity due to excess calories without serious comorbidity with body mass index (BMI) of 37.0 to 37.9 in adult    Fatigue    Snoring    Scrotal pain    Epididymitis       Allergy:   No Known Allergies     Discontinued Medications:  Medications Discontinued During This Encounter   Medication Reason    venlafaxine XR (Effexor " XR) 37.5 MG 24 hr capsule     venlafaxine XR (Effexor XR) 75 MG 24 hr capsule            Current Medications:   Current Outpatient Medications   Medication Sig Dispense Refill    venlafaxine XR (Effexor XR) 75 MG 24 hr capsule Take 1 capsule by mouth Daily. 30 capsule 1    Cetirizine HCl (ZyrTEC Allergy) 10 MG capsule As Needed.      ciprofloxacin-dexAMETHasone (CIPRODEX) 0.3-0.1 % otic suspension Administer 4 drops into the left ear 2 (Two) Times a Day for 7 days. 7.5 mL 0    ketoconazole (NIZORAL) 2 % cream APPLY TOPICALLY TO FEET TWICE DAILY FOR 6 TO 8 WEEKS      ketoconazole (NIZORAL) 2 % shampoo APPLY SHAMPOO AND LEAVE ON FOR 5 TO 10 MINUTES THEN RINSE OFF      Semaglutide-Weight Management (Wegovy) 0.25 MG/0.5ML solution auto-injector Inject 0.25 mg under the skin into the appropriate area as directed 1 (One) Time Per Week. (Patient not taking: Reported on 2025) 2.5 mL 1    sulfamethoxazole-trimethoprim (Bactrim DS) 800-160 MG per tablet Take 1 tablet by mouth 2 (Two) Times a Day. (Patient not taking: Reported on 2025) 20 tablet 0     No current facility-administered medications for this visit.       Past Medical History:  Past Medical History:   Diagnosis Date    Anxiety     Depression     Panic disorder        Past Psychiatric History:  Began Treatment: From primary care, years  Diagnoses: Depression and anxiety  Psychiatrist:Denies  Therapist: Couples therapy for marriage counseling years ago  Admission History:Denies  Medication Trials: See HPI  Self Harm: Denies  Suicide Attempts:Denies   Psychosis, Anxiety, Depression: Denies    Substance Abuse History:   Types: Social alcohol  Withdrawal Symptoms:Denies  Longest Period Sober:Not Applicable   AA: Not applicable     Social History:  Martial Status:  Employed:Yes  Kids:Yes  House:Lives in a house   History: Denies    Social History     Socioeconomic History    Marital status:    Tobacco Use    Smoking status: Never  with no regard to the listeners interest. He is very bright and generally scores mastery on LEAP but it takes him hours and hours to write 4 sentences. His preferred activity is [employer] and [employer]. He has a best friend named Luis who doesn't attend his school. He views himself as awkward.       ICD-10-CM ICD-9-CM   1. ADHD (attention deficit hyperactivity disorder), inattentive type  F90.0 314.00     R/O high functioning ASD  Intervention/Counseling/Treatment Plan   Could benefit from ASD testing-high functioning ASD  Concerta 18 mg       Return to Clinic: 3 months-in person            "   Smokeless tobacco: Never   Vaping Use    Vaping status: Never Used   Substance and Sexual Activity    Alcohol use: Yes     Alcohol/week: 6.0 standard drinks of alcohol     Types: 6 Cans of beer per week    Drug use: Never    Sexual activity: Yes     Partners: Female     Birth control/protection: Vasectomy       Family History:   Suicide Attempts: Denies  Suicide Completions:Denies      Family History   Problem Relation Age of Onset    Anxiety disorder Mother     Diabetes Father     Anxiety disorder Maternal Grandfather     Stroke Other        Developmental History:       Childhood: Deferred  High School:Completed  College: Some, he is a .  Applied sciences.    Mental Status Exam  Appearance  : groomed, good eye contact, normal street clothes  Behavior  : pleasant and cooperative  Motor  : No abnormal  Speech  :normal rhythm, rate, volume, tone, not hyperverbal, not pressured, normal prosidy  Mood  : \"Good now\"  Affect  : euthymic, mood congruent, good variability  Thought Content  : negative suicidal ideations, negative homicidal ideations, negative obsessions  Perceptions  : negative auditory hallucinations, negative visual hallucinations  Thought Process  : linear  Insight/Judgement  : Fair/fair  Cognition  : grossly intact  Attention   : intact      Review of Systems:  Review of Systems   Constitutional:  Negative for diaphoresis, fatigue and fever.   HENT:  Negative for drooling.    Eyes:  Negative for visual disturbance.   Respiratory:  Negative for cough and shortness of breath.    Cardiovascular:  Negative for chest pain, palpitations and leg swelling.   Gastrointestinal:  Negative for abdominal pain, nausea and vomiting.   Endocrine: Negative for cold intolerance and heat intolerance.   Genitourinary:  Negative for difficulty urinating.   Musculoskeletal:  Negative for back pain, joint swelling and neck pain.   Allergic/Immunologic: Negative for immunocompromised state.   Neurological:  Negative " for dizziness, seizures, syncope, speech difficulty, weakness, light-headedness, numbness and headaches.   Psychiatric/Behavioral:  Negative for confusion.        Physical Exam:  Physical Exam    Vital Signs:   There were no vitals taken for this visit.     Lab Results:   No visits with results within 6 Month(s) from this visit.   Latest known visit with results is:   Orders Only on 09/18/2024   Component Date Value Ref Range Status    Testosterone, Total 09/19/2024 367.00  249.00 - 836.00 ng/dL Final       EKG Results:  No orders to display       Imaging Results:  No Images in the past 120 days found..      Assessment & Plan   Diagnoses and all orders for this visit:    1. Generalized anxiety disorder with panic attacks (Primary)  -     venlafaxine XR (Effexor XR) 75 MG 24 hr capsule; Take 1 capsule by mouth Daily.  Dispense: 30 capsule; Refill: 1    2. Irritability and anger  -     venlafaxine XR (Effexor XR) 75 MG 24 hr capsule; Take 1 capsule by mouth Daily.  Dispense: 30 capsule; Refill: 1    3. Mixed obsessional thoughts and acts  -     venlafaxine XR (Effexor XR) 75 MG 24 hr capsule; Take 1 capsule by mouth Daily.  Dispense: 30 capsule; Refill: 1    4. Recurrent major depressive disorder in remission  -     venlafaxine XR (Effexor XR) 75 MG 24 hr capsule; Take 1 capsule by mouth Daily.  Dispense: 30 capsule; Refill: 1    5. Illness anxiety disorder  -     venlafaxine XR (Effexor XR) 75 MG 24 hr capsule; Take 1 capsule by mouth Daily.  Dispense: 30 capsule; Refill: 1    6. Panic attacks  -     venlafaxine XR (Effexor XR) 75 MG 24 hr capsule; Take 1 capsule by mouth Daily.  Dispense: 30 capsule; Refill: 1    7. Insomnia due to mental condition  -     venlafaxine XR (Effexor XR) 75 MG 24 hr capsule; Take 1 capsule by mouth Daily.  Dispense: 30 capsule; Refill: 1    8. Attention and concentration deficit        Visit Diagnoses:    ICD-10-CM ICD-9-CM   1. Generalized anxiety disorder with panic attacks  F41.1  300.02    F41.0 300.01   2. Irritability and anger  R45.4 799.22   3. Mixed obsessional thoughts and acts  F42.2 300.3   4. Recurrent major depressive disorder in remission  F33.40 296.35   5. Illness anxiety disorder  F45.21 300.7   6. Panic attacks  F41.0 300.01   7. Insomnia due to mental condition  F51.05 300.9     327.02   8. Attention and concentration deficit  R41.840 799.51     05/28/2025: Improving, no changes, but close follow up. 6w    04/28/2025: CÉSAR/irritable. Can't determine any trigger. Change escitalopram to effexor.    09/11/2024: stable, well, no changes, 1 yr.    11/3: Stable, well, no changes. Follow up on testing.    8/29: Dep and anx under control, son with ADHD, and ADHD s/sx. [facility] to [facility].    5/15: Doing quite well. No changes.     2/13: Doing well, no changes.     12/13: Weight gain, possibly attributable to Wellbutrin, although it is somewhat difficult to say.  Given the timing, it is unlikely Lexapro.  Discontinue Wellbutrin and start BuSpar and monitor weight.  Patient will also keep a food diary.  Other causes should be considered as well.     8/25: Start bupropion to target anx and sexual dysfxn. Also concentration issues.     7/8: Increase lexapro.     5/26: Illness anxiety disorder, possible somatic symptom disorder.  Rule out OCD.  Residual depression and anxiety as well.  Continue Lexapro and start Ativan as needed.  Referral to therapy.  Consider Luvox or clomipramine for OCD.  Also high dose fluoxetine.     PLAN:  Safety: No acute safety concerns  Therapy: Referral Made  Risk Assessment: Risk of self-harm acutely is moderate.  Risk factors include anxiety disorder, mood disorder, and recent psychosocial stressors (pandemic). Protective factors include no family history, no access to weapons, no present SI, no history of suicide attempts or self-harm in the past, minimal AODA, healthcare seeking, future orientation, willingness to engage in care.  Risk of  self-harm chronically is also moderate, but could be further elevated in the event of treatment noncompliance and/or AODA.  Meds:  REDUCE Lexapro 10 (not 20, which made him feel numb) to 5 mg x7d, then STOP.Risks, benefits, alternatives discussed with patient including GI upset, nausea vomiting diarrhea, theoretical decrease of seizure threshold predisposing the patient to a slightly higher seizure risk, headaches, sexual dysfunction, serotonin syndrome, bleeding risk, increased suicidality in patients 24 years and younger.  After discussion of these risks and benefits, the patient voiced understanding and agreed to proceed.  START effexor 37.5 mg qday x7d, then 75 mg qday. Risks, benefits, alternatives discussed with patient including GI upset, nausea vomiting diarrhea, theoretical decrease of seizure threshold predisposing the patient to a slightly higher seizure risk, headaches, sexual dysfunction, serotonin syndrome, bleeding risk, increased suicidality in patients 24 years and younger, switching to jay/hypomania.  Also constipation and urinary retention.  After discussion of these risks and benefits, the patient voiced understanding and agreed to proceed.  S/P:  bupropion  mg daily. WG? 2/23  buspar 10 mg po bid. Noncompliant 8/23  Ativan 0.5 mg p.o. daily as needed anxiety. Not needed. 10/23  Labs: UDS never got    Patient screened positive for depression based on a PHQ-9 score of  on . Follow-up recommendations include: Prescribed antidepressant medication treatment.           TREATMENT PLAN/GOALS: Continue supportive psychotherapy efforts and medications as indicated. Treatment and medication options discussed during today's visit. Patient acknowledged and verbally consented to continue with current treatment plan and was educated on the importance of compliance with treatment and follow-up appointments.    MEDICATION ISSUES:  CATHY reviewed as expected.  Discussed medication options and treatment  plan of prescribed medication as well as the risks, benefits, and side effects including potential falls, possible impaired driving and metabolic adversities among others. Patient is agreeable to call the office with any worsening of symptoms or onset of side effects. Patient is agreeable to call 911 or go to the nearest ER should he/she begin having SI/HI. No medication side effects or related complaints today.     MEDS ORDERED DURING VISIT:  New Medications Ordered This Visit   Medications    venlafaxine XR (Effexor XR) 75 MG 24 hr capsule     Sig: Take 1 capsule by mouth Daily.     Dispense:  30 capsule     Refill:  1     More refills. Thank you for the help. Please call with questions: [phone].       Return in about 6 weeks (around 7/9/2025) for Video visit.         This document has been electronically signed by José Amaya MD  May 28, 2025 07:33 EDT        Part of this note may be an electronic transcription/translation of spoken language to printed text using the Dragon Dictation System.

## 2025-05-28 ENCOUNTER — TELEMEDICINE (OUTPATIENT)
Dept: PSYCHIATRY | Facility: CLINIC | Age: 35
End: 2025-05-28
Payer: COMMERCIAL

## 2025-05-28 DIAGNOSIS — F41.0 PANIC ATTACKS: ICD-10-CM

## 2025-05-28 DIAGNOSIS — R41.840 ATTENTION AND CONCENTRATION DEFICIT: ICD-10-CM

## 2025-05-28 DIAGNOSIS — F51.05 INSOMNIA DUE TO MENTAL CONDITION: ICD-10-CM

## 2025-05-28 DIAGNOSIS — F41.1 GENERALIZED ANXIETY DISORDER WITH PANIC ATTACKS: Primary | ICD-10-CM

## 2025-05-28 DIAGNOSIS — F45.21 ILLNESS ANXIETY DISORDER: ICD-10-CM

## 2025-05-28 DIAGNOSIS — F42.2 MIXED OBSESSIONAL THOUGHTS AND ACTS: ICD-10-CM

## 2025-05-28 DIAGNOSIS — F41.0 GENERALIZED ANXIETY DISORDER WITH PANIC ATTACKS: Primary | ICD-10-CM

## 2025-05-28 DIAGNOSIS — F33.40 RECURRENT MAJOR DEPRESSIVE DISORDER IN REMISSION: ICD-10-CM

## 2025-05-28 DIAGNOSIS — R45.4 IRRITABILITY AND ANGER: ICD-10-CM

## 2025-05-28 RX ORDER — VENLAFAXINE HYDROCHLORIDE 75 MG/1
75 CAPSULE, EXTENDED RELEASE ORAL DAILY
Qty: 30 CAPSULE | Refills: 1 | Status: SHIPPED | OUTPATIENT
Start: 2025-05-28

## 2025-05-30 ENCOUNTER — OFFICE VISIT (OUTPATIENT)
Dept: PSYCHIATRY | Facility: CLINIC | Age: 35
End: 2025-05-30
Payer: COMMERCIAL

## 2025-05-30 DIAGNOSIS — F33.40 RECURRENT MAJOR DEPRESSIVE DISORDER IN REMISSION: ICD-10-CM

## 2025-05-30 DIAGNOSIS — F41.1 GENERALIZED ANXIETY DISORDER: Primary | ICD-10-CM

## 2025-05-30 DIAGNOSIS — R41.840 ATTENTION AND CONCENTRATION DEFICIT: ICD-10-CM

## 2025-05-30 DIAGNOSIS — F42.2 MIXED OBSESSIONAL THOUGHTS AND ACTS: ICD-10-CM

## 2025-05-30 NOTE — PSYCHOTHERAPY NOTE
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 Am I going to hurt my kids? Am I going to hurt myself?  COMPULSION - mental ritual, reassurance \"I would never do that\"     DISEASE PARK  TRIGGER - movies about any disease (or depression) - I lock on to the symptoms of the characters - if they have a symptom, I have to make sure I don't have that symptom  OBSESSION - Do I have depression?  COMPULSION - mental ritual, reassurance     MORALITY PARK  TRIGGER - argument or disagreement with wife (turns into anxiety), I don't argue - I become very passive, I won't talk a lot  OBSESSION - Am I doing the right thing? I'll never do anything right  COMPULSION - sit by myself and be quiet, relax, calm everything, recap the conversation with her  FEARED CONSEQUENCE - wife leaving, taking kids, splitting custody - alone (more distress, trying to avoid it)     I don't like confrontation at all - I don't like to bring up anything confrontational  I don't like making people - fear abandonment (worse after wife cheated in 2020)  ________________     12 - son - Jamel   9 - daughter   5 - daughter  _________________    I switched meds - it's working really well  I saw " him about a month ago  I'm taking Effexor and it's working     I wasn't having anxiety about anything   I don't like to not have a structure     We leave in 2 weeks from today and we don't check into our room until Saturday     I play video games for relaxation  I try to be flexible with everything

## 2025-08-05 ENCOUNTER — TELEMEDICINE (OUTPATIENT)
Dept: PSYCHIATRY | Facility: CLINIC | Age: 35
End: 2025-08-05
Payer: COMMERCIAL

## 2025-08-05 DIAGNOSIS — F41.0 PANIC ATTACKS: ICD-10-CM

## 2025-08-05 DIAGNOSIS — F41.1 GENERALIZED ANXIETY DISORDER WITH PANIC ATTACKS: ICD-10-CM

## 2025-08-05 DIAGNOSIS — F45.21 ILLNESS ANXIETY DISORDER: ICD-10-CM

## 2025-08-05 DIAGNOSIS — F33.40 RECURRENT MAJOR DEPRESSIVE DISORDER IN REMISSION: ICD-10-CM

## 2025-08-05 DIAGNOSIS — F41.0 GENERALIZED ANXIETY DISORDER WITH PANIC ATTACKS: ICD-10-CM

## 2025-08-05 DIAGNOSIS — F51.05 INSOMNIA DUE TO MENTAL CONDITION: ICD-10-CM

## 2025-08-05 DIAGNOSIS — R41.840 ATTENTION AND CONCENTRATION DEFICIT: ICD-10-CM

## 2025-08-05 DIAGNOSIS — F41.1 GENERALIZED ANXIETY DISORDER: Primary | ICD-10-CM

## 2025-08-05 DIAGNOSIS — F42.2 MIXED OBSESSIONAL THOUGHTS AND ACTS: ICD-10-CM

## 2025-08-05 DIAGNOSIS — R45.4 IRRITABILITY AND ANGER: ICD-10-CM

## 2025-08-05 PROCEDURE — 99214 OFFICE O/P EST MOD 30 MIN: CPT | Performed by: STUDENT IN AN ORGANIZED HEALTH CARE EDUCATION/TRAINING PROGRAM

## 2025-08-05 RX ORDER — VENLAFAXINE HYDROCHLORIDE 37.5 MG/1
37.5 CAPSULE, EXTENDED RELEASE ORAL DAILY
Qty: 7 CAPSULE | Refills: 0 | Status: SHIPPED | OUTPATIENT
Start: 2025-08-05

## 2025-08-05 RX ORDER — ESCITALOPRAM OXALATE 10 MG/1
10 TABLET ORAL DAILY
Qty: 90 TABLET | Refills: 1 | Status: SHIPPED | OUTPATIENT
Start: 2025-08-05

## 2025-08-05 RX ORDER — ESCITALOPRAM OXALATE 10 MG/1
10 TABLET ORAL DAILY
COMMUNITY
End: 2025-08-05 | Stop reason: SDUPTHER

## 2025-08-11 ENCOUNTER — OFFICE VISIT (OUTPATIENT)
Dept: PSYCHIATRY | Facility: CLINIC | Age: 35
End: 2025-08-11
Payer: COMMERCIAL

## 2025-08-11 DIAGNOSIS — F41.1 GENERALIZED ANXIETY DISORDER: Primary | ICD-10-CM

## 2025-08-11 DIAGNOSIS — F42.2 MIXED OBSESSIONAL THOUGHTS AND ACTS: ICD-10-CM

## 2025-08-11 DIAGNOSIS — R41.840 ATTENTION AND CONCENTRATION DEFICIT: ICD-10-CM

## 2025-08-11 DIAGNOSIS — F33.0 MILD EPISODE OF RECURRENT MAJOR DEPRESSIVE DISORDER: ICD-10-CM

## 2025-08-11 PROCEDURE — 90834 PSYTX W PT 45 MINUTES: CPT | Performed by: SOCIAL WORKER
